# Patient Record
Sex: FEMALE | Race: BLACK OR AFRICAN AMERICAN | NOT HISPANIC OR LATINO | Employment: FULL TIME | URBAN - METROPOLITAN AREA
[De-identification: names, ages, dates, MRNs, and addresses within clinical notes are randomized per-mention and may not be internally consistent; named-entity substitution may affect disease eponyms.]

---

## 2017-02-10 ENCOUNTER — HOSPITAL ENCOUNTER (EMERGENCY)
Facility: HOSPITAL | Age: 22
Discharge: HOME | End: 2017-02-10

## 2017-02-10 VITALS
HEART RATE: 68 BPM | BODY MASS INDEX: 19.62 KG/M2 | TEMPERATURE: 98 F | SYSTOLIC BLOOD PRESSURE: 147 MMHG | WEIGHT: 125 LBS | RESPIRATION RATE: 18 BRPM | OXYGEN SATURATION: 97 % | DIASTOLIC BLOOD PRESSURE: 89 MMHG | HEIGHT: 67 IN

## 2017-02-10 DIAGNOSIS — N76.0 BACTERIAL VAGINOSIS: Primary | ICD-10-CM

## 2017-02-10 DIAGNOSIS — B96.89 BACTERIAL VAGINOSIS: Primary | ICD-10-CM

## 2017-02-10 DIAGNOSIS — A59.9 TRICHIMONIASIS: ICD-10-CM

## 2017-02-10 LAB
B-HCG UR QL: NEGATIVE
BACTERIA GENITAL QL WET PREP: ABNORMAL
BILIRUB UR QL STRIP: NEGATIVE
CLARITY UR: CLEAR
CLUE CELLS VAG QL WET PREP: ABNORMAL
COLOR UR: YELLOW
FILAMENT FUNGI VAG WET PREP-#/AREA: ABNORMAL
GLUCOSE UR QL STRIP: NEGATIVE
HGB UR QL STRIP: NEGATIVE
KETONES UR QL STRIP: NEGATIVE
LEUKOCYTE ESTERASE UR QL STRIP: ABNORMAL
MICROSCOPIC COMMENT: ABNORMAL
NITRITE UR QL STRIP: NEGATIVE
PH UR STRIP: 7 [PH] (ref 5–8)
PROT UR QL STRIP: NEGATIVE
RBC #/AREA URNS HPF: 4 /HPF (ref 0–4)
SP GR UR STRIP: 1.01 (ref 1–1.03)
SPECIMEN SOURCE: ABNORMAL
SQUAMOUS #/AREA URNS HPF: 12 /HPF
T VAGINALIS GENITAL QL WET PREP: ABNORMAL
TRICHOMONAS UR QL MICRO: ABNORMAL
URN SPEC COLLECT METH UR: ABNORMAL
UROBILINOGEN UR STRIP-ACNC: NEGATIVE EU/DL
WBC #/AREA URNS HPF: 9 /HPF (ref 0–5)
WBC #/AREA VAG WET PREP: ABNORMAL
YEAST GENITAL QL WET PREP: ABNORMAL

## 2017-02-10 PROCEDURE — 87591 N.GONORRHOEAE DNA AMP PROB: CPT

## 2017-02-10 PROCEDURE — 87210 SMEAR WET MOUNT SALINE/INK: CPT

## 2017-02-10 PROCEDURE — 99284 EMERGENCY DEPT VISIT MOD MDM: CPT

## 2017-02-10 PROCEDURE — 81025 URINE PREGNANCY TEST: CPT

## 2017-02-10 PROCEDURE — 81000 URINALYSIS NONAUTO W/SCOPE: CPT

## 2017-02-10 RX ORDER — METRONIDAZOLE 500 MG/1
500 TABLET ORAL EVERY 12 HOURS
Qty: 14 TABLET | Refills: 0 | Status: SHIPPED | OUTPATIENT
Start: 2017-02-10 | End: 2017-02-17

## 2017-02-10 NOTE — ED PROVIDER NOTES
Encounter Date: 2/10/2017       History     Chief Complaint   Patient presents with    Vaginal Discharge     with back pain, denies dysuria. Reports frequency     Review of patient's allergies indicates:  No Known Allergies  Patient is a 21 y.o. female presenting with the following complaint: female genitourinary complaint. The history is provided by the patient.   Female  Problem   Primary symptoms include discharge, pelvic pain.    Primary symptoms include no fever, no genital rash, no genital odor, no dysuria, and no vaginal bleeding.   This is a new problem. The current episode started several days ago. The problem occurs rarely. The problem has been unchanged. The symptoms occur spontaneously. She is not pregnant. The discharge was white. Associated symptoms include frequency. Pertinent negatives include no anorexia, no diaphoresis, no abdominal swelling, no abdominal pain, no constipation, no diarrhea, no nausea, no vomiting, no light-headedness and no dizziness. She has tried nothing for the symptoms. Sexual activity: sexually active. There is a concern regarding sexually transmitted diseases.     No past medical history on file.  Past Medical History Pertinent Negatives   Diagnosis Date Noted    Asthma 1/9/2015    GERD (gastroesophageal reflux disease) 1/9/2015    Hypertension 1/9/2015     No past surgical history on file.  Family History   Problem Relation Age of Onset    Birth defects Neg Hx      Social History   Substance Use Topics    Smoking status: Never Smoker    Smokeless tobacco: Not on file    Alcohol use No     Review of Systems   Constitutional: Negative for diaphoresis and fever.   HENT: Negative for sore throat.    Eyes: Negative for photophobia and redness.   Respiratory: Negative for shortness of breath.    Cardiovascular: Negative for chest pain.   Gastrointestinal: Negative for abdominal pain, anorexia, constipation, diarrhea, nausea and vomiting.   Endocrine: Negative for  polydipsia and polyphagia.   Genitourinary: Positive for frequency, pelvic pain and vaginal discharge. Negative for decreased urine volume, dysuria, urgency, vaginal bleeding and vaginal pain.   Musculoskeletal: Negative for back pain.   Skin: Negative for rash.   Neurological: Negative for dizziness, weakness and light-headedness.   Hematological: Does not bruise/bleed easily.   Psychiatric/Behavioral: The patient is not nervous/anxious.    All other systems reviewed and are negative.      Physical Exam   Initial Vitals   BP Pulse Resp Temp SpO2   02/10/17 1133 02/10/17 1133 02/10/17 1133 02/10/17 1133 02/10/17 1133   147/89 68 18 98.1 °F (36.7 °C) 97 %     Physical Exam    Nursing note and vitals reviewed.  Constitutional: Vital signs are normal. She appears well-developed and well-nourished. No distress.   HENT:   Head: Normocephalic and atraumatic.   Right Ear: External ear normal.   Left Ear: External ear normal.   Nose: Nose normal.   Mouth/Throat: Oropharynx is clear and moist.   Eyes: Conjunctivae, EOM and lids are normal. Pupils are equal, round, and reactive to light.   Neck: Normal range of motion and full passive range of motion without pain. Neck supple.   Cardiovascular: Normal rate, regular rhythm, S1 normal, S2 normal, normal heart sounds, intact distal pulses and normal pulses.   Pulmonary/Chest: Breath sounds normal. No respiratory distress. She has no wheezes. She has no rales.   Abdominal: Soft. Normal appearance and bowel sounds are normal. She exhibits no distension. There is no tenderness.   Genitourinary: Cervix exhibits discharge. Cervix exhibits no motion tenderness and no friability. Vaginal discharge (scant amount of thick, white, non-malodorous discahrge in vaginal vault) found.   Musculoskeletal: Normal range of motion.   Lymphadenopathy:     She has no cervical adenopathy.   Neurological: She is alert and oriented to person, place, and time. She has normal strength. No cranial nerve  deficit or sensory deficit. Coordination and gait normal.   Skin: Skin is warm, dry and intact.   Psychiatric: She has a normal mood and affect. Her speech is normal and behavior is normal. Judgment and thought content normal. Cognition and memory are normal.         ED Course   Procedures  Labs Reviewed   URINALYSIS - Abnormal; Notable for the following:        Result Value    Leukocytes, UA Trace (*)     All other components within normal limits   URINALYSIS MICROSCOPIC - Abnormal; Notable for the following:     WBC, UA 9 (*)     Trichomonas, UA Rare (*)     All other components within normal limits   VAGINAL SCREEN - Abnormal; Notable for the following:     Trichomonas Few (*)     Clue Cells, Wet Prep Few (*)     WBC - Vaginal Screen Few (*)     Bacteria - Vaginal Screen Many (*)     All other components within normal limits   C. TRACHOMATIS/N. GONORRHOEAE BY AMP DNA   PREGNANCY TEST, URINE RAPID                               ED Course     Clinical Impression:   The primary encounter diagnosis was Bacterial vaginosis. A diagnosis of Trichimoniasis was also pertinent to this visit.    Disposition:   Disposition: Discharged  Condition: Stable       KELSEY Olivas  02/10/17 1429       KELSEY Olivas  02/10/17 1430       KELSEY Olivas  02/10/17 1431

## 2017-02-10 NOTE — ED AVS SNAPSHOT
[facility]  [address] [facility] [address]               Miriam Jett   2/10/2017 11:57 AM   ED    Description:  Female : 1995   Department:  [facility]            Your Care was Coordinated By:     Provider Role From To    KELSEY Olivas Physician Assistant 02/10/17 0415 --      Reason for Visit     Vaginal Discharge           ED Disposition     ED Disposition Condition Comment    Discharge             To Do List            These Medications        Disp Refills Start End    metronidazole (FLAGYL) 500 MG tablet 14 tablet 0 2/10/2017 2017    Take 1 tablet (500 mg total) by mouth every 12 (twelve) hours. - Oral      [facility] On Call     [facility] Nurse Care Line -  Assistance  Registered nurses in the [facility] provide clinical advisement, health education, appointment booking, and other advisory services.  Call for this free service at [phone].             Medications           Message regarding Medications     Verify the changes and/or additions to your medication regime listed below are the same as discussed with your clinician today.  If any of these changes or additions are incorrect, please notify your healthcare provider.        START taking these NEW medications        Refills    metronidazole (FLAGYL) 500 MG tablet 0    Sig: Take 1 tablet (500 mg total) by mouth every 12 (twelve) hours.    Class: Print    Route: Oral           Verify that the below list of medications is an accurate representation of the medications you are currently taking.  If none reported, the list may be blank. If incorrect, please contact your healthcare provider. Carry this list with you in case of emergency.           Current Medications     metronidazole (FLAGYL) 500 MG tablet Take 1 tablet (500 mg total) by mouth every 12 (twelve) hours.           Clinical Reference Information           Your  "Vitals Were     BP Pulse Temp Resp Height Weight    147/89 (BP Location: Right arm, Patient Position: Sitting) 68 98.1 °F (36.7 °C) (Oral) 18 5' 7" (1.702 m) 56.7 kg (125 lb)    SpO2 BMI             97% 19.58 kg/m2         Allergies as of 2/10/2017     No Known Allergies      Immunizations Administered on Date of Encounter - 2/10/2017     None      ED Micro, Lab, POCT     Start Ordered       Status Ordering Provider    02/10/17 1231 02/10/17 1231  Vaginal Screen Vagina  STAT      Final result     02/10/17 1231 02/10/17 1231  C. trachomatis/N. gonorrhoeae by AMP DNA Vagina  STAT      In process     02/10/17 1135 02/10/17 1135  Urinalysis Clean Catch  STAT      Final result     02/10/17 1135 02/10/17 1135  Pregnancy, urine rapid (UPT)  Once      Final result     02/10/17 1135 02/10/17 1135  Urinalysis Microscopic  Once      Final result       ED Imaging Orders     None        Discharge Instructions         Trichomonas Vaginalis (Discharge)  Does this test have other names?  Trichomonas culture, testing for "trich" (pronounced "trick"), trichomoniasis, TV  What is this test?  This test looks for the Trichomonas vaginalis (T. vaginalis) parasite. This parasite causes a sexually transmitted disease (STD) called trichomoniasis. This is a common type of STD. The parasite is more likely to infect women than men.  Experts have traditionally thought it causes few complications. But during pregnancy, it can raise a woman's risk of having her baby prematurely. Infected mothers are also more likely to have a low birth weight baby. Trichomoniasis can also raise people's risk of becoming infected with or transmitting another STD, such as HIV. In men, this parasite can cause inflammation of the urethra.   Why do I need this test?  You might have this test to find out whether you have T. vaginalis. Many people who are infected have no symptoms. Only about 30% of people have symptoms.  In women, the infection can cause:  · Vaginal " discharge  · Painful urination  · Unusual vaginal odor  · Vaginal redness  · Discomfort during intercourse  · Severe vaginal itching  In men, infection may cause:  · Discharge from the penis  · Itching in the penis  · Discomfort with urination or ejaculation  What other tests might I have along with this test?  In women, the healthcare provider might check the acidity (pH) of vaginal discharge.   What do my test results mean?  Many things may affect your lab test results. These include the method each lab uses to do the test. Even if your test results are different from the normal value, you may not have a problem. To learn what the results mean for you, talk with your healthcare provider.  A normal test result means no Trichomonas parasites have been found, and the pH of the vagina will be 4.5 or less. Visible parasites under the microscope or parasites that grow in a culture dish mean you have a trichomoniasis infection. Also during trichomoniasis, the pH of vaginal discharge may be greater than 5.   How is this test done?  In women, this test requires a sample of vaginal discharge. To collect the sample, your healthcare provider may place a speculum in your vagina to look at the vagina and cervix.  In men, the healthcare provider may need to swab the inside of the urethra and collect a urine sample.  What might affect my test results?  Nothing should affect your test results.  How do I get ready for this test?  You don't need to prepare for this test.       Date Last Reviewed: 10/12/2015  © 1832-9061 [employer]. [address] [place] [address]. All rights reserved. This information is not intended as a substitute for professional medical care. Always follow your healthcare professional's instructions.          [facility] Sign-Up     Activating your [facility] account is as easy as 1-2-3!     1) Visit [email or web address], select Sign Up Now, enter this activation code and your date of birth, then  select Next.  [email or web address]  Expires: 3/27/2017  2:28 PM      2) Create a username and password to use when you visit [facility] in the future and select a security question in case you lose your password and select Next.    3) Enter your e-mail address and click Sign Up!    Additional Information  If you have questions, please e-mail [email or web address] or call [phone] to talk to our [employer] staff. Remember, [facility] is NOT to be used for urgent needs. For medical emergencies, dial 911.          [facility] complies with applicable Federal civil rights laws and does not discriminate on the basis of race, color, national origin, age, disability, or sex.        Language Assistance Services     ATTENTION: Language assistance services are available, free of charge. Please call [phone].      ATENCIÓN: Si habla español, tiene a souza disposición servicios gratuitos de asistencia lingüística. Llame al [phone].     TIANNA Ý: N?u b?n nói Ti?ng Vi?t, có các d?ch v? h? tr? ngôn ng? mi?n phí dành cho b?n. G?i s? [phone].

## 2017-02-14 LAB
C TRACH DNA SPEC QL NAA+PROBE: NEGATIVE
N GONORRHOEA DNA SPEC QL NAA+PROBE: NEGATIVE

## 2017-10-17 ENCOUNTER — HOSPITAL ENCOUNTER (EMERGENCY)
Facility: HOSPITAL | Age: 22
Discharge: HOME | End: 2017-10-17
Payer: MEDICAID

## 2017-10-17 VITALS
BODY MASS INDEX: 19.62 KG/M2 | TEMPERATURE: 99 F | SYSTOLIC BLOOD PRESSURE: 131 MMHG | DIASTOLIC BLOOD PRESSURE: 72 MMHG | HEART RATE: 71 BPM | HEIGHT: 67 IN | OXYGEN SATURATION: 100 % | WEIGHT: 125 LBS | RESPIRATION RATE: 18 BRPM

## 2017-10-17 DIAGNOSIS — A59.01 TRICHOMONAL VAGINITIS: Primary | ICD-10-CM

## 2017-10-17 DIAGNOSIS — B96.89 BACTERIAL VAGINOSIS: ICD-10-CM

## 2017-10-17 DIAGNOSIS — N76.0 BACTERIAL VAGINOSIS: ICD-10-CM

## 2017-10-17 LAB
B-HCG UR QL: NEGATIVE
BACTERIA #/AREA URNS HPF: NORMAL /HPF
BACTERIA GENITAL QL WET PREP: ABNORMAL
BILIRUB UR QL STRIP: NEGATIVE
CLARITY UR: CLEAR
CLUE CELLS VAG QL WET PREP: ABNORMAL
COLOR UR: YELLOW
FILAMENT FUNGI VAG WET PREP-#/AREA: ABNORMAL
GLUCOSE UR QL STRIP: NEGATIVE
HGB UR QL STRIP: NEGATIVE
KETONES UR QL STRIP: ABNORMAL
LEUKOCYTE ESTERASE UR QL STRIP: ABNORMAL
MICROSCOPIC COMMENT: NORMAL
NITRITE UR QL STRIP: NEGATIVE
PH UR STRIP: 6 [PH] (ref 5–8)
PROT UR QL STRIP: NEGATIVE
RBC #/AREA URNS HPF: 1 /HPF (ref 0–4)
SP GR UR STRIP: 1.01 (ref 1–1.03)
SPECIMEN SOURCE: ABNORMAL
SQUAMOUS #/AREA URNS HPF: 5 /HPF
T VAGINALIS GENITAL QL WET PREP: ABNORMAL
URN SPEC COLLECT METH UR: ABNORMAL
UROBILINOGEN UR STRIP-ACNC: NEGATIVE EU/DL
WBC #/AREA URNS HPF: 4 /HPF (ref 0–5)
WBC #/AREA VAG WET PREP: ABNORMAL
YEAST GENITAL QL WET PREP: ABNORMAL

## 2017-10-17 PROCEDURE — 25000003 PHARM REV CODE 250: Performed by: PHYSICIAN ASSISTANT

## 2017-10-17 PROCEDURE — 87210 SMEAR WET MOUNT SALINE/INK: CPT

## 2017-10-17 PROCEDURE — 99284 EMERGENCY DEPT VISIT MOD MDM: CPT | Mod: 25

## 2017-10-17 PROCEDURE — 63600175 PHARM REV CODE 636 W HCPCS: Performed by: PHYSICIAN ASSISTANT

## 2017-10-17 PROCEDURE — 81025 URINE PREGNANCY TEST: CPT

## 2017-10-17 PROCEDURE — 87591 N.GONORRHOEAE DNA AMP PROB: CPT

## 2017-10-17 PROCEDURE — 96372 THER/PROPH/DIAG INJ SC/IM: CPT

## 2017-10-17 PROCEDURE — 81000 URINALYSIS NONAUTO W/SCOPE: CPT

## 2017-10-17 RX ORDER — CEFTRIAXONE 250 MG/1
250 INJECTION, POWDER, FOR SOLUTION INTRAMUSCULAR; INTRAVENOUS
Status: COMPLETED | OUTPATIENT
Start: 2017-10-17 | End: 2017-10-17

## 2017-10-17 RX ORDER — METRONIDAZOLE 500 MG/1
500 TABLET ORAL EVERY 12 HOURS
Qty: 20 TABLET | Refills: 0 | Status: SHIPPED | OUTPATIENT
Start: 2017-10-17 | End: 2017-10-27

## 2017-10-17 RX ORDER — AZITHROMYCIN 250 MG/1
1000 TABLET, FILM COATED ORAL
Status: COMPLETED | OUTPATIENT
Start: 2017-10-17 | End: 2017-10-17

## 2017-10-17 RX ADMIN — CEFTRIAXONE SODIUM 250 MG: 250 INJECTION, POWDER, FOR SOLUTION INTRAMUSCULAR; INTRAVENOUS at 05:10

## 2017-10-17 RX ADMIN — AZITHROMYCIN 1000 MG: 250 TABLET, FILM COATED ORAL at 05:10

## 2017-10-17 NOTE — ED PROVIDER NOTES
SCRIBE #1 NOTE: I, Khushi Chavarria, am scribing for, and in the presence of, KELSEY Yoon. I have scribed the entire note.      History      Chief Complaint   Patient presents with    Vaginal Discharge     white discharge and urinary frequency       Review of patient's allergies indicates:  No Known Allergies     HPI   HPI    10/17/2017, 3:11 PM   History obtained from the patient      History of Present Illness: Miriam Jett is a 22 y.o. female patient with PMHx of trichomonas who presents to the Emergency Department for vaginal discharge which onset gradually 2 weeks ago. Symptoms are constant and moderate in severity. No mitigating or exacerbating factors reported. Associated sxs include pelvic pain and urinary frequency. Patient denies any fever, n/v/d, abd pain, vaginal pain, vaginal bleeding, dysuria, hematuria, flank pain, urgency, difficulty urinating, and all other sxs at this time. No further complaints or concerns at this time.       Arrival mode: Personal vehicle    PCP: Primary Doctor No       Past Medical History:  History reviewed. No pertinent past medical history.    Past Surgical History:  History reviewed. No pertinent surgical history.      Family History:  Family History   Problem Relation Age of Onset    Birth defects Neg Hx        Social History:  Social History     Social History Main Topics    Smoking status: Never Smoker    Smokeless tobacco: Never Used    Alcohol use No    Drug use: No    Sexual activity: Yes     Partners: Male       ROS   Review of Systems   Constitutional: Negative for fever.   HENT: Negative for sore throat.    Respiratory: Negative for shortness of breath.    Cardiovascular: Negative for chest pain.   Gastrointestinal: Negative for abdominal pain, blood in stool, constipation, diarrhea, nausea and vomiting.   Genitourinary: Positive for frequency, pelvic pain and vaginal discharge. Negative for decreased urine volume, difficulty urinating,  "dysuria, enuresis, flank pain, genital sores, hematuria, menstrual problem, urgency, vaginal bleeding and vaginal pain.   Musculoskeletal: Negative for back pain.   Skin: Negative for rash.   Neurological: Negative for dizziness, weakness, light-headedness, numbness and headaches.   Hematological: Does not bruise/bleed easily.       Physical Exam      Initial Vitals [10/17/17 1440]   BP Pulse Resp Temp SpO2   131/72 71 18 98.5 °F (36.9 °C) 100 %      MAP       91.67          Physical Exam  Nursing Notes and Vital Signs Reviewed.  Constitutional: Patient is in no acute distress. Well-developed and well-nourished.  Head: Normocephalic.  Eyes: PERRL.   ENT: Mucous membranes are moist. Oropharynx is clear and symmetric.    Cardiovascular: Regular rate. Regular rhythm.   Pulmonary/Chest: No respiratory distress. Clear to auscultation bilaterally. No wheezing, rales, or rhonchi.  Abdominal: Soft and non-distended. There is no tenderness. No rebound, guarding, or rigidity.   Genitourinary: No CVA tenderness  Pelvic: A female chaperone was present for this examination. Nl external inspection. No lesions or abnormalities were visible on the labia majora or minora. Cervical os is closed. Cervix is erythematous, not friable or dilated. There is no CMT. There is no blood in the vaginal vault. Minimal thin, light colored discharge. No adnexal tenderness. No adnexal masses.   Musculoskeletal: Moves all extremities. No septic knee.   Skin: Warm and dry. No rash to palms or soles.   Neurological:  Alert, awake, and appropriate. Normal speech. No acute focal neurological deficits are appreciated.  Psychiatric: Normal affect. Good eye contact. Appropriate in content.    ED Course    Procedures  ED Vital Signs:  Vitals:    10/17/17 1440   BP: 131/72   Pulse: 71   Resp: 18   Temp: 98.5 °F (36.9 °C)   SpO2: 100%   Weight: 56.7 kg (125 lb)   Height: 5' 7" (1.702 m)       Abnormal Lab Results:  Labs Reviewed   URINALYSIS - Abnormal; " Notable for the following:        Result Value    Ketones, UA Trace (*)     Leukocytes, UA Trace (*)     All other components within normal limits   VAGINAL SCREEN - Abnormal; Notable for the following:     Trichomonas Moderate (*)     Clue Cells, Wet Prep Moderate (*)     Bacteria - Vaginal Screen Rare (*)     All other components within normal limits   C. TRACHOMATIS/N. GONORRHOEAE BY AMP DNA   PREGNANCY TEST, URINE RAPID   URINALYSIS MICROSCOPIC     Results for orders placed or performed during the hospital encounter of 10/17/17   Pregnancy, urine rapid   Result Value Ref Range    Preg Test, Ur Negative    Urinalysis   Result Value Ref Range    Specimen UA Urine, Clean Catch     Color, UA Yellow Yellow, Straw, Nany    Appearance, UA Clear Clear    pH, UA 6.0 5.0 - 8.0    Specific Gravity, UA 1.015 1.005 - 1.030    Protein, UA Negative Negative    Glucose, UA Negative Negative    Ketones, UA Trace (A) Negative    Bilirubin (UA) Negative Negative    Occult Blood UA Negative Negative    Nitrite, UA Negative Negative    Urobilinogen, UA Negative <2.0 EU/dL    Leukocytes, UA Trace (A) Negative   Vaginal Screen   Result Value Ref Range    Trichomonas Moderate (A) None    Clue Cells, Wet Prep Moderate (A) None    Budding Yeast None None    Fungal Hyphae None None    WBC - Vaginal Screen None None    Bacteria - Vaginal Screen Rare (A) None    Wet Prep Source VAG None   Urinalysis Microscopic   Result Value Ref Range    RBC, UA 1 0 - 4 /hpf    WBC, UA 4 0 - 5 /hpf    Bacteria, UA Rare None-Occ /hpf    Squam Epithel, UA 5 /hpf    Microscopic Comment SEE COMMENT             The Emergency Provider reviewed the vital signs and test results, which are outlined above.    ED Discussion     5:50 PM: Reassessed pt at this time. Discussed with pt all pertinent ED information and results. Discussed pt dx and plan of tx. Gave pt all f/u and return to the ED instructions. All questions and concerns were addressed at this time. Pt  expresses understanding of information and instructions, and is comfortable with plan to discharge. Pt is stable for discharge.      ED Medication(s):  Medications   cefTRIAXone injection 250 mg (250 mg Intramuscular Given 10/17/17 1748)   azithromycin tablet 1,000 mg (1,000 mg Oral Given 10/17/17 1747)       New Prescriptions    METRONIDAZOLE (FLAGYL) 500 MG TABLET    Take 1 tablet (500 mg total) by mouth every 12 (twelve) hours.       Follow-up Information     [facility]. Schedule an appointment as soon as possible for a visit in 2 days.    Specialty:  Obstetrics and Gynecology  Why:  Follow up with [facility] in the next 2-3 days for re-evaluation and further management. Have partner tested and treated.  Contact information:  [address] [facility] [address] [facility] [address] [phone]  Additional information:  (off [facility]) 4th floor, Please check in for your appointment in the Women's Services Department located through the doorway to the left of the elevators.           [facility].    Specialty:  Emergency Medicine  Why:  If symptoms worsen  Contact information:  [address]  [facility] [address] [phone]                   Medical Decision Making    Medical Decision Making:   Clinical Tests:   Lab Tests: Ordered and Reviewed           Scribe Attestation:   Scribe #1: I performed the above scribed service and the documentation accurately describes the services I performed. I attest to the accuracy of the note.    Attending:   Physician Attestation Statement for Scribe #1: I, KELSEY Yoon, personally performed the services described in this documentation, as scribed by Khushi Chavarria, in my presence, and it is both accurate and complete.          Clinical Impression       ICD-10-CM ICD-9-CM   1. Trichomonal vaginitis A59.01 131.01   2. Bacterial vaginosis N76.0 616.10    B96.89 041.9       Disposition:   Disposition:  Discharged  Condition: Stable         Aditya Barry PA-C  10/17/17 6994

## 2017-10-18 LAB
C TRACH DNA SPEC QL NAA+PROBE: NOT DETECTED
N GONORRHOEA DNA SPEC QL NAA+PROBE: NOT DETECTED

## 2017-12-12 ENCOUNTER — HOSPITAL ENCOUNTER (EMERGENCY)
Facility: HOSPITAL | Age: 22
Discharge: HOME | End: 2017-12-12
Payer: MEDICAID

## 2017-12-12 VITALS
HEIGHT: 67 IN | TEMPERATURE: 99 F | BODY MASS INDEX: 19.15 KG/M2 | OXYGEN SATURATION: 99 % | DIASTOLIC BLOOD PRESSURE: 78 MMHG | SYSTOLIC BLOOD PRESSURE: 118 MMHG | HEART RATE: 76 BPM | RESPIRATION RATE: 20 BRPM | WEIGHT: 122 LBS

## 2017-12-12 DIAGNOSIS — B96.89 BACTERIAL VAGINITIS: Primary | ICD-10-CM

## 2017-12-12 DIAGNOSIS — N76.0 BACTERIAL VAGINITIS: Primary | ICD-10-CM

## 2017-12-12 LAB
B-HCG UR QL: NEGATIVE
BACTERIA GENITAL QL WET PREP: ABNORMAL
BILIRUB UR QL STRIP: NEGATIVE
CLARITY UR: CLEAR
CLUE CELLS VAG QL WET PREP: ABNORMAL
COLOR UR: YELLOW
FILAMENT FUNGI VAG WET PREP-#/AREA: ABNORMAL
GLUCOSE UR QL STRIP: NEGATIVE
HGB UR QL STRIP: NEGATIVE
KETONES UR QL STRIP: ABNORMAL
LEUKOCYTE ESTERASE UR QL STRIP: NEGATIVE
NITRITE UR QL STRIP: NEGATIVE
PH UR STRIP: 7 [PH] (ref 5–8)
PROT UR QL STRIP: NEGATIVE
SP GR UR STRIP: 1.01 (ref 1–1.03)
SPECIMEN SOURCE: ABNORMAL
T VAGINALIS GENITAL QL WET PREP: ABNORMAL
URN SPEC COLLECT METH UR: ABNORMAL
UROBILINOGEN UR STRIP-ACNC: NEGATIVE EU/DL
WBC #/AREA VAG WET PREP: ABNORMAL
YEAST GENITAL QL WET PREP: ABNORMAL

## 2017-12-12 PROCEDURE — 87591 N.GONORRHOEAE DNA AMP PROB: CPT

## 2017-12-12 PROCEDURE — 99284 EMERGENCY DEPT VISIT MOD MDM: CPT

## 2017-12-12 PROCEDURE — 87210 SMEAR WET MOUNT SALINE/INK: CPT

## 2017-12-12 PROCEDURE — 81025 URINE PREGNANCY TEST: CPT

## 2017-12-12 PROCEDURE — 81003 URINALYSIS AUTO W/O SCOPE: CPT

## 2017-12-12 RX ORDER — METRONIDAZOLE 500 MG/1
500 TABLET ORAL EVERY 12 HOURS
Qty: 14 TABLET | Refills: 0 | Status: SHIPPED | OUTPATIENT
Start: 2017-12-12 | End: 2017-12-19

## 2017-12-12 NOTE — ED PROVIDER NOTES
Encounter Date: 12/12/2017       History     Chief Complaint   Patient presents with    Vaginal Discharge     patient c/o vaginal discharge and odor for the last week     The history is provided by the patient.   Vaginal Discharge   This is a recurrent problem. The current episode started more than 1 week ago. The problem occurs daily. The problem has not changed since onset.Pertinent negatives include no chest pain, no abdominal pain, no headaches and no shortness of breath. Nothing aggravates the symptoms. Nothing relieves the symptoms. She has tried nothing for the symptoms.     Review of patient's allergies indicates:  No Known Allergies  No past medical history on file.  No past surgical history on file.  Family History   Problem Relation Age of Onset    Birth defects Neg Hx      Social History   Substance Use Topics    Smoking status: Never Smoker    Smokeless tobacco: Never Used    Alcohol use No     Review of Systems   Constitutional: Negative for chills and fever.   HENT: Negative for sore throat.    Eyes: Negative for photophobia and redness.   Respiratory: Negative for cough and shortness of breath.    Cardiovascular: Negative for chest pain.   Gastrointestinal: Negative for abdominal pain, diarrhea and nausea.   Endocrine: Negative for polydipsia and polyphagia.   Genitourinary: Positive for vaginal discharge. Negative for decreased urine volume, difficulty urinating, dyspareunia, dysuria, enuresis, flank pain, frequency, genital sores, hematuria, menstrual problem, pelvic pain, urgency, vaginal bleeding and vaginal pain.   Musculoskeletal: Negative for arthralgias, back pain and myalgias.   Skin: Negative for rash.   Neurological: Negative for weakness and headaches.   Hematological: Does not bruise/bleed easily.   Psychiatric/Behavioral: The patient is not nervous/anxious.    All other systems reviewed and are negative.      Physical Exam     Initial Vitals [12/12/17 1119]   BP Pulse Resp Temp  SpO2   135/88 72 18 98.5 °F (36.9 °C) 98 %      MAP       103.67         Physical Exam    Nursing note and vitals reviewed.  Constitutional: Vital signs are normal. She appears well-developed and well-nourished. No distress.   HENT:   Head: Normocephalic and atraumatic.   Right Ear: External ear normal.   Left Ear: External ear normal.   Nose: Nose normal.   Mouth/Throat: Oropharynx is clear and moist.   Eyes: Conjunctivae, EOM and lids are normal. Pupils are equal, round, and reactive to light.   Neck: Normal range of motion and full passive range of motion without pain. Neck supple.   Cardiovascular: Normal rate, regular rhythm, S1 normal, S2 normal, normal heart sounds, intact distal pulses and normal pulses.   Pulmonary/Chest: Breath sounds normal. No respiratory distress. She has no wheezes. She has no rales.   Abdominal: Soft. Normal appearance and bowel sounds are normal. She exhibits no distension. There is no tenderness.   Genitourinary: Uterus normal. Cervix exhibits no motion tenderness, no discharge and no friability. Right adnexum displays no mass, no tenderness and no fullness. Left adnexum displays no mass, no tenderness and no fullness. No tenderness or bleeding in the vagina. Vaginal discharge (scant amount of malodorous, thick, white discharge in vag vault) found.   Musculoskeletal: Normal range of motion.   Lymphadenopathy:     She has no cervical adenopathy.   Neurological: She is alert and oriented to person, place, and time. She has normal strength. No cranial nerve deficit or sensory deficit. Coordination and gait normal.   Skin: Skin is warm, dry and intact.   Psychiatric: She has a normal mood and affect. Her speech is normal and behavior is normal. Judgment and thought content normal. Cognition and memory are normal.         ED Course   Procedures  Labs Reviewed   URINALYSIS - Abnormal; Notable for the following:        Result Value    Ketones, UA 1+ (*)     All other components within  normal limits   VAGINAL SCREEN - Abnormal; Notable for the following:     Clue Cells, Wet Prep Many (*)     Bacteria - Vaginal Screen Moderate (*)     All other components within normal limits   C. TRACHOMATIS/N. GONORRHOEAE BY AMP DNA   PREGNANCY TEST, URINE RAPID                               ED Course      Clinical Impression:   The encounter diagnosis was Bacterial vaginitis.    Disposition:   Disposition: Discharged  Condition: Stable                        KELSEY Olivas  12/12/17 1204

## 2017-12-13 LAB
C TRACH DNA SPEC QL NAA+PROBE: NOT DETECTED
N GONORRHOEA DNA SPEC QL NAA+PROBE: NOT DETECTED

## 2017-12-20 ENCOUNTER — TELEPHONE (OUTPATIENT)
Dept: PHARMACY | Facility: CLINIC | Age: 22
End: 2017-12-20

## 2017-12-20 NOTE — TELEPHONE ENCOUNTER
HIPAA AUTHORIZATION FORM-Spoke with patient about referral for medication.  Patient stated she was able to  medication from the pharmacy.

## 2018-06-12 NOTE — DISCHARGE INSTRUCTIONS
"  Trichomonas Vaginalis (Discharge)  Does this test have other names?  Trichomonas culture, testing for "trich" (pronounced "trick"), trichomoniasis, TV  What is this test?  This test looks for the Trichomonas vaginalis (T. vaginalis) parasite. This parasite causes a sexually transmitted disease (STD) called trichomoniasis. This is a common type of STD. The parasite is more likely to infect women than men.  Experts have traditionally thought it causes few complications. But during pregnancy, it can raise a woman's risk of having her baby prematurely. Infected mothers are also more likely to have a low birth weight baby. Trichomoniasis can also raise people's risk of becoming infected with or transmitting another STD, such as HIV. In men, this parasite can cause inflammation of the urethra.   Why do I need this test?  You might have this test to find out whether you have T. vaginalis. Many people who are infected have no symptoms. Only about 30% of people have symptoms.  In women, the infection can cause:  · Vaginal discharge  · Painful urination  · Unusual vaginal odor  · Vaginal redness  · Discomfort during intercourse  · Severe vaginal itching  In men, infection may cause:  · Discharge from the penis  · Itching in the penis  · Discomfort with urination or ejaculation  What other tests might I have along with this test?  In women, the healthcare provider might check the acidity (pH) of vaginal discharge.   What do my test results mean?  Many things may affect your lab test results. These include the method each lab uses to do the test. Even if your test results are different from the normal value, you may not have a problem. To learn what the results mean for you, talk with your healthcare provider.  A normal test result means no Trichomonas parasites have been found, and the pH of the vagina will be 4.5 or less. Visible parasites under the microscope or parasites that grow in a culture dish mean you have " a trichomoniasis infection. Also during trichomoniasis, the pH of vaginal discharge may be greater than 5.   How is this test done?  In women, this test requires a sample of vaginal discharge. To collect the sample, your healthcare provider may place a speculum in your vagina to look at the vagina and cervix.  In men, the healthcare provider may need to swab the inside of the urethra and collect a urine sample.  What might affect my test results?  Nothing should affect your test results.  How do I get ready for this test?  You don't need to prepare for this test.       Date Last Reviewed: 10/12/2015  © 5913-8872 [employer]. [address] [place] [address], [place] [address]. All rights reserved. This information is not intended as a substitute for professional medical care. Always follow your healthcare professional's instructions.         Additional Anesthesia Volume In Cc (Will Not Render If 0): 2 Billing Type: Third-Party Bill Anesthesia Type: 1% lidocaine with epinephrine Detail Level: Detailed X Size Of Lesion In Cm: 0 Post-Care Instructions: I reviewed with the patient in detail post-care instructions. Patient is to keep the biopsy site dry overnight, and then apply antibiotic ointment twice daily until healed. Patient may wash gently with soap and water twice daily. Notification Instructions: Patient will be notified of biopsy results (either by call or post card). However, patient instructed to call the office if not contacted within 2 weeks. Type Of Destruction Used: Electrodesiccation Cryotherapy Text: The wound bed was treated with cryotherapy after the biopsy was performed. Hemostasis: Aluminum Chloride Anesthesia Volume In Cc: 0.5 Was A Bandage Applied: Yes Electrodesiccation Text: The wound bed was treated with electrodesiccation after the biopsy was performed. Consent: Written consent was obtained and risks were reviewed including but not limited to scarring, infection, bleeding, scabbing, incomplete removal, nerve damage and allergy to anesthesia. Electrodesiccation And Curettage Text: The wound bed was treated with electrodesiccation and curettage after the biopsy was performed. Biopsy Method: 15 blade Wound Care: Bacitracin Bill [id number] For Specimen Handling/Conveyance To Laboratory?: no Lab: [id number] Dressing: bandage Silver Nitrate Text: The wound bed was treated with silver nitrate after the biopsy was performed. Biopsy Type: H and E Lab Facility: [id number]

## 2018-07-05 ENCOUNTER — HOSPITAL ENCOUNTER (EMERGENCY)
Facility: HOSPITAL | Age: 23
Discharge: HOME | End: 2018-07-05
Payer: MEDICAID

## 2018-07-05 VITALS
TEMPERATURE: 98 F | BODY MASS INDEX: 19.98 KG/M2 | DIASTOLIC BLOOD PRESSURE: 61 MMHG | HEART RATE: 78 BPM | SYSTOLIC BLOOD PRESSURE: 139 MMHG | RESPIRATION RATE: 20 BRPM | HEIGHT: 67 IN | WEIGHT: 127.31 LBS | OXYGEN SATURATION: 98 %

## 2018-07-05 DIAGNOSIS — N92.6 ABNORMAL MENSES: ICD-10-CM

## 2018-07-05 DIAGNOSIS — Z32.02 ENCOUNTER FOR PREGNANCY TEST, RESULT NEGATIVE: Primary | ICD-10-CM

## 2018-07-05 DIAGNOSIS — R11.2 NON-INTRACTABLE VOMITING WITH NAUSEA, UNSPECIFIED VOMITING TYPE: ICD-10-CM

## 2018-07-05 LAB — B-HCG UR QL: NEGATIVE

## 2018-07-05 PROCEDURE — 25000003 PHARM REV CODE 250: Performed by: PHYSICIAN ASSISTANT

## 2018-07-05 PROCEDURE — 81025 URINE PREGNANCY TEST: CPT

## 2018-07-05 PROCEDURE — 99283 EMERGENCY DEPT VISIT LOW MDM: CPT

## 2018-07-05 RX ORDER — ONDANSETRON 4 MG/1
4 TABLET, ORALLY DISINTEGRATING ORAL EVERY 6 HOURS PRN
Qty: 10 TABLET | Refills: 0 | Status: SHIPPED | OUTPATIENT
Start: 2018-07-05 | End: 2020-12-03

## 2018-07-05 RX ORDER — ONDANSETRON 4 MG/1
4 TABLET, ORALLY DISINTEGRATING ORAL
Status: COMPLETED | OUTPATIENT
Start: 2018-07-05 | End: 2018-07-05

## 2018-07-05 RX ADMIN — ONDANSETRON 4 MG: 4 TABLET, ORALLY DISINTEGRATING ORAL at 01:07

## 2018-07-05 NOTE — ED NOTES
Bed: R 01  Expected date:   Expected time:   Means of arrival:   Comments:     Angel Vital RN  07/05/18 4189

## 2018-07-05 NOTE — ED PROVIDER NOTES
"Encounter Date: 7/5/2018       History     Chief Complaint   Patient presents with    Vomiting     Pt states, "I have been vomiting since yesterday."     The patient presents to the ER requesting a pregnancy test. She states that her period is 2-3 days late. She states that she is sexually active and not using any form or method of birth control. She states that she has had intermittent nausea and vomiting since yesterday. She denies any additional symptoms. She has not had a pregnancy test yet.           Review of patient's allergies indicates:  No Known Allergies  Past Medical History:   Diagnosis Date    Miscarriage      History reviewed. No pertinent surgical history.  Family History   Problem Relation Age of Onset    Birth defects Neg Hx      Social History   Substance Use Topics    Smoking status: Never Smoker    Smokeless tobacco: Never Used    Alcohol use No     Review of Systems   Constitutional: Negative for chills and fever.   HENT: Negative for congestion and sore throat.    Respiratory: Negative for cough and shortness of breath.    Cardiovascular: Negative for chest pain.   Gastrointestinal: Positive for nausea and vomiting. Negative for abdominal distention, abdominal pain, constipation and diarrhea.   Endocrine: Negative for polydipsia and polyuria.   Genitourinary: Positive for menstrual problem. Negative for decreased urine volume, difficulty urinating, dyspareunia, dysuria, flank pain, frequency, pelvic pain, vaginal bleeding, vaginal discharge and vaginal pain.   Musculoskeletal: Negative for arthralgias, back pain and gait problem.   Skin: Negative for rash.   Neurological: Negative for dizziness, syncope, weakness, light-headedness and headaches.   Psychiatric/Behavioral: Negative for confusion.       Physical Exam     Initial Vitals [07/05/18 1241]   BP Pulse Resp Temp SpO2   136/86 84 20 98.3 °F (36.8 °C) 100 %      MAP       --         Physical Exam    Nursing note and vitals " reviewed.  Constitutional: She appears well-developed and well-nourished. She is not diaphoretic.   HENT:   Head: Normocephalic.   Mouth/Throat: Oropharynx is clear and moist.   Eyes: Conjunctivae are normal. No scleral icterus.   Cardiovascular: Normal rate and intact distal pulses.   Pulmonary/Chest: Breath sounds normal. No respiratory distress.   Abdominal: Soft. She exhibits no distension. There is no tenderness. There is no rebound and no guarding.   Musculoskeletal: Normal range of motion.   Neurological: She is alert and oriented to person, place, and time. She has normal strength. No cranial nerve deficit or sensory deficit.   Skin: Skin is warm and dry.   Psychiatric: She has a normal mood and affect. Her behavior is normal.         ED Course   Procedures  Labs Reviewed   PREGNANCY TEST, URINE RAPID     Results for orders placed or performed during the hospital encounter of 07/05/18   Pregnancy, urine rapid   Result Value Ref Range    Preg Test, Ur Negative             Imaging Results    None          Medical Decision Making:   Clinical Tests:   Lab Tests: Ordered and Reviewed                      Clinical Impression:   The primary encounter diagnosis was Encounter for pregnancy test, result negative. Diagnoses of Non-intractable vomiting with nausea, unspecified vomiting type and Abnormal menses were also pertinent to this visit.      Disposition:   Disposition: Discharged  Condition: Stable                        Aditya Barry PA-C  07/05/18 1341

## 2019-04-09 ENCOUNTER — HOSPITAL ENCOUNTER (EMERGENCY)
Facility: HOSPITAL | Age: 24
Discharge: HOME | End: 2019-04-09
Attending: EMERGENCY MEDICINE
Payer: MEDICAID

## 2019-04-09 VITALS
RESPIRATION RATE: 20 BRPM | HEART RATE: 68 BPM | SYSTOLIC BLOOD PRESSURE: 117 MMHG | TEMPERATURE: 98 F | HEIGHT: 67 IN | WEIGHT: 129.06 LBS | DIASTOLIC BLOOD PRESSURE: 74 MMHG | OXYGEN SATURATION: 99 % | BODY MASS INDEX: 20.26 KG/M2

## 2019-04-09 DIAGNOSIS — R11.0 NAUSEA: Primary | ICD-10-CM

## 2019-04-09 DIAGNOSIS — N92.1 METRORRHAGIA: ICD-10-CM

## 2019-04-09 LAB
B-HCG UR QL: NEGATIVE
BILIRUB UR QL STRIP: NEGATIVE
CLARITY UR: CLEAR
COLOR UR: YELLOW
GLUCOSE UR QL STRIP: NEGATIVE
HCG INTACT+B SERPL-ACNC: <1.2 MIU/ML
HGB UR QL STRIP: NEGATIVE
KETONES UR QL STRIP: NEGATIVE
LEUKOCYTE ESTERASE UR QL STRIP: NEGATIVE
NITRITE UR QL STRIP: NEGATIVE
PH UR STRIP: 6 [PH] (ref 5–8)
PROT UR QL STRIP: NEGATIVE
SP GR UR STRIP: 1.02 (ref 1–1.03)
URN SPEC COLLECT METH UR: NORMAL
UROBILINOGEN UR STRIP-ACNC: NEGATIVE EU/DL

## 2019-04-09 PROCEDURE — 81025 URINE PREGNANCY TEST: CPT

## 2019-04-09 PROCEDURE — 86703 HIV-1/HIV-2 1 RESULT ANTBDY: CPT

## 2019-04-09 PROCEDURE — 81003 URINALYSIS AUTO W/O SCOPE: CPT

## 2019-04-09 PROCEDURE — 99283 EMERGENCY DEPT VISIT LOW MDM: CPT

## 2019-04-09 PROCEDURE — 84702 CHORIONIC GONADOTROPIN TEST: CPT

## 2019-04-09 RX ORDER — PROMETHAZINE HYDROCHLORIDE 25 MG/1
25 TABLET ORAL EVERY 6 HOURS PRN
Qty: 15 TABLET | Refills: 0 | Status: SHIPPED | OUTPATIENT
Start: 2019-04-09 | End: 2020-12-03

## 2019-04-10 LAB — HIV 1+2 AB+HIV1 P24 AG SERPL QL IA: NEGATIVE

## 2020-02-20 ENCOUNTER — HOSPITAL ENCOUNTER (EMERGENCY)
Facility: HOSPITAL | Age: 25
Discharge: HOME | End: 2020-02-20
Attending: EMERGENCY MEDICINE
Payer: MEDICAID

## 2020-02-20 VITALS
OXYGEN SATURATION: 100 % | RESPIRATION RATE: 16 BRPM | WEIGHT: 152.75 LBS | DIASTOLIC BLOOD PRESSURE: 71 MMHG | HEIGHT: 67 IN | HEART RATE: 80 BPM | SYSTOLIC BLOOD PRESSURE: 130 MMHG | TEMPERATURE: 98 F | BODY MASS INDEX: 23.98 KG/M2

## 2020-02-20 DIAGNOSIS — R25.2 MUSCLE CRAMPING: Primary | ICD-10-CM

## 2020-02-20 LAB
ALBUMIN SERPL BCP-MCNC: 4.4 G/DL (ref 3.5–5.2)
ALP SERPL-CCNC: 38 U/L (ref 55–135)
ALT SERPL W/O P-5'-P-CCNC: 15 U/L (ref 10–44)
ANION GAP SERPL CALC-SCNC: 9 MMOL/L (ref 8–16)
AST SERPL-CCNC: 15 U/L (ref 10–40)
BASOPHILS # BLD AUTO: 0.05 K/UL (ref 0–0.2)
BASOPHILS NFR BLD: 0.5 % (ref 0–1.9)
BILIRUB SERPL-MCNC: 0.2 MG/DL (ref 0.1–1)
BUN SERPL-MCNC: 14 MG/DL (ref 6–20)
CALCIUM SERPL-MCNC: 10 MG/DL (ref 8.7–10.5)
CHLORIDE SERPL-SCNC: 103 MMOL/L (ref 95–110)
CO2 SERPL-SCNC: 27 MMOL/L (ref 23–29)
CREAT SERPL-MCNC: 0.9 MG/DL (ref 0.5–1.4)
DIFFERENTIAL METHOD: ABNORMAL
EOSINOPHIL # BLD AUTO: 0.1 K/UL (ref 0–0.5)
EOSINOPHIL NFR BLD: 0.5 % (ref 0–8)
ERYTHROCYTE [DISTWIDTH] IN BLOOD BY AUTOMATED COUNT: 13.6 % (ref 11.5–14.5)
EST. GFR  (AFRICAN AMERICAN): >60 ML/MIN/1.73 M^2
EST. GFR  (NON AFRICAN AMERICAN): >60 ML/MIN/1.73 M^2
GLUCOSE SERPL-MCNC: 91 MG/DL (ref 70–110)
HCT VFR BLD AUTO: 35.4 % (ref 37–48.5)
HGB BLD-MCNC: 11.9 G/DL (ref 12–16)
IMM GRANULOCYTES # BLD AUTO: 0.03 K/UL (ref 0–0.04)
IMM GRANULOCYTES NFR BLD AUTO: 0.3 % (ref 0–0.5)
LYMPHOCYTES # BLD AUTO: 2.7 K/UL (ref 1–4.8)
LYMPHOCYTES NFR BLD: 28.5 % (ref 18–48)
MCH RBC QN AUTO: 31.6 PG (ref 27–31)
MCHC RBC AUTO-ENTMCNC: 33.6 G/DL (ref 32–36)
MCV RBC AUTO: 94 FL (ref 82–98)
MONOCYTES # BLD AUTO: 0.9 K/UL (ref 0.3–1)
MONOCYTES NFR BLD: 9.4 % (ref 4–15)
NEUTROPHILS # BLD AUTO: 5.7 K/UL (ref 1.8–7.7)
NEUTROPHILS NFR BLD: 60.8 % (ref 38–73)
NRBC BLD-RTO: 0 /100 WBC
PLATELET # BLD AUTO: 254 K/UL (ref 150–350)
PMV BLD AUTO: 10.4 FL (ref 9.2–12.9)
POTASSIUM SERPL-SCNC: 4.1 MMOL/L (ref 3.5–5.1)
PROT SERPL-MCNC: 7.7 G/DL (ref 6–8.4)
RBC # BLD AUTO: 3.77 M/UL (ref 4–5.4)
SODIUM SERPL-SCNC: 139 MMOL/L (ref 136–145)
WBC # BLD AUTO: 9.46 K/UL (ref 3.9–12.7)

## 2020-02-20 PROCEDURE — 96361 HYDRATE IV INFUSION ADD-ON: CPT

## 2020-02-20 PROCEDURE — 80053 COMPREHEN METABOLIC PANEL: CPT

## 2020-02-20 PROCEDURE — 96374 THER/PROPH/DIAG INJ IV PUSH: CPT

## 2020-02-20 PROCEDURE — 63600175 PHARM REV CODE 636 W HCPCS: Performed by: NURSE PRACTITIONER

## 2020-02-20 PROCEDURE — 85025 COMPLETE CBC W/AUTO DIFF WBC: CPT

## 2020-02-20 PROCEDURE — 99284 EMERGENCY DEPT VISIT MOD MDM: CPT | Mod: 25

## 2020-02-20 RX ORDER — CYCLOBENZAPRINE HCL 10 MG
10 TABLET ORAL 3 TIMES DAILY PRN
Qty: 15 TABLET | Refills: 0 | Status: SHIPPED | OUTPATIENT
Start: 2020-02-20 | End: 2020-02-25

## 2020-02-20 RX ORDER — ORPHENADRINE CITRATE 30 MG/ML
30 INJECTION INTRAMUSCULAR; INTRAVENOUS
Status: COMPLETED | OUTPATIENT
Start: 2020-02-20 | End: 2020-02-20

## 2020-02-20 RX ORDER — SODIUM CHLORIDE 9 MG/ML
1000 INJECTION, SOLUTION INTRAVENOUS
Status: COMPLETED | OUTPATIENT
Start: 2020-02-20 | End: 2020-02-20

## 2020-02-20 RX ADMIN — ORPHENADRINE CITRATE 30 MG: 30 INJECTION INTRAMUSCULAR; INTRAVENOUS at 09:02

## 2020-02-20 RX ADMIN — SODIUM CHLORIDE 1000 ML: 0.9 INJECTION, SOLUTION INTRAVENOUS at 09:02

## 2020-02-20 NOTE — ED NOTES
Patient identifiers verified and correct for Miriam Jett.    Patient presented to the ED with c/o lower leg and back cramping, patient reports cramps get worse with any movement. Patient denies any injury or any other complaints at this time.      LOC: The patient is awake, alert and aware of environment with an appropriate affect, the patient is oriented x 3 and speaking appropriately.  APPEARANCE: Patient resting comfortably and in no acute distress, patient is clean and well groomed, patient's clothing is properly fastened.  HEENT: WNL   SKIN: The skin is warm and dry, color consistent with ethnicity, patient has normal skin turgor and moist mucus membranes, skin intact, no breakdown or bruising noted.  MUSCULOSKELETAL: + muscle cramping in lower legs and back    RESPIRATORY: Airway is open and patent, respirations are spontaneous, and unlabored. Breath sounds are clear.   CARDIAC: Patient has a normal rate, no periphreal edema noted, capillary refill < 3 seconds.   ABDOMEN: Soft and non tender to palpation. No distention noted.   : Normal urinary patterns. Urine is yellow without foul odor.

## 2020-02-20 NOTE — ED PROVIDER NOTES
Encounter Date: 2/20/2020       History     Chief Complaint   Patient presents with    Cramping     bilateral leg cramping that began this am.      24 year old female with complaint of lower leg cramping since 4 am.  No injury. No fall.  No vomiting. No diarrhea.  Tolerating fluids without difficulty.  Pt taking Megace for lack of appetite.         Review of patient's allergies indicates:  No Known Allergies  Past Medical History:   Diagnosis Date    Miscarriage      History reviewed. No pertinent surgical history.  Family History   Problem Relation Age of Onset    Birth defects Neg Hx      Social History     Tobacco Use    Smoking status: Never Smoker    Smokeless tobacco: Never Used   Substance Use Topics    Alcohol use: No    Drug use: No     Review of Systems   Constitutional: Negative for fever.   HENT: Negative for sore throat.    Respiratory: Negative for shortness of breath.    Cardiovascular: Negative for chest pain.   Gastrointestinal: Negative for nausea.   Genitourinary: Negative for dysuria.   Musculoskeletal: Negative for back pain.        Lower leg cramping   Skin: Negative for rash.   Neurological: Negative for weakness.   Hematological: Does not bruise/bleed easily.       Physical Exam     Initial Vitals [02/20/20 0901]   BP Pulse Resp Temp SpO2   139/81 86 16 97.7 °F (36.5 °C) 100 %      MAP       --         Physical Exam    Nursing note and vitals reviewed.  Constitutional: She appears well-developed and well-nourished.   HENT:   Head: Normocephalic and atraumatic.   Eyes: Conjunctivae and EOM are normal. Pupils are equal, round, and reactive to light.   Neck: Normal range of motion. Neck supple.   Cardiovascular: Normal rate, regular rhythm, normal heart sounds and intact distal pulses.   Pulmonary/Chest: Breath sounds normal.   Abdominal: Soft. There is no tenderness. There is no rebound and no guarding.   Musculoskeletal: Normal range of motion.   Neurological: She is alert and oriented  to person, place, and time. She has normal strength and normal reflexes.   Skin: Skin is warm and dry.   Psychiatric: She has a normal mood and affect. Her behavior is normal. Thought content normal.         ED Course   Procedures  Labs Reviewed   CBC W/ AUTO DIFFERENTIAL - Abnormal; Notable for the following components:       Result Value    RBC 3.77 (*)     Hemoglobin 11.9 (*)     Hematocrit 35.4 (*)     Mean Corpuscular Hemoglobin 31.6 (*)     All other components within normal limits   COMPREHENSIVE METABOLIC PANEL - Abnormal; Notable for the following components:    Alkaline Phosphatase 38 (*)     All other components within normal limits          Imaging Results    None                                          Clinical Impression:       ICD-10-CM ICD-9-CM   1. Muscle cramping R25.2 729.82                             Srini Parra NP  02/20/20 1026

## 2020-02-21 ENCOUNTER — HOSPITAL ENCOUNTER (EMERGENCY)
Facility: HOSPITAL | Age: 25
Discharge: HOME | End: 2020-02-21
Attending: EMERGENCY MEDICINE
Payer: MEDICAID

## 2020-02-21 VITALS
DIASTOLIC BLOOD PRESSURE: 78 MMHG | TEMPERATURE: 99 F | BODY MASS INDEX: 23.79 KG/M2 | HEART RATE: 85 BPM | OXYGEN SATURATION: 98 % | WEIGHT: 151.88 LBS | RESPIRATION RATE: 18 BRPM | SYSTOLIC BLOOD PRESSURE: 141 MMHG

## 2020-02-21 DIAGNOSIS — M79.604 RIGHT LEG PAIN: Primary | ICD-10-CM

## 2020-02-21 DIAGNOSIS — M79.605 LEFT LEG PAIN: ICD-10-CM

## 2020-02-21 PROCEDURE — 99284 EMERGENCY DEPT VISIT MOD MDM: CPT

## 2020-02-21 RX ORDER — METHYLPREDNISOLONE 4 MG/1
TABLET ORAL
Qty: 1 PACKAGE | Refills: 0 | Status: SHIPPED | OUTPATIENT
Start: 2020-02-21 | End: 2020-03-13

## 2020-02-21 RX ORDER — HYDROCODONE BITARTRATE AND ACETAMINOPHEN 5; 325 MG/1; MG/1
1 TABLET ORAL EVERY 4 HOURS PRN
Qty: 12 TABLET | Refills: 0 | Status: SHIPPED | OUTPATIENT
Start: 2020-02-21 | End: 2020-12-03

## 2020-02-21 NOTE — ED PROVIDER NOTES
SCRIBE #1 NOTE: I, Leo Pierre, am scribing for, and in the presence of, KELSEY Olivas. I have scribed the entire note.       History     Chief Complaint   Patient presents with    Leg Pain     bilateral leg cramping; was seen here yesterday for same comaplaint; states prescribed muscle relaxers     Review of patient's allergies indicates:  No Known Allergies      History of Present Illness     HPI    2/21/2020, 2:56 PM  History obtained from the patient      History of Present Illness: Miriam Jett is a 24 y.o. female patient with a PMHx of miscarriage who presents to the Emergency Department for evaluation of bilateral leg pain which onset gradually one day ago. Pt was last seen here yesterday for the same complaint. Symptoms are constant and moderate in severity. No mitigating or exacerbating factors reported. No associated sxs reported. Patient denies any recent trauma, HA, abd pain, n/v/d, weakness, numbness, sore throat, cough, congestion, and all other sxs at this time. Prior Tx includes Flexeril with no relief. No further complaints or concerns at this time.       Arrival mode: Personal vehicle     PCP: Primary Doctor No        Past Medical History:  Past Medical History:   Diagnosis Date    Miscarriage        Past Surgical History:  History reviewed. No pertinent past surgical history.      Family History:  Family History   Problem Relation Age of Onset    Birth defects Neg Hx        Social History:  Social History     Tobacco Use    Smoking status: Never Smoker    Smokeless tobacco: Never Used   Substance and Sexual Activity    Alcohol use: No    Drug use: No    Sexual activity: Yes     Partners: Male     Birth control/protection: None        Review of Systems     Review of Systems   Constitutional: Negative for chills and fever.        (-) recent trauma   HENT: Negative for congestion and sore throat.    Respiratory: Negative for cough and shortness of breath.     Cardiovascular: Negative for chest pain.   Gastrointestinal: Negative for abdominal pain, diarrhea, nausea and vomiting.   Genitourinary: Negative for dysuria.   Musculoskeletal: Negative for back pain.        (+) bilateral leg pain   Skin: Negative for rash.   Neurological: Negative for weakness, numbness and headaches.   Hematological: Does not bruise/bleed easily.   All other systems reviewed and are negative.     Physical Exam     Initial Vitals [02/21/20 1448]   BP Pulse Resp Temp SpO2   (!) 141/78 85 18 98.9 °F (37.2 °C) 98 %      MAP       --          Physical Exam  Nursing Notes and Vital Signs Reviewed.  Constitutional: Patient is in no apparent distress. Well-developed and well-nourished.  Head: Atraumatic. Normocephalic.  Eyes: PERRL. EOM intact. Conjunctivae are not pale. No scleral icterus.  ENT: Mucous membranes are moist. Oropharynx is clear and symmetric.    Neck: Supple. Full ROM. No lymphadenopathy.  Cardiovascular: Regular rate. Regular rhythm. No murmurs, rubs, or gallops. Distal pulses are 2+ and symmetric.  Pulmonary/Chest: No respiratory distress. Clear to auscultation bilaterally. No wheezing or rales.  Abdominal: Soft and non-distended.  There is no tenderness.  No rebound, guarding, or rigidity. Good bowel sounds.  Musculoskeletal: Moves all extremities. No obvious deformities. No edema. No calf tenderness. No calf swelling. Tenderness to soles of both feet.   Skin: Warm and dry.  Neurological:  Alert, awake, and appropriate.  Normal speech.  No acute focal neurological deficits are appreciated.  Psychiatric: Normal affect. Good eye contact. Appropriate in content.     ED Course   Procedures  ED Vital Signs:  Vitals:    02/21/20 1448   BP: (!) 141/78   Pulse: 85   Resp: 18   Temp: 98.9 °F (37.2 °C)   TempSrc: Oral   SpO2: 98%   Weight: 68.9 kg (151 lb 14.4 oz)       Abnormal Lab Results:  Labs Reviewed - No data to display     All Lab Results:  None.    Imaging Results:  Imaging Results     None                 The Emergency Provider reviewed the vital signs and test results, which are outlined above.     ED Discussion   3:18 PM: Reassessed pt at this time.  Pt states her condition has improved at this time. Discussed with pt all pertinent ED information. Discussed pt dx and plan of tx. Gave pt all f/u and return to the ED instructions. All questions and concerns were addressed at this time. Pt expresses understanding of information and instructions, and is comfortable with plan to discharge. Pt is stable for discharge.    I discussed with patient and/or family/caretaker that evaluation in the ED does not suggest any emergent or life threatening medical conditions requiring immediate intervention beyond what was provided in the ED, and I believe patient is safe for discharge.  Regardless, an unremarkable evaluation in the ED does not preclude the development or presence of a serious of life threatening condition. As such, patient was instructed to return immediately for any worsening or change in current symptoms.                   ED Medication(s):  Medications - No data to display    Discharge Medication List as of 2/21/2020  2:53 PM      START taking these medications    Details   HYDROcodone-acetaminophen (NORCO) 5-325 mg per tablet Take 1 tablet by mouth every 4 (four) hours as needed., Starting Fri 2/21/2020, Print      methylPREDNISolone (MEDROL DOSEPACK) 4 mg tablet As directed, Print             Follow-up Information     PCP. Go in 2 days.                     Scribe Attestation:   Scribe #1: I performed the above scribed service and the documentation accurately describes the services I performed. I attest to the accuracy of the note.     Attending:   Physician Attestation Statement for Scribe #1: I, KELSEY Olivas, personally performed the services described in this documentation, as scribed by Leo Pierre, in my presence, and it is both accurate and complete.           Clinical  Impression       ICD-10-CM ICD-9-CM   1. Right leg pain M79.604 729.5   2. Left leg pain M79.605 729.5       Disposition:   Disposition: Discharged  Condition: Stable         KELSEY Olivas  02/25/20 0806

## 2020-07-16 ENCOUNTER — HOSPITAL ENCOUNTER (EMERGENCY)
Facility: HOSPITAL | Age: 25
Discharge: HOME | End: 2020-07-16
Attending: EMERGENCY MEDICINE
Payer: MEDICAID

## 2020-07-16 VITALS
BODY MASS INDEX: 27.28 KG/M2 | TEMPERATURE: 99 F | RESPIRATION RATE: 18 BRPM | OXYGEN SATURATION: 98 % | SYSTOLIC BLOOD PRESSURE: 122 MMHG | WEIGHT: 174.19 LBS | HEART RATE: 110 BPM | DIASTOLIC BLOOD PRESSURE: 76 MMHG

## 2020-07-16 DIAGNOSIS — M79.606 LEG PAIN: ICD-10-CM

## 2020-07-16 DIAGNOSIS — S76.011A HIP STRAIN, RIGHT, INITIAL ENCOUNTER: Primary | ICD-10-CM

## 2020-07-16 DIAGNOSIS — M25.559 HIP PAIN: ICD-10-CM

## 2020-07-16 PROCEDURE — 99284 EMERGENCY DEPT VISIT MOD MDM: CPT | Mod: 25

## 2020-07-16 RX ORDER — DICLOFENAC SODIUM 50 MG/1
50 TABLET, DELAYED RELEASE ORAL 2 TIMES DAILY
Qty: 20 TABLET | Refills: 0 | Status: SHIPPED | OUTPATIENT
Start: 2020-07-16 | End: 2020-12-03

## 2020-07-16 RX ORDER — METHOCARBAMOL 500 MG/1
1000 TABLET, FILM COATED ORAL 3 TIMES DAILY
Qty: 30 TABLET | Refills: 0 | Status: SHIPPED | OUTPATIENT
Start: 2020-07-16 | End: 2020-07-21

## 2020-07-16 NOTE — ED PROVIDER NOTES
Encounter Date: 7/16/2020       History     Chief Complaint   Patient presents with    Leg Pain     right upper thigh pain      The history is provided by the patient.   Leg Pain   The incident occurred at home. There was no injury mechanism. The incident occurred several days ago. The pain is present in the right hip and right thigh. The quality of the pain is described as throbbing. The pain is at a severity of 3/10. The pain has been constant since onset. Pertinent negatives include no numbness, no inability to bear weight, no loss of motion, no muscle weakness, no loss of sensation and no tingling. She reports no foreign bodies present. The symptoms are aggravated by activity, bearing weight and palpation.     Review of patient's allergies indicates:  No Known Allergies  Past Medical History:   Diagnosis Date    Miscarriage      No past surgical history on file.  Family History   Problem Relation Age of Onset    Birth defects Neg Hx      Social History     Tobacco Use    Smoking status: Never Smoker    Smokeless tobacco: Never Used   Substance Use Topics    Alcohol use: No    Drug use: No     Review of Systems   Constitutional: Negative for chills and fever.   HENT: Negative for sore throat.    Eyes: Negative for photophobia and redness.   Respiratory: Negative for cough and shortness of breath.    Cardiovascular: Negative for chest pain.   Gastrointestinal: Negative for abdominal pain, diarrhea and nausea.   Endocrine: Negative for polydipsia and polyphagia.   Genitourinary: Negative for dysuria.   Musculoskeletal: Negative for arthralgias, back pain and myalgias.   Skin: Negative for rash.   Neurological: Negative for tingling, weakness, numbness and headaches.   Hematological: Does not bruise/bleed easily.   Psychiatric/Behavioral: The patient is not nervous/anxious.    All other systems reviewed and are negative.      Physical Exam     Initial Vitals [07/16/20 0842]   BP Pulse Resp Temp SpO2   122/76  110 18 98.6 °F (37 °C) 98 %      MAP       --         Physical Exam    Nursing note and vitals reviewed.  Constitutional: Vital signs are normal. She appears well-developed and well-nourished. No distress.   HENT:   Head: Normocephalic and atraumatic.   Right Ear: External ear normal.   Left Ear: External ear normal.   Nose: Nose normal.   Mouth/Throat: Oropharynx is clear and moist.   Eyes: Conjunctivae, EOM and lids are normal. Pupils are equal, round, and reactive to light.   Neck: Normal range of motion and full passive range of motion without pain. Neck supple.   Cardiovascular: Normal rate, regular rhythm, S1 normal, S2 normal, normal heart sounds, intact distal pulses and normal pulses.   Pulmonary/Chest: Breath sounds normal. No respiratory distress. She has no wheezes. She has no rales.   Abdominal: Soft. Normal appearance and bowel sounds are normal. She exhibits no distension. There is no abdominal tenderness.   Musculoskeletal:      Right hip: She exhibits decreased range of motion, decreased strength, tenderness and bony tenderness. She exhibits no swelling, no crepitus, no deformity and no laceration.   Lymphadenopathy:     She has no cervical adenopathy.   Neurological: She is alert and oriented to person, place, and time. She has normal strength. No cranial nerve deficit or sensory deficit. Coordination and gait normal.   Skin: Skin is warm, dry and intact.   Psychiatric: She has a normal mood and affect. Her speech is normal and behavior is normal. Judgment and thought content normal. Cognition and memory are normal.         ED Course   Procedures  Labs Reviewed - No data to display       Imaging Results    None                                          Clinical Impression:       ICD-10-CM ICD-9-CM   1. Hip strain, right, initial encounter  S76.011A 843.9   2. Hip pain  M25.559 719.45   3. Leg pain  M79.606 729.5         Disposition:   Disposition: Discharged  Condition: Stable                         KELSEY Olivas  07/16/20 0938

## 2020-07-16 NOTE — Clinical Note
Miriam Jett was seen and treated in our emergency department on 7/16/2020.  She may return to work on 07/21/2020.       If you have any questions or concerns, please don't hesitate to call.      KELSEY Olivas

## 2020-10-19 DIAGNOSIS — M25.552 LEFT HIP PAIN: ICD-10-CM

## 2020-10-19 DIAGNOSIS — M25.562 LEFT KNEE PAIN, UNSPECIFIED CHRONICITY: Primary | ICD-10-CM

## 2020-11-04 ENCOUNTER — HOSPITAL ENCOUNTER (OUTPATIENT)
Dept: RADIOLOGY | Facility: HOSPITAL | Age: 25
Discharge: HOME | End: 2020-11-04
Attending: ORTHOPAEDIC SURGERY
Payer: MEDICAID

## 2020-11-04 ENCOUNTER — OFFICE VISIT (OUTPATIENT)
Dept: ORTHOPEDICS | Facility: CLINIC | Age: 25
End: 2020-11-04
Payer: COMMERCIAL

## 2020-11-04 VITALS
HEART RATE: 79 BPM | BODY MASS INDEX: 27.31 KG/M2 | HEIGHT: 67 IN | DIASTOLIC BLOOD PRESSURE: 86 MMHG | WEIGHT: 174 LBS | SYSTOLIC BLOOD PRESSURE: 139 MMHG

## 2020-11-04 DIAGNOSIS — M87.051 AVASCULAR NECROSIS OF BONE OF RIGHT HIP: Primary | ICD-10-CM

## 2020-11-04 DIAGNOSIS — M25.552 LEFT HIP PAIN: ICD-10-CM

## 2020-11-04 PROCEDURE — 99203 OFFICE O/P NEW LOW 30 MIN: CPT | Mod: S$GLB,,, | Performed by: ORTHOPAEDIC SURGERY

## 2020-11-04 PROCEDURE — 99999 PR PBB SHADOW E&M-EST. PATIENT-LVL III: CPT | Mod: PBBFAC,,, | Performed by: ORTHOPAEDIC SURGERY

## 2020-11-04 PROCEDURE — 73503 X-RAY EXAM HIP UNI 4/> VIEWS: CPT | Mod: TC,LT

## 2020-11-04 PROCEDURE — 73503 XR HIP 4 OR MORE VIEWS LEFT: ICD-10-PCS | Mod: 26,LT,, | Performed by: RADIOLOGY

## 2020-11-04 PROCEDURE — 99203 PR OFFICE/OUTPT VISIT, NEW, LEVL III, 30-44 MIN: ICD-10-PCS | Mod: S$GLB,,, | Performed by: ORTHOPAEDIC SURGERY

## 2020-11-04 PROCEDURE — 73503 X-RAY EXAM HIP UNI 4/> VIEWS: CPT | Mod: 26,LT,, | Performed by: RADIOLOGY

## 2020-11-04 PROCEDURE — 99999 PR PBB SHADOW E&M-EST. PATIENT-LVL III: ICD-10-PCS | Mod: PBBFAC,,, | Performed by: ORTHOPAEDIC SURGERY

## 2020-11-04 NOTE — PATIENT INSTRUCTIONS
Assessment:  Miriam Jett is a 25 y.o. female [employer] employee who slipped at work on 8/8/20 and injured her right hip.  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  · We need her to obtain a copy of her MRI disc and report and bring it to us to upload prior to her next appointment.  · She will obtain a CT scan of the pelvis.  · She may continue to work light duty  · Treatment plan was developed with input from the patient/family, and they expressed understanding and agreement with the plan. All questions were answered today.     Follow-up: after CT scan or sooner if there are any problems between now and then.    Thank you for choosing [facility] and Dr. Porfirio Rosales for your orthopedic & sports medicine care. It is our goal to provide you with exceptional care that will help keep you healthy, active, and get you back in the game.    If you felt that you received exemplary care today, please consider leaving us feedback on [employer] at [email or web address].     Please do not hesitate to reach out to us via email, phone, or MyChart with any questions, concerns, or feedback.    If you are experiencing pain/discomfort ,or have questions after 5pm and would like to be connected to the [facility] on-call team, please call this number and specify which Sports Medicine provider is treating you: [phone]

## 2020-11-04 NOTE — PROGRESS NOTES
Patient ID: Miriam Jett  YOB: 1995  MRN: [id number]    Chief Complaint: Pain of the Right Hip    Referred By: Lawyer Alban Pedroza with [facility]    History of Present Illness: Miriam Jett is a righ-hand dominant 25 y.o. female [employer] employee here for her right hip. 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 Patient went to [facility] ED when she first fell.  She was referred to multiple orthopedists around the area but her appointments were never approved.  Her PCP ordered an MRI done at [facility]. She does not have this MRI with her today.  She has pain with hip motion, walking, bending and squatting.  She has been working light duty at work.    She does not have sickle cell trait.    Hip Pain   The pain is present in the right hip. The pain radiates to the right thigh. The current episode started more than 1 month ago. The injury was the result of a falling action while at work. The problem occurs constantly. The problem has been gradually worsening. The quality of the pain is described as aching, sharp and throbbing. The pain is at a severity of 10/10. Associated symptoms include joint swelling and stiffness. Pertinent negatives include no fever or itching. Associated symptoms comments: Popping . The symptoms are aggravated by lying down, sitting, standing, exercise, activity and bending. She has tried OTC pain meds and NSAIDs for the symptoms. The treatment provided no relief. Physical therapy was not tried.  Pain  Pertinent negatives include no fever, sore throat or vomiting.       Past Medical History:   Past Medical History:   Diagnosis Date    Miscarriage      History reviewed. No pertinent surgical history.  Family History   Problem Relation Age of Onset    Birth defects Neg Hx      Social History     Socioeconomic History    Marital status: Single     Spouse name: Not on file    Number of children: Not on file     Years of education: Not on file    Highest education level: Not on file   Occupational History    Not on file   Social Needs    Financial resource strain: Not on file    Food insecurity     Worry: Not on file     Inability: Not on file    Transportation needs     Medical: Not on file     Non-medical: Not on file   Tobacco Use    Smoking status: Never Smoker    Smokeless tobacco: Never Used   Substance and Sexual Activity    Alcohol use: No    Drug use: No    Sexual activity: Yes     Partners: Male     Birth control/protection: None   Lifestyle    Physical activity     Days per week: Not on file     Minutes per session: Not on file    Stress: Not on file   Relationships    Social connections     Talks on phone: Not on file     Gets together: Not on file     Attends Congregation service: Not on file     Active member of club or organization: Not on file     Attends meetings of clubs or organizations: Not on file     Relationship status: Not on file   Other Topics Concern    Not on file   Social History Narrative    Not on file     Medication List with Changes/Refills   Current Medications    DICLOFENAC (VOLTAREN) 50 MG EC TABLET    Take 1 tablet (50 mg total) by mouth 2 (two) times daily.    HYDROCODONE-ACETAMINOPHEN (NORCO) 5-325 MG PER TABLET    Take 1 tablet by mouth every 4 (four) hours as needed.    ONDANSETRON (ZOFRAN-ODT) 4 MG TBDL    Take 1 tablet (4 mg total) by mouth every 6 (six) hours as needed.    PROMETHAZINE (PHENERGAN) 25 MG TABLET    Take 1 tablet (25 mg total) by mouth every 6 (six) hours as needed for Nausea.     Review of patient's allergies indicates:  No Known Allergies  Review of Systems   Constitution: Negative for fever.   HENT: Negative for sore throat.    Eyes: Negative for blurred vision.   Cardiovascular: Negative for dyspnea on exertion.   Respiratory: Negative for shortness of breath.    Hematologic/Lymphatic: Does not bruise/bleed easily.   Skin: Negative for itching.    Musculoskeletal: Positive for joint pain and stiffness.   Gastrointestinal: Negative for vomiting.   Genitourinary: Negative for dysuria.   Neurological: Negative for dizziness.   Psychiatric/Behavioral: The patient does not have insomnia.        Physical Exam:   Body mass index is 27.25 kg/m².  Vitals:    11/04/20 1115   BP: 139/86   Pulse: 79      GENERAL: Well appearing, appropriate for stated age, no acute distress.  CARDIOVASCULAR: Pulses regular by peripheral palpation.  PULMONARY: Respirations are even and non-labored.  NEURO: Awake, alert, and oriented x 3.  PSYCH: Mood & affect are appropriate.  HEENT: Head is normocephalic and atraumatic.    Right Hip:    Inspection: Normal    Palpation: Tender to palpation at None    Range of motion: 110 deg Flexion       20 deg IR       50 deg ER    Strength:  5-/5 Extension    5-/5 Flexion    5-/5 Abduction    5-/5 Adduction    N/V Exam:  Tibial:    Normal sensory (plantar foot)  Normal motor (FHL)    Sup Peroneal:   Normal sensory (dorsal foot)  Normal motor (Peroneals)            Deep Peroneal:   Normal sensory (1st web space)  Normal motor (EHL)    Sural:   Normal sensory (lateral foot)   Saphenous:   Normal sensory (medial lower leg)    Normal pedal pulses, warm and well perfused with capillary refill < 2 sec       Left Hip:    Inspection: Normal    Palpation: Tender to palpation    Range of motion: 120 deg Flexion         30 deg IR         50 deg ER    Strength:  5/5 Extension    5/5 Flexion    5/5 Abduction    5/5 Adduction    N/V Exam:  Tibial:    Normal sensory (plantar foot)  Normal motor (FHL)    Sup Peroneal:   Normal sensory (dorsal foot)  Normal motor (Peroneals)            Deep Peroneal:   Normal sensory (1st web space)  Normal motor (EHL)    Sural:   Normal sensory (lateral foot)   Saphenous:   Normal sensory (medial lower leg)     Normal pedal pulses, warm and well perfused with capillary refill < 2 sec     Relevant imaging results reviewed and  interpreted by me, discussed with the patient and / or family today. I agree with findings stated above.       Imaging:     X-Ray Hip 4 or more views Left  Order: [id number]  Status:  Final result   Visible to patient:  Yes (Patient Portal) Next appt:  None Dx:  Left hip pain  Details    Reading Physician Reading Date Result Priority   Saul Rose MD  [phone] 11/4/2020 Routine      Narrative & Impression     EXAMINATION:  XR HIP 4 OR MORE VIEWS LEFT     CLINICAL HISTORY:  Pain in left hip     TECHNIQUE:  AP pelvis with three view right hip     COMPARISON:  None     FINDINGS:  Interval increase in sclerosis of the right femoral head as well as cystic change present.  There is volume loss and superior right femoral head collapse.  Findings consistent with progressing osteonecrosis.     Left femoral head demonstrates similar sclerosis suggestive of osteonecrosis (AVN) as well.     Remaining osseous and soft tissue structures stable.     Impression:     As above        Electronically signed by: Saul Rose MD  Date:                                            11/04/2020  Time:                                           10:52               Assessment:  Miriam Jett is a 25 y.o. female [employer] employee who slipped at work on 8/8/20 and injured her right hip.  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    Avascular necrosis of bone of right hip Yes        Plan:   Miriam's XR shows AVN of the right femoral head that looks to be fairly advanced.   We need her to obtain a copy of her MRI disc and report and bring it to us to upload prior to her next appointment.   She will obtain a CT scan of the pelvis.   Treatment plan was developed with input from the patient/family, and they expressed understanding and agreement with the plan. All questions were answered today.    Follow-up: after CT scan or sooner if there are any problems between now and  then.    Disclaimer: This note was prepared using a voice recognition system and is likely to have sound alike errors within the text.     I, Chriss Schwarz, acted as a scribe for Dr. Porfirio Rosales for the duration of this office visit.

## 2020-11-04 NOTE — LETTER
November 4, 2020      [facility]  [address]  [place] [address]  Phone: [phone]  Fax: [phone]       Patient: Miriam Jett   YOB: 1995  Date of Visit: 11/04/2020    To Whom It May Concern:    Noemi Jett  was at [facility] on 11/04/2020. She may return to work  with restrictions: Light duty, limit walking, frequent sitting breaks, no lifting over 20 lbs.  If you have any questions or concerns, or if I can be of further assistance, please do not hesitate to contact me.    Sincerely,    Porfirio Rosales MD

## 2020-11-04 NOTE — PROGRESS NOTES
Patient ID: Miriam Jett  YOB: 1995  MRN: [id number]    Chief Complaint: No chief complaint on file.    Referred By: Lawyer Alban Pedroza with [employer] [facility]    History of Present Illness: Miriam Jett is a righ-hand dominant 25 y.o. female [employer], here for her right hip. Patient states that she fell at work on 8/8/2020. Patient went to [facility] ED when she first fell and had a MRI done at [facility].     Hip Pain   The pain is present in the right hip. The pain radiates to the right thigh. The current episode started more than 1 month ago. The injury was the result of a falling action while at work. The problem occurs constantly. The problem has been gradually worsening. The quality of the pain is described as aching, sharp and throbbing. The pain is at a severity of 10/10. Associated symptoms include joint swelling and stiffness. Pertinent negatives include no fever or itching. Associated symptoms comments: Popping . The symptoms are aggravated by lying down, sitting, standing, exercise, activity and bending. She has tried OTC pain meds and NSAIDs for the symptoms. The treatment provided no relief. Physical therapy was not tried.      Past Medical History:   Past Medical History:   Diagnosis Date    Miscarriage      No past surgical history on file.  Family History   Problem Relation Age of Onset    Birth defects Neg Hx      Social History     Socioeconomic History    Marital status: Single     Spouse name: Not on file    Number of children: Not on file    Years of education: Not on file    Highest education level: Not on file   Occupational History    Not on file   Social Needs    Financial resource strain: Not on file    Food insecurity     Worry: Not on file     Inability: Not on file    Transportation needs     Medical: Not on file     Non-medical: Not on file   Tobacco Use    Smoking status: Never Smoker    Smokeless tobacco: Never Used   Substance  and Sexual Activity    Alcohol use: No    Drug use: No    Sexual activity: Yes     Partners: Male     Birth control/protection: None   Lifestyle    Physical activity     Days per week: Not on file     Minutes per session: Not on file    Stress: Not on file   Relationships    Social connections     Talks on phone: Not on file     Gets together: Not on file     Attends [employer] service: Not on file     Active member of club or organization: Not on file     Attends meetings of clubs or organizations: Not on file     Relationship status: Not on file   Other Topics Concern    Not on file   Social History Narrative    Not on file     Medication List with Changes/Refills   Current Medications    DICLOFENAC (VOLTAREN) 50 MG EC TABLET    Take 1 tablet (50 mg total) by mouth 2 (two) times daily.    HYDROCODONE-ACETAMINOPHEN (NORCO) 5-325 MG PER TABLET    Take 1 tablet by mouth every 4 (four) hours as needed.    ONDANSETRON (ZOFRAN-ODT) 4 MG TBDL    Take 1 tablet (4 mg total) by mouth every 6 (six) hours as needed.    PROMETHAZINE (PHENERGAN) 25 MG TABLET    Take 1 tablet (25 mg total) by mouth every 6 (six) hours as needed for Nausea.     Review of patient's allergies indicates:  No Known Allergies  Review of Systems   Constitution: Negative for fever.   HENT: Negative for sore throat.    Eyes: Negative for blurred vision.   Cardiovascular: Negative for dyspnea on exertion.   Respiratory: Negative for shortness of breath.    Hematologic/Lymphatic: Does not bruise/bleed easily.   Skin: Negative for itching.   Musculoskeletal: Positive for joint pain and stiffness.   Gastrointestinal: Negative for vomiting.   Genitourinary: Negative for dysuria.   Neurological: Negative for dizziness.   Psychiatric/Behavioral: The patient does not have insomnia.        Physical Exam:   There is no height or weight on file to calculate BMI.  There were no vitals filed for this visit.   GENERAL: Well appearing, appropriate for stated  age, no acute distress.  CARDIOVASCULAR: Pulses regular by peripheral palpation.  PULMONARY: Respirations are even and non-labored.  NEURO: Awake, alert, and oriented x 3.  PSYCH: Mood & affect are appropriate.  HEENT: Head is normocephalic and atraumatic.  Ortho/SPM Exam  ***    Imaging:    X-Ray Hip 4 or more views Left  Narrative: EXAMINATION:  XR HIP 4 OR MORE VIEWS LEFT    CLINICAL HISTORY:  Pain in left hip    TECHNIQUE:  AP pelvis with three view right hip    COMPARISON:  None    FINDINGS:  Interval increase in sclerosis of the right femoral head as well as cystic change present.  There is volume loss and superior right femoral head collapse.  Findings consistent with progressing osteonecrosis.    Left femoral head demonstrates similar sclerosis suggestive of osteonecrosis (AVN) as well.    Remaining osseous and soft tissue structures stable.  Impression: As above    Electronically signed by: Saul Rose MD  Date:    11/04/2020  Time:    10:52    ***  Relevant imaging results reviewed and interpreted by me, discussed with the patient and / or family today. ***    Other Tests:     No other tests performed today.***    Assessment:  Miriam Jett is a 25 y.o. female ***   ***    No diagnosis found.     Plan:   ***   Treatment plan was developed with input from the patient/family, and they expressed understanding and agreement with the plan. All questions were answered today.    Follow-up: *** or sooner if there are any problems between now and then.    Disclaimer: This note was prepared using a voice recognition system and is likely to have sound alike errors within the text.

## 2020-11-11 ENCOUNTER — TELEPHONE (OUTPATIENT)
Dept: RADIOLOGY | Facility: HOSPITAL | Age: 25
End: 2020-11-11

## 2020-11-12 ENCOUNTER — HOSPITAL ENCOUNTER (OUTPATIENT)
Dept: RADIOLOGY | Facility: HOSPITAL | Age: 25
Discharge: HOME | End: 2020-11-12
Attending: ORTHOPAEDIC SURGERY
Payer: MEDICAID

## 2020-11-12 DIAGNOSIS — M87.051 AVASCULAR NECROSIS OF BONE OF RIGHT HIP: ICD-10-CM

## 2020-11-12 PROCEDURE — 72192 CT PELVIS W/O DYE: CPT | Mod: 26,,, | Performed by: RADIOLOGY

## 2020-11-12 PROCEDURE — 72192 CT PELVIS W/O DYE: CPT | Mod: TC

## 2020-11-12 PROCEDURE — 72192 CT PELVIS WITHOUT CONTRAST: ICD-10-PCS | Mod: 26,,, | Performed by: RADIOLOGY

## 2020-11-17 ENCOUNTER — TELEPHONE (OUTPATIENT)
Dept: ORTHOPEDICS | Facility: CLINIC | Age: 25
End: 2020-11-17

## 2020-11-18 ENCOUNTER — PATIENT MESSAGE (OUTPATIENT)
Dept: ORTHOPEDICS | Facility: CLINIC | Age: 25
End: 2020-11-18

## 2020-11-30 ENCOUNTER — TELEPHONE (OUTPATIENT)
Dept: ORTHOPEDICS | Facility: CLINIC | Age: 25
End: 2020-11-30

## 2020-11-30 ENCOUNTER — OFFICE VISIT (OUTPATIENT)
Dept: ORTHOPEDICS | Facility: CLINIC | Age: 25
End: 2020-11-30
Payer: COMMERCIAL

## 2020-11-30 VITALS
WEIGHT: 174 LBS | HEIGHT: 67 IN | HEART RATE: 81 BPM | SYSTOLIC BLOOD PRESSURE: 130 MMHG | DIASTOLIC BLOOD PRESSURE: 81 MMHG | BODY MASS INDEX: 27.31 KG/M2

## 2020-11-30 DIAGNOSIS — M87.052 AVASCULAR NECROSIS OF BONE OF LEFT HIP: ICD-10-CM

## 2020-11-30 DIAGNOSIS — M87.051 AVASCULAR NECROSIS OF BONE OF RIGHT HIP: Primary | ICD-10-CM

## 2020-11-30 PROCEDURE — 99214 OFFICE O/P EST MOD 30 MIN: CPT | Mod: S$GLB,,, | Performed by: ORTHOPAEDIC SURGERY

## 2020-11-30 PROCEDURE — 99999 PR PBB SHADOW E&M-EST. PATIENT-LVL III: ICD-10-PCS | Mod: PBBFAC,,, | Performed by: ORTHOPAEDIC SURGERY

## 2020-11-30 PROCEDURE — 99214 PR OFFICE/OUTPT VISIT, EST, LEVL IV, 30-39 MIN: ICD-10-PCS | Mod: S$GLB,,, | Performed by: ORTHOPAEDIC SURGERY

## 2020-11-30 PROCEDURE — 99999 PR PBB SHADOW E&M-EST. PATIENT-LVL III: CPT | Mod: PBBFAC,,, | Performed by: ORTHOPAEDIC SURGERY

## 2020-11-30 NOTE — PROGRESS NOTES
Patient ID: Miriam Jett  YOB: 1995  MRN: [id number]    Chief Complaint: Follow-up of the Pelvis    Referred By: Lawyer Alban Pedroza with [facility]    History of Present Illness: Miriam Jett is a right-hand dominant 25 y.o. female  [employer] employee here for her right hip/pelvis CT review. 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 Patient states that her hip is killing her and the pain is a 10/10. She has radiating pain that goes all the way into her right foot. She states the pain in her foot is a numbness and tingling type of pain. She states she has pain with everything now and feels that it is debilitating. She denies any fever, chills, or night sweats.    Hip Pain   This is a recurrent problem. The current episode started more than 1 month ago. The problem occurs constantly. The problem has been gradually worsening. The quality of the pain is described as sharp and throbbing. The pain is at a severity of 10/10. Associated symptoms include a limited range of motion, numbness, stiffness and tingling. Pertinent negatives include no fever or itching. The symptoms are aggravated by activity, walking, bearing weight and exercise.       Past Medical History:   Past Medical History:   Diagnosis Date    Miscarriage      History reviewed. No pertinent surgical history.  Family History   Problem Relation Age of Onset    Birth defects Neg Hx      Social History     Socioeconomic History    Marital status: Single     Spouse name: Not on file    Number of children: Not on file    Years of education: Not on file    Highest education level: Not on file   Occupational History    Not on file   Social Needs    Financial resource strain: Not on file    Food insecurity     Worry: Not on file     Inability: Not on file    Transportation needs     Medical: Not on file     Non-medical: Not on file   Tobacco Use    Smoking status: Never  Smoker    Smokeless tobacco: Never Used   Substance and Sexual Activity    Alcohol use: No    Drug use: No    Sexual activity: Yes     Partners: Male     Birth control/protection: None   Lifestyle    Physical activity     Days per week: Not on file     Minutes per session: Not on file    Stress: Not on file   Relationships    Social connections     Talks on phone: Not on file     Gets together: Not on file     Attends Shinto service: Not on file     Active member of club or organization: Not on file     Attends meetings of clubs or organizations: Not on file     Relationship status: Not on file   Other Topics Concern    Not on file   Social History Narrative    Not on file     Medication List with Changes/Refills   Current Medications    DICLOFENAC (VOLTAREN) 50 MG EC TABLET    Take 1 tablet (50 mg total) by mouth 2 (two) times daily.    HYDROCODONE-ACETAMINOPHEN (NORCO) 5-325 MG PER TABLET    Take 1 tablet by mouth every 4 (four) hours as needed.    ONDANSETRON (ZOFRAN-ODT) 4 MG TBDL    Take 1 tablet (4 mg total) by mouth every 6 (six) hours as needed.    PROMETHAZINE (PHENERGAN) 25 MG TABLET    Take 1 tablet (25 mg total) by mouth every 6 (six) hours as needed for Nausea.     Review of patient's allergies indicates:  No Known Allergies  Review of Systems   Constitution: Negative for chills, fever and night sweats.   Cardiovascular: Negative for chest pain.   Respiratory: Negative for cough and shortness of breath.    Skin: Negative for itching and rash.   Musculoskeletal: Positive for joint pain and stiffness.   Gastrointestinal: Negative for nausea and vomiting.   Neurological: Positive for numbness and tingling.       Physical Exam:   There is no height or weight on file to calculate BMI.  There were no vitals filed for this visit.   GENERAL: Well appearing, appropriate for stated age, no acute distress.  CARDIOVASCULAR: Pulses regular by peripheral palpation.  PULMONARY: Respirations are even and  non-labored.  NEURO: Awake, alert, and oriented x 3.  PSYCH: Mood & affect are appropriate.  HEENT: Head is normocephalic and atraumatic.  Ortho/SPM Exam  ***    Imaging:    CT Pelvis Without Contrast  Narrative: EXAMINATION:  CT PELVIS WITHOUT CONTRAST    CLINICAL HISTORY:  avascular necrosis bilateral hips;  Idiopathic aseptic necrosis of right femur    TECHNIQUE:  Routine noncontrast imaging of the pelvis performed.    COMPARISON:  Recent radiograph    FINDINGS:  Right greater than left femoral head osteonecrosis (AVN) findings present.  Superolateral right femoral head cystic change present with superior femoral head subchondral fracturing/collapse related to advanced AVN.  Remaining osseous structures are unremarkable.  Moderate right hip joint effusion present.    Visualized intra pelvic soft tissue structures unremarkable.  Impression: Right greater than left femoral head AVN findings as above.    Electronically signed by: Saul Rose MD  Date:    11/12/2020  Time:    09:36    ***  Relevant imaging results reviewed and interpreted by me, discussed with the patient and / or family today. ***    Other Tests:     No other tests performed today.***    Assessment:  Miriam Jett is a 25 y.o. female ***   ***    No diagnosis found.     Plan:   ***   Treatment plan was developed with input from the patient/family, and they expressed understanding and agreement with the plan. All questions were answered today.    Follow-up: *** or sooner if there are any problems between now and then.    Disclaimer: This note was prepared using a voice recognition system and is likely to have sound alike errors within the text.

## 2020-11-30 NOTE — PROGRESS NOTES
Patient ID: Miriam Jett  YOB: 1995  MRN: [id number]    Chief Complaint: Follow-up of the Pelvis    Referred By: Lawyer Alban Pedroza with [facility]    History of Present Illness: Miriam Jett is a right-hand dominant 25 y.o. female [employer] employee here for her right hip/pelvis CT review. 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 Patient went to [facility] ED when she first fell.  She was referred to multiple orthopedists around the area but her appointments were never approved.  Her PCP ordered an MRI done at [facility].      She was initially seen in our office on 11/4/20. She was instructed to return with a CT scan and her MRI disc for us to upload. She reports that she has been having right sided pain radiating down her thigh and all the way to her foot.    Patient states that her hip is killing her and the pain is a 10/10. She has radiating pain that goes all the way into her right foot. She states the pain in her foot is a numbness and tingling type of pain. She states she has pain with everything now and feels that it is debilitating. She denies any fever, chills, or night sweats.    Hip Pain   This is a recurrent problem. The current episode started more than 1 month ago. The problem occurs constantly. The problem has been gradually worsening. The quality of the pain is described as sharp and throbbing. The pain is at a severity of 10/10. Associated symptoms include a limited range of motion, numbness, stiffness and tingling. Pertinent negatives include no fever or itching. The symptoms are aggravated by activity, walking, bearing weight and exercise.   Follow-up  Associated symptoms include numbness. Pertinent negatives include no chest pain, chills, coughing, fever, nausea, rash or vomiting.       Past Medical History:   Past Medical History:   Diagnosis Date    Miscarriage      History reviewed. No pertinent surgical  history.  Family History   Problem Relation Age of Onset    Birth defects Neg Hx      Social History     Socioeconomic History    Marital status: Single     Spouse name: Not on file    Number of children: Not on file    Years of education: Not on file    Highest education level: Not on file   Occupational History    Not on file   Social Needs    Financial resource strain: Not on file    Food insecurity     Worry: Not on file     Inability: Not on file    Transportation needs     Medical: Not on file     Non-medical: Not on file   Tobacco Use    Smoking status: Never Smoker    Smokeless tobacco: Never Used   Substance and Sexual Activity    Alcohol use: No    Drug use: No    Sexual activity: Yes     Partners: Male     Birth control/protection: None   Lifestyle    Physical activity     Days per week: Not on file     Minutes per session: Not on file    Stress: Not on file   Relationships    Social connections     Talks on phone: Not on file     Gets together: Not on file     Attends Nondenominational service: Not on file     Active member of club or organization: Not on file     Attends meetings of clubs or organizations: Not on file     Relationship status: Not on file   Other Topics Concern    Not on file   Social History Narrative    Not on file     Medication List with Changes/Refills   Current Medications    MEGESTROL (MEGACE ES) 625 MG/5 ML (125 MG/ML) SUSP    daily as needed.     PRENATAL VITAMIN PLUS LOW IRON 27 MG IRON- 1 MG TAB    TK 2 TS PO QD   Discontinued Medications    DICLOFENAC (VOLTAREN) 50 MG EC TABLET    Take 1 tablet (50 mg total) by mouth 2 (two) times daily.    HYDROCODONE-ACETAMINOPHEN (NORCO) 5-325 MG PER TABLET    Take 1 tablet by mouth every 4 (four) hours as needed.    ONDANSETRON (ZOFRAN-ODT) 4 MG TBDL    Take 1 tablet (4 mg total) by mouth every 6 (six) hours as needed.    PROMETHAZINE (PHENERGAN) 25 MG TABLET    Take 1 tablet (25 mg total) by mouth every 6 (six) hours as needed  for Nausea.     Review of patient's allergies indicates:  No Known Allergies  Review of Systems   Constitution: Negative for chills, fever and night sweats.   Cardiovascular: Negative for chest pain.   Respiratory: Negative for cough and shortness of breath.    Skin: Negative for itching and rash.   Musculoskeletal: Positive for joint pain and stiffness.   Gastrointestinal: Negative for nausea and vomiting.   Neurological: Positive for numbness and tingling.       Physical Exam:   Body mass index is 27.25 kg/m².  Vitals:    11/30/20 1010   BP: 130/81   Pulse: 81      GENERAL: Well appearing, appropriate for stated age, no acute distress.  CARDIOVASCULAR: Pulses regular by peripheral palpation.  PULMONARY: Respirations are even and non-labored.  NEURO: Awake, alert, and oriented x 3.  PSYCH: Mood & affect are appropriate.  HEENT: Head is normocephalic and atraumatic.    Right Hip:     Inspection: Normal     Palpation: Tender to palpation at None     Range of motion: 90 deg Flexion                                 10 deg IR                                 35 deg ER     Strength:            3/5 Flexion                              Special Tests: Negative Log Roll    Pain with Hip Flexion    Negative SLR     N/V Exam:        Tibial:                               Normal sensory (plantar foot)                   Normal motor (FHL)                  Sup Peroneal:                  Normal sensory (dorsal foot)                     Normal motor (Peroneals)                                     Deep Peroneal:                Normal sensory (1st web space)                Normal motor (EHL)                  Sural:                                Normal sensory (lateral foot)                Saphenous:                      Normal sensory (medial lower leg)     Normal pedal pulses, warm and well perfused with capillary refill < 2 sec      Left Hip:     Inspection: Normal     Palpation: Tender to palpation     Range of motion: 95 deg  Flexion                                   25 deg IR                                   75 deg ER     Strength:            4/5 Flexion                                N/V Exam:        Tibial:                               Normal sensory (plantar foot)                   Normal motor (FHL)                  Sup Peroneal:                  Normal sensory (dorsal foot)                     Normal motor (Peroneals)                                     Deep Peroneal:                Normal sensory (1st web space)                Normal motor (EHL)                  Sural:                                Normal sensory (lateral foot)                Saphenous:                      Normal sensory (medial lower leg)                  Normal pedal pulses, warm and well perfused with capillary refill < 2 sec       Imaging:    CT Pelvis Without Contrast  Narrative: EXAMINATION:  CT PELVIS WITHOUT CONTRAST    CLINICAL HISTORY:  avascular necrosis bilateral hips;  Idiopathic aseptic necrosis of right femur    TECHNIQUE:  Routine noncontrast imaging of the pelvis performed.    COMPARISON:  Recent radiograph    FINDINGS:  Right greater than left femoral head osteonecrosis (AVN) findings present.  Superolateral right femoral head cystic change present with superior femoral head subchondral fracturing/collapse related to advanced AVN.  Remaining osseous structures are unremarkable.  Moderate right hip joint effusion present.    Visualized intra pelvic soft tissue structures unremarkable.  Impression: Right greater than left femoral head AVN findings as above.    Electronically signed by: Saul Rose MD  Date:    11/12/2020  Time:    09:36    Relevant imaging results reviewed and interpreted by me, discussed with the patient and / or family today. I agree with findings stated above.       Other Tests:     No other tests performed today.    Assessment:  Emeteriochase Jett is a 25 y.o. female [employer] employee with right hip pain s/p  work injury on 8/8/20.  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    Avascular necrosis of bone of right hip Yes    Avascular necrosis of bone of left hip         Plan:   Miriam's MRI/CT scans show bilateral AVN, more advanced with collapse on the right, less advanced without collapse on the left.   Refer to Total Joint Surgeon to discuss MADHU.   Refer to bone health clinic to discuss medication options.   If Dr. Krishnamurthy or MADHU specialist feels that MADHU is not indicated for the LEFT hip, we may consider core decompression.   Treatment plan was developed with input from the patient/family, and they expressed understanding and agreement with the plan. All questions were answered today.    Follow-up: with Dr. Krishnamurthy or sooner if there are any problems between now and then.    Disclaimer: This note was prepared using a voice recognition system and is likely to have sound alike errors within the text.     I, Chriss Schwarz, acted as a scribe for Dr. Porfirio Rosales for the duration of this office visit.

## 2020-12-01 ENCOUNTER — PATIENT MESSAGE (OUTPATIENT)
Dept: ORTHOPEDICS | Facility: CLINIC | Age: 25
End: 2020-12-01

## 2020-12-01 ENCOUNTER — TELEPHONE (OUTPATIENT)
Dept: RHEUMATOLOGY | Facility: CLINIC | Age: 25
End: 2020-12-01

## 2020-12-01 DIAGNOSIS — M85.89 OTHER SPECIFIED DISORDERS OF BONE DENSITY AND STRUCTURE, MULTIPLE SITES: Primary | ICD-10-CM

## 2020-12-01 NOTE — TELEPHONE ENCOUNTER
Referral received from Dr. Rosales for pt to be seen with Mary Kay Oconnor for Avascular necrosis of bone of right hip/left.    Appt scheduled 12-3-20 8:30am for bone density to be added & 9am to see Mary Kay Oconnor at [facility], Pt Verbalized understanding. Pt stated she has not had a bone density scan before, none in chart.    Please add dexa order.

## 2020-12-01 NOTE — TELEPHONE ENCOUNTER
Mary Kay Oconnor PA-C  You 1 minute ago (2:55 PM)     Have her come in but no need for a dexa scan yet, I ordered it but I don't want it done for her just yet     Message text

## 2020-12-02 ENCOUNTER — PATIENT MESSAGE (OUTPATIENT)
Dept: RHEUMATOLOGY | Facility: CLINIC | Age: 25
End: 2020-12-02

## 2020-12-02 ENCOUNTER — TELEPHONE (OUTPATIENT)
Dept: RHEUMATOLOGY | Facility: CLINIC | Age: 25
End: 2020-12-02

## 2020-12-02 NOTE — PROGRESS NOTES
Subjective:       Patient ID: Miriam Jett is a 25 y.o. female.    Chief Complaint: Consult (Avascular Necrosis L/R hip)    Miriam is a 26 y/o f referred by dr. Rosales for evaluation of her bone health.  She was seen in his clinic with dx of avascular necrosis of the R hip.  She Reports a work injury, slipped on 8.8.20 and then started with R hip pain.  Imagine revealed AVN of the R hip and subsequently also seen in the L hip.  She denies any history of fracture.  She does not smoke, very occ alcohol.  She is not on oral contraceptive.  No h/o steroid use or current steroid use.  She has no history of dvt.  She has a history of miscarriage at 10 weeks     She does not have sickle cell trait per review of notes     She denies oral ulcers, hair loss, fevers, rashes, no joint pain or swelling, no raynauds, no cp, sob, no blood clots, no weakness.  Overall she is a healthy female who has had no issues until now.  Prev walked without assistance.  Today in a wheelchair, pain in her R hip 10/10.  L hip pain min.     May have an uncle with SLE diagnosis, unsure.         Past Medical History:   Diagnosis Date    Miscarriage      History reviewed. No pertinent surgical history.  Family History   Problem Relation Age of Onset    Birth defects Neg Hx      Social History     Socioeconomic History    Marital status: Single     Spouse name: Not on file    Number of children: Not on file    Years of education: Not on file    Highest education level: Not on file   Occupational History    Not on file   Social Needs    Financial resource strain: Not on file    Food insecurity     Worry: Not on file     Inability: Not on file    Transportation needs     Medical: Not on file     Non-medical: Not on file   Tobacco Use    Smoking status: Never Smoker    Smokeless tobacco: Never Used   Substance and Sexual Activity    Alcohol use: No    Drug use: No    Sexual activity: Yes     Partners: Male     Birth  "control/protection: None   Lifestyle    Physical activity     Days per week: Not on file     Minutes per session: Not on file    Stress: Not on file   Relationships    Social connections     Talks on phone: Not on file     Gets together: Not on file     Attends Mandaeism service: Not on file     Active member of club or organization: Not on file     Attends meetings of clubs or organizations: Not on file     Relationship status: Not on file   Other Topics Concern    Not on file   Social History Narrative    Not on file     Review of patient's allergies indicates:  No Known Allergies  Review of Systems   Constitutional: Negative for chills, fatigue and fever.   HENT: Negative for mouth sores, rhinorrhea and sore throat.    Eyes: Negative for pain and redness.   Respiratory: Negative for cough and shortness of breath.    Cardiovascular: Negative for chest pain.   Gastrointestinal: Negative for abdominal pain, constipation, diarrhea, nausea and vomiting.   Genitourinary: Negative for dysuria and hematuria.   Musculoskeletal: Positive for arthralgias and gait problem. Negative for joint swelling and myalgias.   Skin: Negative for rash.   Neurological: Negative for weakness, numbness and headaches.   Psychiatric/Behavioral: The patient is not nervous/anxious.          Objective:   /80   Pulse 94   Ht 5' 7" (1.702 m)   Wt 81.2 kg (179 lb 0.2 oz)   LMP 11/15/2020   BMI 28.04 kg/m²   Immunization History   Administered Date(s) Administered    Influenza 11/10/2020              Physical Exam   Constitutional: She is oriented to person, place, and time and well-developed, well-nourished, and in no distress.   HENT:   Head: Normocephalic and atraumatic.   Eyes: Pupils are equal, round, and reactive to light. Right eye exhibits no discharge.   Neck: Normal range of motion.   Cardiovascular: Normal rate, regular rhythm and normal heart sounds.  Exam reveals no friction rub.    Pulmonary/Chest: Effort normal and " breath sounds normal. No respiratory distress.   Abdominal: Soft. She exhibits no distension. There is no abdominal tenderness.   Lymphadenopathy:     She has no cervical adenopathy.   Neurological: She is alert and oriented to person, place, and time.   Skin: No rash noted. No erythema.     Psychiatric: Mood normal.   Musculoskeletal: Normal range of motion. No deformity or edema.      Comments: yahir wrists, mcps, pips no synvoitis, no tenderness, no warmth, good rom  yahir elbows shoulders no swelling or tenderness,full rom  yahir knees no effusion, no warmth, no tenderness, full rom    In wheelchair                  Recent Results (from the past 336 hour(s))   Urinalysis    Collection Time: 12/03/20  9:13 AM   Result Value Ref Range    Specimen UA Urine, Clean Catch     Color, UA Yellow Yellow, Straw, Nany    Appearance, UA Clear Clear    pH, UA 6.0 5.0 - 8.0    Specific Gravity, UA 1.020 1.005 - 1.030    Protein, UA Negative Negative    Glucose, UA Negative Negative    Ketones, UA Negative Negative    Bilirubin (UA) Negative Negative    Occult Blood UA Trace (A) Negative    Nitrite, UA Negative Negative    Leukocytes, UA Negative Negative   CBC Auto Differential    Collection Time: 12/03/20  9:29 AM   Result Value Ref Range    WBC 7.35 3.90 - 12.70 K/uL    RBC 3.89 (L) 4.00 - 5.40 M/uL    Hemoglobin 12.1 12.0 - 16.0 g/dL    Hematocrit 36.3 (L) 37.0 - 48.5 %    MCV 93 82 - 98 fL    MCH 31.1 (H) 27.0 - 31.0 pg    MCHC 33.3 32.0 - 36.0 g/dL    RDW 13.8 11.5 - 14.5 %    Platelets 307 150 - 350 K/uL    MPV 10.3 9.2 - 12.9 fL    Immature Granulocytes 0.5 0.0 - 0.5 %    Gran # (ANC) 4.6 1.8 - 7.7 K/uL    Immature Grans (Abs) 0.04 0.00 - 0.04 K/uL    Lymph # 2.1 1.0 - 4.8 K/uL    Mono # 0.5 0.3 - 1.0 K/uL    Eos # 0.1 0.0 - 0.5 K/uL    Baso # 0.04 0.00 - 0.20 K/uL    nRBC 0 0 /100 WBC    Gran % 62.4 38.0 - 73.0 %    Lymph % 28.7 18.0 - 48.0 %    Mono % 7.1 4.0 - 15.0 %    Eosinophil % 0.8 0.0 - 8.0 %    Basophil % 0.5 0.0 -  1.9 %    Differential Method Automated         No results found for: TBGOLDPLUS   No results found for: HAV, HEPAIGM, HEPBIGM, HEPBCAB, HBEAG, HEPCAB     Assessment:       1. Avascular necrosis of bone of right hip    2. Avascular necrosis of bone of left hip          Impression:   Lauro fem head AVN:  R hip pian increased s/p fall at work, imaging found AVN to both hips, no obvious etiology at this time, do not suspect any issue with her bone density, no history of fx, no known CTD, poss uncle with SLE,  No sig symptoms suggesting a certain CTD at this time   Plan:       dexa not needed, osteoporosis is not a risk factor or AVN of the hip and her age and history give no reason to suspect low bone density.     Given the major risk factors for AVN, from rheumatology standpoint we should evaluate for SLE or other connective tissue disease.   Labs: PAULINA, c3, c4, lupus anticoagulant, anticardiolipin, beta 2 glycoproteins, total complements, cbc, sed rate, crp urine, pro/cr, anti histone    Consider hypercoagulable condition-  will check factor V, protein S and protein C-consider heme referral     Consider genetic specialist if our work up is negative to evaluate for genetic condition     Cont f/u with ortho for hip pain    Will follow up once we get our results and see about follow up   All questions were answered     Case discussed with Dr Ward. Assessment and Plan done in collaboration    Thank you for the consult!    Medical decision making: high complexity. Multiple issues were addressed. Overall, the multiple problems listed are of moderate to high severity that may impact quality of life and activities of daily living. Medications, treatment plan, as well as options and alternatives reviewed and discussed with patient. There was counseling of the patient concerning these issues. Time spend with the patient was at least 45 min with half the time spent in face to face counseling, education, disease state,  treatment options, coordination of care and researching records.

## 2020-12-03 ENCOUNTER — LAB VISIT (OUTPATIENT)
Dept: LAB | Facility: HOSPITAL | Age: 25
End: 2020-12-03
Attending: PHYSICIAN ASSISTANT
Payer: MEDICAID

## 2020-12-03 ENCOUNTER — OFFICE VISIT (OUTPATIENT)
Dept: RHEUMATOLOGY | Facility: CLINIC | Age: 25
End: 2020-12-03
Payer: MEDICAID

## 2020-12-03 VITALS
BODY MASS INDEX: 28.09 KG/M2 | HEART RATE: 94 BPM | SYSTOLIC BLOOD PRESSURE: 135 MMHG | HEIGHT: 67 IN | DIASTOLIC BLOOD PRESSURE: 80 MMHG | WEIGHT: 179 LBS

## 2020-12-03 DIAGNOSIS — M85.89 OTHER SPECIFIED DISORDERS OF BONE DENSITY AND STRUCTURE, MULTIPLE SITES: Primary | ICD-10-CM

## 2020-12-03 DIAGNOSIS — M87.051 AVASCULAR NECROSIS OF BONE OF RIGHT HIP: ICD-10-CM

## 2020-12-03 DIAGNOSIS — M87.052 AVASCULAR NECROSIS OF BONE OF LEFT HIP: ICD-10-CM

## 2020-12-03 LAB
BASOPHILS # BLD AUTO: 0.04 K/UL (ref 0–0.2)
BASOPHILS NFR BLD: 0.5 % (ref 0–1.9)
BILIRUB UR QL STRIP: NEGATIVE
CLARITY UR: CLEAR
COLOR UR: YELLOW
CRP SERPL-MCNC: 2.4 MG/L (ref 0–8.2)
DIFFERENTIAL METHOD: ABNORMAL
EOSINOPHIL # BLD AUTO: 0.1 K/UL (ref 0–0.5)
EOSINOPHIL NFR BLD: 0.8 % (ref 0–8)
ERYTHROCYTE [DISTWIDTH] IN BLOOD BY AUTOMATED COUNT: 13.8 % (ref 11.5–14.5)
ERYTHROCYTE [SEDIMENTATION RATE] IN BLOOD BY WESTERGREN METHOD: 21 MM/HR (ref 0–36)
GLUCOSE UR QL STRIP: NEGATIVE
HCT VFR BLD AUTO: 36.3 % (ref 37–48.5)
HGB BLD-MCNC: 12.1 G/DL (ref 12–16)
HGB UR QL STRIP: ABNORMAL
IMM GRANULOCYTES # BLD AUTO: 0.04 K/UL (ref 0–0.04)
IMM GRANULOCYTES NFR BLD AUTO: 0.5 % (ref 0–0.5)
KETONES UR QL STRIP: NEGATIVE
LEUKOCYTE ESTERASE UR QL STRIP: NEGATIVE
LYMPHOCYTES # BLD AUTO: 2.1 K/UL (ref 1–4.8)
LYMPHOCYTES NFR BLD: 28.7 % (ref 18–48)
MCH RBC QN AUTO: 31.1 PG (ref 27–31)
MCHC RBC AUTO-ENTMCNC: 33.3 G/DL (ref 32–36)
MCV RBC AUTO: 93 FL (ref 82–98)
MONOCYTES # BLD AUTO: 0.5 K/UL (ref 0.3–1)
MONOCYTES NFR BLD: 7.1 % (ref 4–15)
NEUTROPHILS # BLD AUTO: 4.6 K/UL (ref 1.8–7.7)
NEUTROPHILS NFR BLD: 62.4 % (ref 38–73)
NITRITE UR QL STRIP: NEGATIVE
NRBC BLD-RTO: 0 /100 WBC
PH UR STRIP: 6 [PH] (ref 5–8)
PLATELET # BLD AUTO: 307 K/UL (ref 150–350)
PMV BLD AUTO: 10.3 FL (ref 9.2–12.9)
PROT UR QL STRIP: NEGATIVE
RBC # BLD AUTO: 3.89 M/UL (ref 4–5.4)
SP GR UR STRIP: 1.02 (ref 1–1.03)
URN SPEC COLLECT METH UR: ABNORMAL
WBC # BLD AUTO: 7.35 K/UL (ref 3.9–12.7)

## 2020-12-03 PROCEDURE — 86140 C-REACTIVE PROTEIN: CPT

## 2020-12-03 PROCEDURE — 86160 COMPLEMENT ANTIGEN: CPT

## 2020-12-03 PROCEDURE — 85302 CLOT INHIBIT PROT C ANTIGEN: CPT

## 2020-12-03 PROCEDURE — 86146 BETA-2 GLYCOPROTEIN ANTIBODY: CPT | Mod: 59

## 2020-12-03 PROCEDURE — 86147 CARDIOLIPIN ANTIBODY EA IG: CPT | Mod: 59

## 2020-12-03 PROCEDURE — 85025 COMPLETE CBC W/AUTO DIFF WBC: CPT

## 2020-12-03 PROCEDURE — 99205 PR OFFICE/OUTPT VISIT, NEW, LEVL V, 60-74 MIN: ICD-10-PCS | Mod: S$PBB,,, | Performed by: PHYSICIAN ASSISTANT

## 2020-12-03 PROCEDURE — 99205 OFFICE O/P NEW HI 60 MIN: CPT | Mod: S$PBB,,, | Performed by: PHYSICIAN ASSISTANT

## 2020-12-03 PROCEDURE — 99999 PR PBB SHADOW E&M-EST. PATIENT-LVL IV: ICD-10-PCS | Mod: PBBFAC,,, | Performed by: PHYSICIAN ASSISTANT

## 2020-12-03 PROCEDURE — 85652 RBC SED RATE AUTOMATED: CPT

## 2020-12-03 PROCEDURE — 86162 COMPLEMENT TOTAL (CH50): CPT

## 2020-12-03 PROCEDURE — 36415 COLL VENOUS BLD VENIPUNCTURE: CPT

## 2020-12-03 PROCEDURE — 81003 URINALYSIS AUTO W/O SCOPE: CPT

## 2020-12-03 PROCEDURE — 85613 RUSSELL VIPER VENOM DILUTED: CPT

## 2020-12-03 PROCEDURE — 83516 IMMUNOASSAY NONANTIBODY: CPT

## 2020-12-03 PROCEDURE — 99214 OFFICE O/P EST MOD 30 MIN: CPT | Mod: PBBFAC | Performed by: PHYSICIAN ASSISTANT

## 2020-12-03 PROCEDURE — 99999 PR PBB SHADOW E&M-EST. PATIENT-LVL IV: CPT | Mod: PBBFAC,,, | Performed by: PHYSICIAN ASSISTANT

## 2020-12-03 PROCEDURE — 82570 ASSAY OF URINE CREATININE: CPT

## 2020-12-03 PROCEDURE — 86038 ANTINUCLEAR ANTIBODIES: CPT

## 2020-12-03 PROCEDURE — 81241 F5 GENE: CPT

## 2020-12-03 PROCEDURE — 86160 COMPLEMENT ANTIGEN: CPT | Mod: 59

## 2020-12-03 PROCEDURE — 85306 CLOT INHIBIT PROT S FREE: CPT

## 2020-12-03 RX ORDER — MEGESTROL ACETATE 125 MG/ML
SUSPENSION ORAL DAILY PRN
COMMUNITY
Start: 2020-11-23 | End: 2023-02-09

## 2020-12-03 RX ORDER — PRENATAL WITH FERROUS FUM AND FOLIC ACID 3080; 920; 120; 400; 22; 1.84; 3; 20; 10; 1; 12; 200; 27; 25; 2 [IU]/1; [IU]/1; MG/1; [IU]/1; MG/1; MG/1; MG/1; MG/1; MG/1; MG/1; UG/1; MG/1; MG/1; MG/1; MG/1
TABLET ORAL
COMMUNITY
Start: 2020-11-23 | End: 2021-07-27

## 2020-12-03 NOTE — LETTER
December 3, 2020      Porfirio Rosales MD  [address]  [place] [address]           [facility]  [address]  [place] [address]  Phone: [phone]  Fax: [phone]          Patient: Miriam Jett   MR Number: [id number]   YOB: 1995   Date of Visit: 12/3/2020       Dear Dr. Porfirio Rosales:    Thank you for referring Miriam Jett to me for evaluation. Attached you will find relevant portions of my assessment and plan of care.    If you have questions, please do not hesitate to call me. I look forward to following Miriam Jett along with you.    Sincerely,    Mary Kay Oconnor PA-C    Enclosure  CC:  No Recipients    If you would like to receive this communication electronically, please contact [email or web address] or [phone] to request more information on [employer] access.    For providers and/or their staff who would like to refer a patient to [facility], please contact us through our one-stop-shop provider referral line, [facility], at [phone].    If you feel you have received this communication in error or would no longer like to receive these types of communications, please e-mail [email or web address]

## 2020-12-03 NOTE — PATIENT INSTRUCTIONS
Recommend vitamin d 800units   Recommend calcium 1000mg    Work up for any autoimmune disease or abnormality

## 2020-12-04 LAB
ANA SER QL IF: NORMAL
C3 SERPL-MCNC: 103 MG/DL (ref 50–180)
C4 SERPL-MCNC: 25 MG/DL (ref 11–44)
CREAT UR-MCNC: 92 MG/DL (ref 15–325)
PROT UR-MCNC: <7 MG/DL (ref 0–15)
PROT/CREAT UR: NORMAL MG/G{CREAT} (ref 0–0.2)

## 2020-12-07 ENCOUNTER — PATIENT MESSAGE (OUTPATIENT)
Dept: RHEUMATOLOGY | Facility: CLINIC | Age: 25
End: 2020-12-07

## 2020-12-07 LAB
CARDIOLIPIN IGG SER IA-ACNC: <9.4 GPL (ref 0–14.99)
CARDIOLIPIN IGM SER IA-ACNC: 10.8 MPL (ref 0–12.49)
CH50 SERPL-ACNC: 72 U/ML (ref 42–95)
PROT C AG ACT/NOR PPP IA: 108 % (ref 70–150)

## 2020-12-08 ENCOUNTER — PATIENT MESSAGE (OUTPATIENT)
Dept: RHEUMATOLOGY | Facility: CLINIC | Age: 25
End: 2020-12-08

## 2020-12-08 ENCOUNTER — PATIENT MESSAGE (OUTPATIENT)
Dept: ORTHOPEDICS | Facility: CLINIC | Age: 25
End: 2020-12-08

## 2020-12-08 LAB
B2 GLYCOPROT1 IGA SER QL: <9 SAU
B2 GLYCOPROT1 IGG SER QL: <9 SGU
B2 GLYCOPROT1 IGM SER QL: <9 SMU
HISTONE IGG SER IA-ACNC: 0.6 UNITS (ref 0–0.9)
LA PPP-IMP: NEGATIVE
PROT S FREE AG ACT/NOR PPP IA: 122 % (ref 50–147)

## 2020-12-10 ENCOUNTER — PATIENT MESSAGE (OUTPATIENT)
Dept: ORTHOPEDICS | Facility: CLINIC | Age: 25
End: 2020-12-10

## 2020-12-10 DIAGNOSIS — M87.052 AVASCULAR NECROSIS OF BONE OF LEFT HIP: ICD-10-CM

## 2020-12-10 DIAGNOSIS — M87.051 AVASCULAR NECROSIS OF BONE OF RIGHT HIP: Primary | ICD-10-CM

## 2020-12-11 ENCOUNTER — PATIENT MESSAGE (OUTPATIENT)
Dept: RHEUMATOLOGY | Facility: CLINIC | Age: 25
End: 2020-12-11

## 2020-12-11 LAB
F5 GENE MUT ANL BLD/T: NORMAL
F5 P.R506Q BLD/T QL: NORMAL

## 2020-12-17 ENCOUNTER — TELEPHONE (OUTPATIENT)
Dept: RHEUMATOLOGY | Facility: CLINIC | Age: 25
End: 2020-12-17

## 2020-12-18 ENCOUNTER — OFFICE VISIT (OUTPATIENT)
Dept: RHEUMATOLOGY | Facility: CLINIC | Age: 25
End: 2020-12-18
Payer: MEDICAID

## 2020-12-18 VITALS
SYSTOLIC BLOOD PRESSURE: 139 MMHG | HEIGHT: 67 IN | BODY MASS INDEX: 27.89 KG/M2 | DIASTOLIC BLOOD PRESSURE: 81 MMHG | WEIGHT: 177.69 LBS | HEART RATE: 86 BPM

## 2020-12-18 DIAGNOSIS — M87.052 AVASCULAR NECROSIS OF BONE OF LEFT HIP: ICD-10-CM

## 2020-12-18 DIAGNOSIS — M87.051 AVASCULAR NECROSIS OF BONE OF RIGHT HIP: Primary | ICD-10-CM

## 2020-12-18 DIAGNOSIS — Z51.81 MEDICATION MONITORING ENCOUNTER: ICD-10-CM

## 2020-12-18 PROCEDURE — 99214 PR OFFICE/OUTPT VISIT, EST, LEVL IV, 30-39 MIN: ICD-10-PCS | Mod: S$PBB,,, | Performed by: PHYSICIAN ASSISTANT

## 2020-12-18 PROCEDURE — 99213 OFFICE O/P EST LOW 20 MIN: CPT | Mod: PBBFAC | Performed by: PHYSICIAN ASSISTANT

## 2020-12-18 PROCEDURE — 99999 PR PBB SHADOW E&M-EST. PATIENT-LVL III: CPT | Mod: PBBFAC,,, | Performed by: PHYSICIAN ASSISTANT

## 2020-12-18 PROCEDURE — 99999 PR PBB SHADOW E&M-EST. PATIENT-LVL III: ICD-10-PCS | Mod: PBBFAC,,, | Performed by: PHYSICIAN ASSISTANT

## 2020-12-18 PROCEDURE — 99214 OFFICE O/P EST MOD 30 MIN: CPT | Mod: S$PBB,,, | Performed by: PHYSICIAN ASSISTANT

## 2020-12-18 RX ORDER — ALENDRONATE SODIUM 70 MG/1
70 TABLET ORAL
Qty: 12 TABLET | Refills: 1 | Status: SHIPPED | OUTPATIENT
Start: 2020-12-18 | End: 2021-07-01

## 2020-12-18 NOTE — PATIENT INSTRUCTIONS
Start alendronate 70mg once weekly first thing in am, full glass of water, stay upright and only water for 30 mins     3 months trial then see back may extend use       Alendronate tablets  What is this medicine?  ALENDRONATE (a MONICA droe gerardo) slows calcium loss from bones. It helps to make normal healthy bone and to slow bone loss in people with Paget's disease and osteoporosis. It may be used in others at risk for bone loss.  How should I use this medicine?  You must take this medicine exactly as directed or you will lower the amount of the medicine you absorb into your body or you may cause yourself harm. Take this medicine by mouth first thing in the morning, after you are up for the day. Do not eat or drink anything before you take your medicine. Swallow the tablet with a full glass (6 to 8 fluid ounces) of plain water. Do not take this medicine with any other drink. Do not chew or crush the tablet. After taking this medicine, do not eat breakfast, drink, or take any medicines or vitamins for at least 30 minutes. Sit or stand up for at least 30 minutes after you take this medicine; do not lie down. Do not take your medicine more often than directed.  Talk to your pediatrician regarding the use of this medicine in children. Special care may be needed.  What side effects may I notice from receiving this medicine?  Side effects that you should report to your doctor or health care professional as soon as possible:  · allergic reactions like skin rash, itching or hives, swelling of the face, lips, or tongue  · black or tarry stools  · bone, muscle or joint pain  · changes in vision  · chest pain  · heartburn or stomach pain  · jaw pain, especially after dental work  · pain or trouble when swallowing  · redness, blistering, peeling or loosening of the skin, including inside the mouth  Side effects that usually do not require medical attention (report to your doctor or health care professional if they continue or are  bothersome):  · changes in taste  · diarrhea or constipation  · eye pain or itching  · headache  · nausea or vomiting  · stomach gas or fullness  What may interact with this medicine?  · aluminum hydroxide  · antacids  · aspirin  · calcium supplements  · drugs for inflammation like ibuprofen, naproxen, and others  · iron supplements  · magnesium supplements  · vitamins with minerals  What if I miss a dose?  If you miss a dose, do not take it later in the day. Continue your normal schedule starting the next morning. Do not take double or extra doses.  Where should I keep my medicine?  Keep out of the reach of children.  Store at room temperature of 15 and 30 degrees C (59 and 86 degrees F). Throw away any unused medicine after the expiration date.  What should I tell my health care provider before I take this medicine?  They need to know if you have any of these conditions:  · dental disease  · esophagus, stomach, or intestine problems, like acid reflux or GERD  · kidney disease  · low blood calcium  · low vitamin D  · problems sitting or standing 30 minutes  · trouble swallowing  · an unusual or allergic reaction to alendronate, other medicines, foods, dyes, or preservatives  · pregnant or trying to get pregnant  · breast-feeding  What should I watch for while using this medicine?  Visit your doctor or health care professional for regular checks ups. It may be some time before you see benefit from this medicine. Do not stop taking your medicine except on your doctor's advice. Your doctor or health care professional may order blood tests and other tests to see how you are doing.  You should make sure you get enough calcium and vitamin D while you are taking this medicine, unless your doctor tells you not to. Discuss the foods you eat and the vitamins you take with your health care professional.  Some people who take this medicine have severe bone, joint, and/or muscle pain. This medicine may also increase your risk  for a broken thigh bone. Tell your doctor right away if you have pain in your upper leg or groin. Tell your doctor if you have any pain that does not go away or that gets worse.  This medicine can make you more sensitive to the sun. If you get a rash while taking this medicine, sunlight may cause the rash to get worse. Keep out of the sun. If you cannot avoid being in the sun, wear protective clothing and use sunscreen. Do not use sun lamps or tanning beds/booths.  NOTE:This sheet is a summary. It may not cover all possible information. If you have questions about this medicine, talk to your doctor, pharmacist, or health care provider. Copyright© 2017 [employer]

## 2020-12-18 NOTE — PROGRESS NOTES
Subjective:       Patient ID: Miriam Jett is a 25 y.o. female.    Chief Complaint: Follow-up    Miriam is a 26 y/o f referred by ortho last visit for evaluation. She has dx of avascular necrosis of the yahir hip.  She Reports a work injury, slipped on 8.8.20 and then started with R hip pain.  Imaging revealed AVN of the R hip and subsequently also seen in the L hip.  She denies any history of fracture.  She does not smoke, very occ alcohol.  She is not on oral contraceptive.  No h/o steroid use or current steroid use.  She has no history of dvt.      She does not have sickle cell trait per review of notes. Sig work up for autoimmune disease negative.  Routine hypercoag work up normal.     We are discussing new options today.  She is in sig pain stephen in her R hip, unable to ambulate             Past Medical History:   Diagnosis Date    Miscarriage      History reviewed. No pertinent surgical history.  Family History   Problem Relation Age of Onset    Birth defects Neg Hx      Social History     Socioeconomic History    Marital status: Single     Spouse name: Not on file    Number of children: Not on file    Years of education: Not on file    Highest education level: Not on file   Occupational History    Not on file   Social Needs    Financial resource strain: Not on file    Food insecurity     Worry: Not on file     Inability: Not on file    Transportation needs     Medical: Not on file     Non-medical: Not on file   Tobacco Use    Smoking status: Never Smoker    Smokeless tobacco: Never Used   Substance and Sexual Activity    Alcohol use: No    Drug use: No    Sexual activity: Yes     Partners: Male     Birth control/protection: None   Lifestyle    Physical activity     Days per week: Not on file     Minutes per session: Not on file    Stress: Not on file   Relationships    Social connections     Talks on phone: Not on file     Gets together: Not on file     Attends [employer] service:  "Not on file     Active member of club or organization: Not on file     Attends meetings of clubs or organizations: Not on file     Relationship status: Not on file   Other Topics Concern    Not on file   Social History Narrative    Not on file     Review of patient's allergies indicates:  No Known Allergies  Review of Systems   Constitutional: Negative for chills, fatigue and fever.   HENT: Negative for mouth sores, rhinorrhea and sore throat.    Eyes: Negative for pain and redness.   Respiratory: Negative for cough and shortness of breath.    Cardiovascular: Negative for chest pain.   Gastrointestinal: Negative for abdominal pain, constipation, diarrhea, nausea and vomiting.   Genitourinary: Negative for dysuria and hematuria.   Musculoskeletal: Positive for arthralgias and gait problem. Negative for joint swelling and myalgias.   Skin: Negative for rash.   Neurological: Negative for weakness, numbness and headaches.   Psychiatric/Behavioral: The patient is not nervous/anxious.          Objective:   /81   Pulse 86   Ht 5' 7" (1.702 m)   Wt 80.6 kg (177 lb 11.1 oz)   BMI 27.83 kg/m²   Immunization History   Administered Date(s) Administered    Influenza 11/10/2020              Physical Exam   Constitutional: She is oriented to person, place, and time and well-developed, well-nourished, and in no distress.   HENT:   Head: Normocephalic and atraumatic.   Eyes: Pupils are equal, round, and reactive to light. Right eye exhibits no discharge.   Neck: Normal range of motion.   Cardiovascular: Normal rate, regular rhythm and normal heart sounds.  Exam reveals no friction rub.    Pulmonary/Chest: Effort normal and breath sounds normal. No respiratory distress.   Abdominal: Soft. She exhibits no distension. There is no abdominal tenderness.   Lymphadenopathy:     She has no cervical adenopathy.   Neurological: She is alert and oriented to person, place, and time.   Skin: No rash noted. No erythema. " "    Psychiatric: Mood normal.   Musculoskeletal: Normal range of motion. No deformity or edema.      Comments: yahir wrists, mcps, pips no synvoitis, no tenderness, no warmth, good rom  yahir elbows shoulders no swelling or tenderness,full rom  yahir knees no effusion, no warmth, no tenderness, full rom    In wheelchair                  No results found for this or any previous visit (from the past 336 hour(s)).     No results found for: TBGOLDPLUS   No results found for: HAV, HEPAIGM, HEPBIGM, HEPBCAB, HBEAG, HEPCAB     Assessment:       1. Avascular necrosis of bone of right hip    2. Avascular necrosis of bone of left hip    3. Medication monitoring encounter          Impression:   Yahir fem head AVN:  R hip pain increased s/p fall at work, imaging found AVN to both hips, no obvious etiology at this time, do not suspect any issue with her bone density, no history of fx, no CTD,   Plan:         After discussion with several colleagues and research regarding bisphosphonate use in spontaneous AVN we have decided to move forward with trial of alendronate 70mg/week for 3 months with consideration of 6-9 mos therapy    studies support this use of off label treatment could delay progression of collapse and help with pain     New drug discussed with patient including SE, drug info printed and given to patient  Discussed importance of contraception during use of this drug       "Evidence for using alendronate to treat adult avascular necrosis of the femoral head: a systematic review"  Yaneli Romero 1, Diamante Hensley 2, Herman Vital 1, Ashley León 2, Martinez Keane 3   Most studies suggested a positive short-term efficacy of alendronate treatment in reducing pain, improving articular function, slowing of bone collapse progression, and delaying the need for MADHU for adult AVN patients.      "

## 2020-12-24 ENCOUNTER — PATIENT MESSAGE (OUTPATIENT)
Dept: RHEUMATOLOGY | Facility: CLINIC | Age: 25
End: 2020-12-24

## 2020-12-28 ENCOUNTER — PATIENT MESSAGE (OUTPATIENT)
Dept: RHEUMATOLOGY | Facility: CLINIC | Age: 25
End: 2020-12-28

## 2020-12-29 ENCOUNTER — PATIENT MESSAGE (OUTPATIENT)
Dept: RHEUMATOLOGY | Facility: CLINIC | Age: 25
End: 2020-12-29

## 2020-12-30 ENCOUNTER — PATIENT MESSAGE (OUTPATIENT)
Dept: RHEUMATOLOGY | Facility: CLINIC | Age: 25
End: 2020-12-30

## 2020-12-30 ENCOUNTER — TELEPHONE (OUTPATIENT)
Dept: ORTHOPEDICS | Facility: CLINIC | Age: 25
End: 2020-12-30

## 2020-12-31 ENCOUNTER — PATIENT MESSAGE (OUTPATIENT)
Dept: RHEUMATOLOGY | Facility: CLINIC | Age: 25
End: 2020-12-31

## 2021-01-11 ENCOUNTER — PATIENT MESSAGE (OUTPATIENT)
Dept: RHEUMATOLOGY | Facility: CLINIC | Age: 26
End: 2021-01-11

## 2021-01-14 ENCOUNTER — PATIENT MESSAGE (OUTPATIENT)
Dept: RHEUMATOLOGY | Facility: CLINIC | Age: 26
End: 2021-01-14

## 2021-01-14 ENCOUNTER — TELEPHONE (OUTPATIENT)
Dept: RHEUMATOLOGY | Facility: CLINIC | Age: 26
End: 2021-01-14

## 2021-01-15 ENCOUNTER — HOSPITAL ENCOUNTER (OUTPATIENT)
Dept: RADIOLOGY | Facility: HOSPITAL | Age: 26
Discharge: HOME | End: 2021-01-15
Attending: PHYSICIAN ASSISTANT
Payer: MEDICAID

## 2021-01-15 ENCOUNTER — OFFICE VISIT (OUTPATIENT)
Dept: RHEUMATOLOGY | Facility: CLINIC | Age: 26
End: 2021-01-15
Payer: MEDICAID

## 2021-01-15 VITALS
BODY MASS INDEX: 26.89 KG/M2 | SYSTOLIC BLOOD PRESSURE: 128 MMHG | HEART RATE: 90 BPM | DIASTOLIC BLOOD PRESSURE: 80 MMHG | WEIGHT: 171.31 LBS | HEIGHT: 67 IN

## 2021-01-15 DIAGNOSIS — M25.561 ACUTE PAIN OF RIGHT KNEE: ICD-10-CM

## 2021-01-15 DIAGNOSIS — M25.571 ACUTE RIGHT ANKLE PAIN: ICD-10-CM

## 2021-01-15 DIAGNOSIS — M25.561 ACUTE PAIN OF RIGHT KNEE: Primary | ICD-10-CM

## 2021-01-15 DIAGNOSIS — M25.551 RIGHT HIP PAIN: ICD-10-CM

## 2021-01-15 DIAGNOSIS — M87.051 AVASCULAR NECROSIS OF BONE OF RIGHT HIP: ICD-10-CM

## 2021-01-15 PROCEDURE — 73560 X-RAY EXAM OF KNEE 1 OR 2: CPT | Mod: TC,LT,59

## 2021-01-15 PROCEDURE — 99214 OFFICE O/P EST MOD 30 MIN: CPT | Mod: PBBFAC,25 | Performed by: PHYSICIAN ASSISTANT

## 2021-01-15 PROCEDURE — 73560 X-RAY EXAM OF KNEE 1 OR 2: CPT | Mod: 26,LT,, | Performed by: RADIOLOGY

## 2021-01-15 PROCEDURE — 99214 OFFICE O/P EST MOD 30 MIN: CPT | Mod: S$PBB,,, | Performed by: PHYSICIAN ASSISTANT

## 2021-01-15 PROCEDURE — 73562 XR KNEE ORTHO RIGHT: ICD-10-PCS | Mod: 26,RT,, | Performed by: RADIOLOGY

## 2021-01-15 PROCEDURE — 99214 PR OFFICE/OUTPT VISIT, EST, LEVL IV, 30-39 MIN: ICD-10-PCS | Mod: S$PBB,,, | Performed by: PHYSICIAN ASSISTANT

## 2021-01-15 PROCEDURE — 73562 X-RAY EXAM OF KNEE 3: CPT | Mod: 26,RT,, | Performed by: RADIOLOGY

## 2021-01-15 PROCEDURE — 73560 XR KNEE ORTHO RIGHT: ICD-10-PCS | Mod: 26,LT,, | Performed by: RADIOLOGY

## 2021-01-15 PROCEDURE — 99999 PR PBB SHADOW E&M-EST. PATIENT-LVL IV: ICD-10-PCS | Mod: PBBFAC,,, | Performed by: PHYSICIAN ASSISTANT

## 2021-01-15 PROCEDURE — 99999 PR PBB SHADOW E&M-EST. PATIENT-LVL IV: CPT | Mod: PBBFAC,,, | Performed by: PHYSICIAN ASSISTANT

## 2021-01-15 RX ORDER — MELOXICAM 15 MG/1
15 TABLET ORAL DAILY
Qty: 30 TABLET | Refills: 2 | Status: SHIPPED | OUTPATIENT
Start: 2021-01-15 | End: 2021-07-01

## 2021-01-17 ENCOUNTER — PATIENT MESSAGE (OUTPATIENT)
Dept: RHEUMATOLOGY | Facility: CLINIC | Age: 26
End: 2021-01-17

## 2021-01-19 ENCOUNTER — PATIENT MESSAGE (OUTPATIENT)
Dept: RHEUMATOLOGY | Facility: CLINIC | Age: 26
End: 2021-01-19

## 2021-01-22 ENCOUNTER — CLINICAL SUPPORT (OUTPATIENT)
Dept: REHABILITATION | Facility: HOSPITAL | Age: 26
End: 2021-01-22
Payer: MEDICAID

## 2021-01-22 DIAGNOSIS — R29.898 DECREASED STRENGTH OF LOWER EXTREMITY: ICD-10-CM

## 2021-01-22 PROCEDURE — 97110 THERAPEUTIC EXERCISES: CPT

## 2021-01-22 PROCEDURE — 97161 PT EVAL LOW COMPLEX 20 MIN: CPT

## 2021-01-25 ENCOUNTER — CLINICAL SUPPORT (OUTPATIENT)
Dept: REHABILITATION | Facility: HOSPITAL | Age: 26
End: 2021-01-25
Payer: MEDICAID

## 2021-01-25 DIAGNOSIS — R29.898 DECREASED STRENGTH OF LOWER EXTREMITY: ICD-10-CM

## 2021-01-25 PROCEDURE — 97110 THERAPEUTIC EXERCISES: CPT

## 2021-01-29 ENCOUNTER — DOCUMENTATION ONLY (OUTPATIENT)
Dept: REHABILITATION | Facility: HOSPITAL | Age: 26
End: 2021-01-29

## 2021-02-02 ENCOUNTER — CLINICAL SUPPORT (OUTPATIENT)
Dept: REHABILITATION | Facility: HOSPITAL | Age: 26
End: 2021-02-02
Payer: MEDICAID

## 2021-02-02 DIAGNOSIS — R29.898 DECREASED STRENGTH OF LOWER EXTREMITY: ICD-10-CM

## 2021-02-02 PROCEDURE — 97140 MANUAL THERAPY 1/> REGIONS: CPT

## 2021-02-02 PROCEDURE — 97110 THERAPEUTIC EXERCISES: CPT

## 2021-02-05 ENCOUNTER — CLINICAL SUPPORT (OUTPATIENT)
Dept: REHABILITATION | Facility: HOSPITAL | Age: 26
End: 2021-02-05
Payer: MEDICAID

## 2021-02-05 DIAGNOSIS — R29.898 DECREASED STRENGTH OF LOWER EXTREMITY: ICD-10-CM

## 2021-02-05 PROCEDURE — 97110 THERAPEUTIC EXERCISES: CPT

## 2021-02-11 ENCOUNTER — DOCUMENTATION ONLY (OUTPATIENT)
Dept: REHABILITATION | Facility: HOSPITAL | Age: 26
End: 2021-02-11

## 2021-02-18 ENCOUNTER — DOCUMENTATION ONLY (OUTPATIENT)
Dept: REHABILITATION | Facility: HOSPITAL | Age: 26
End: 2021-02-18

## 2021-02-25 ENCOUNTER — PATIENT MESSAGE (OUTPATIENT)
Dept: RHEUMATOLOGY | Facility: CLINIC | Age: 26
End: 2021-02-25

## 2021-03-07 ENCOUNTER — PATIENT MESSAGE (OUTPATIENT)
Dept: RHEUMATOLOGY | Facility: CLINIC | Age: 26
End: 2021-03-07

## 2021-03-08 ENCOUNTER — TELEPHONE (OUTPATIENT)
Dept: RHEUMATOLOGY | Facility: CLINIC | Age: 26
End: 2021-03-08

## 2021-03-08 ENCOUNTER — PATIENT MESSAGE (OUTPATIENT)
Dept: RHEUMATOLOGY | Facility: CLINIC | Age: 26
End: 2021-03-08

## 2021-03-17 ENCOUNTER — DOCUMENTATION ONLY (OUTPATIENT)
Dept: REHABILITATION | Facility: HOSPITAL | Age: 26
End: 2021-03-17

## 2021-03-18 ENCOUNTER — TELEPHONE (OUTPATIENT)
Dept: RHEUMATOLOGY | Facility: CLINIC | Age: 26
End: 2021-03-18

## 2021-03-19 ENCOUNTER — OFFICE VISIT (OUTPATIENT)
Dept: RHEUMATOLOGY | Facility: CLINIC | Age: 26
End: 2021-03-19
Payer: MEDICAID

## 2021-03-19 ENCOUNTER — LAB VISIT (OUTPATIENT)
Dept: LAB | Facility: HOSPITAL | Age: 26
End: 2021-03-19
Attending: PHYSICIAN ASSISTANT
Payer: MEDICAID

## 2021-03-19 ENCOUNTER — TELEPHONE (OUTPATIENT)
Dept: ORTHOPEDICS | Facility: CLINIC | Age: 26
End: 2021-03-19

## 2021-03-19 VITALS
DIASTOLIC BLOOD PRESSURE: 79 MMHG | HEIGHT: 67 IN | HEART RATE: 125 BPM | WEIGHT: 178.13 LBS | SYSTOLIC BLOOD PRESSURE: 120 MMHG | BODY MASS INDEX: 27.96 KG/M2

## 2021-03-19 DIAGNOSIS — Z51.81 MEDICATION MONITORING ENCOUNTER: ICD-10-CM

## 2021-03-19 DIAGNOSIS — M25.561 ACUTE PAIN OF RIGHT KNEE: Primary | ICD-10-CM

## 2021-03-19 DIAGNOSIS — M87.051 AVASCULAR NECROSIS OF BONE OF RIGHT HIP: ICD-10-CM

## 2021-03-19 DIAGNOSIS — M87.052 AVASCULAR NECROSIS OF BONE OF LEFT HIP: ICD-10-CM

## 2021-03-19 DIAGNOSIS — M25.551 RIGHT HIP PAIN: ICD-10-CM

## 2021-03-19 LAB
ALBUMIN SERPL BCP-MCNC: 4.1 G/DL (ref 3.5–5.2)
ALP SERPL-CCNC: 50 U/L (ref 55–135)
ALT SERPL W/O P-5'-P-CCNC: 19 U/L (ref 10–44)
ANION GAP SERPL CALC-SCNC: 8 MMOL/L (ref 8–16)
AST SERPL-CCNC: 14 U/L (ref 10–40)
BILIRUB SERPL-MCNC: 0.3 MG/DL (ref 0.1–1)
BUN SERPL-MCNC: 13 MG/DL (ref 6–20)
CALCIUM SERPL-MCNC: 9.4 MG/DL (ref 8.7–10.5)
CHLORIDE SERPL-SCNC: 107 MMOL/L (ref 95–110)
CO2 SERPL-SCNC: 26 MMOL/L (ref 23–29)
CREAT SERPL-MCNC: 0.8 MG/DL (ref 0.5–1.4)
EST. GFR  (AFRICAN AMERICAN): >60 ML/MIN/1.73 M^2
EST. GFR  (NON AFRICAN AMERICAN): >60 ML/MIN/1.73 M^2
GLUCOSE SERPL-MCNC: 92 MG/DL (ref 70–110)
POTASSIUM SERPL-SCNC: 4.1 MMOL/L (ref 3.5–5.1)
PROT SERPL-MCNC: 7.7 G/DL (ref 6–8.4)
SODIUM SERPL-SCNC: 141 MMOL/L (ref 136–145)

## 2021-03-19 PROCEDURE — 99214 OFFICE O/P EST MOD 30 MIN: CPT | Mod: S$PBB,,, | Performed by: PHYSICIAN ASSISTANT

## 2021-03-19 PROCEDURE — 80053 COMPREHEN METABOLIC PANEL: CPT | Performed by: PHYSICIAN ASSISTANT

## 2021-03-19 PROCEDURE — 99214 PR OFFICE/OUTPT VISIT, EST, LEVL IV, 30-39 MIN: ICD-10-PCS | Mod: S$PBB,,, | Performed by: PHYSICIAN ASSISTANT

## 2021-03-19 PROCEDURE — 36415 COLL VENOUS BLD VENIPUNCTURE: CPT | Performed by: PHYSICIAN ASSISTANT

## 2021-03-19 PROCEDURE — 99214 OFFICE O/P EST MOD 30 MIN: CPT | Mod: PBBFAC | Performed by: PHYSICIAN ASSISTANT

## 2021-03-19 PROCEDURE — 99999 PR PBB SHADOW E&M-EST. PATIENT-LVL IV: CPT | Mod: PBBFAC,,, | Performed by: PHYSICIAN ASSISTANT

## 2021-03-19 PROCEDURE — 99999 PR PBB SHADOW E&M-EST. PATIENT-LVL IV: ICD-10-PCS | Mod: PBBFAC,,, | Performed by: PHYSICIAN ASSISTANT

## 2021-04-29 ENCOUNTER — PATIENT MESSAGE (OUTPATIENT)
Dept: RESEARCH | Facility: HOSPITAL | Age: 26
End: 2021-04-29

## 2021-05-01 ENCOUNTER — PATIENT MESSAGE (OUTPATIENT)
Dept: RHEUMATOLOGY | Facility: CLINIC | Age: 26
End: 2021-05-01

## 2021-05-03 ENCOUNTER — PATIENT MESSAGE (OUTPATIENT)
Dept: RHEUMATOLOGY | Facility: CLINIC | Age: 26
End: 2021-05-03

## 2021-05-12 ENCOUNTER — TELEPHONE (OUTPATIENT)
Dept: ORTHOPEDICS | Facility: CLINIC | Age: 26
End: 2021-05-12

## 2021-05-20 RX ORDER — ALENDRONATE SODIUM 70 MG/1
TABLET ORAL
Qty: 12 TABLET | Refills: 1 | OUTPATIENT
Start: 2021-05-20

## 2021-06-21 ENCOUNTER — OFFICE VISIT (OUTPATIENT)
Dept: ORTHOPEDICS | Facility: CLINIC | Age: 26
End: 2021-06-21
Payer: MEDICAID

## 2021-06-21 VITALS
SYSTOLIC BLOOD PRESSURE: 137 MMHG | HEIGHT: 67 IN | DIASTOLIC BLOOD PRESSURE: 88 MMHG | BODY MASS INDEX: 27.94 KG/M2 | WEIGHT: 178 LBS | HEART RATE: 96 BPM

## 2021-06-21 DIAGNOSIS — M87.052 AVASCULAR NECROSIS OF BONE OF LEFT HIP: ICD-10-CM

## 2021-06-21 DIAGNOSIS — M16.11 ARTHRITIS OF RIGHT HIP: ICD-10-CM

## 2021-06-21 DIAGNOSIS — M87.052 AVASCULAR NECROSIS OF BONE OF LEFT HIP: Primary | ICD-10-CM

## 2021-06-21 DIAGNOSIS — M87.051 AVASCULAR NECROSIS OF BONE OF RIGHT HIP: Primary | ICD-10-CM

## 2021-06-21 DIAGNOSIS — Z01.818 PREOP TESTING: Primary | ICD-10-CM

## 2021-06-21 PROCEDURE — 99213 OFFICE O/P EST LOW 20 MIN: CPT | Mod: PBBFAC | Performed by: ORTHOPAEDIC SURGERY

## 2021-06-21 PROCEDURE — 99999 PR PBB SHADOW E&M-EST. PATIENT-LVL III: ICD-10-PCS | Mod: PBBFAC,,, | Performed by: ORTHOPAEDIC SURGERY

## 2021-06-21 PROCEDURE — 99999 PR PBB SHADOW E&M-EST. PATIENT-LVL III: CPT | Mod: PBBFAC,,, | Performed by: ORTHOPAEDIC SURGERY

## 2021-06-21 PROCEDURE — 99214 OFFICE O/P EST MOD 30 MIN: CPT | Mod: S$PBB,,, | Performed by: ORTHOPAEDIC SURGERY

## 2021-06-21 PROCEDURE — 99214 PR OFFICE/OUTPT VISIT, EST, LEVL IV, 30-39 MIN: ICD-10-PCS | Mod: S$PBB,,, | Performed by: ORTHOPAEDIC SURGERY

## 2021-06-21 RX ORDER — GABAPENTIN 300 MG/1
300 CAPSULE ORAL NIGHTLY
Qty: 90 CAPSULE | Refills: 3 | Status: SHIPPED | OUTPATIENT
Start: 2021-06-21 | End: 2021-07-27

## 2021-06-25 ENCOUNTER — TELEPHONE (OUTPATIENT)
Dept: PAIN MEDICINE | Facility: CLINIC | Age: 26
End: 2021-06-25

## 2021-06-25 DIAGNOSIS — M25.551 RIGHT HIP PAIN: Primary | ICD-10-CM

## 2021-07-01 ENCOUNTER — TELEPHONE (OUTPATIENT)
Dept: PAIN MEDICINE | Facility: CLINIC | Age: 26
End: 2021-07-01

## 2021-07-06 ENCOUNTER — PATIENT MESSAGE (OUTPATIENT)
Dept: PAIN MEDICINE | Facility: CLINIC | Age: 26
End: 2021-07-06

## 2021-07-10 ENCOUNTER — LAB VISIT (OUTPATIENT)
Dept: OTOLARYNGOLOGY | Facility: CLINIC | Age: 26
End: 2021-07-10
Payer: MEDICAID

## 2021-07-10 ENCOUNTER — PATIENT MESSAGE (OUTPATIENT)
Dept: PAIN MEDICINE | Facility: HOSPITAL | Age: 26
End: 2021-07-10

## 2021-07-10 DIAGNOSIS — Z01.818 PREOP TESTING: ICD-10-CM

## 2021-07-10 PROCEDURE — U0003 INFECTIOUS AGENT DETECTION BY NUCLEIC ACID (DNA OR RNA); SEVERE ACUTE RESPIRATORY SYNDROME CORONAVIRUS 2 (SARS-COV-2) (CORONAVIRUS DISEASE [COVID-19]), AMPLIFIED PROBE TECHNIQUE, MAKING USE OF HIGH THROUGHPUT TECHNOLOGIES AS DESCRIBED BY CMS-2020-01-R: HCPCS | Performed by: ORTHOPAEDIC SURGERY

## 2021-07-10 PROCEDURE — U0005 INFEC AGEN DETEC AMPLI PROBE: HCPCS | Performed by: ORTHOPAEDIC SURGERY

## 2021-07-12 ENCOUNTER — PATIENT MESSAGE (OUTPATIENT)
Dept: SURGERY | Facility: HOSPITAL | Age: 26
End: 2021-07-12

## 2021-07-12 ENCOUNTER — PATIENT MESSAGE (OUTPATIENT)
Dept: PAIN MEDICINE | Facility: HOSPITAL | Age: 26
End: 2021-07-12

## 2021-07-12 ENCOUNTER — HOSPITAL ENCOUNTER (OUTPATIENT)
Facility: HOSPITAL | Age: 26
Discharge: HOME | End: 2021-07-12
Attending: ANESTHESIOLOGY | Admitting: ANESTHESIOLOGY
Payer: MEDICAID

## 2021-07-12 VITALS
SYSTOLIC BLOOD PRESSURE: 127 MMHG | HEIGHT: 67 IN | RESPIRATION RATE: 17 BRPM | BODY MASS INDEX: 26.09 KG/M2 | DIASTOLIC BLOOD PRESSURE: 77 MMHG | TEMPERATURE: 98 F | OXYGEN SATURATION: 100 % | WEIGHT: 166.25 LBS | HEART RATE: 95 BPM

## 2021-07-12 DIAGNOSIS — M25.559 HIP PAIN: Primary | ICD-10-CM

## 2021-07-12 LAB
B-HCG UR QL: NEGATIVE
CTP QC/QA: YES
SARS-COV-2 RNA RESP QL NAA+PROBE: NOT DETECTED
SARS-COV-2- CYCLE NUMBER: -1

## 2021-07-12 PROCEDURE — 81025 URINE PREGNANCY TEST: CPT | Performed by: ANESTHESIOLOGY

## 2021-07-12 PROCEDURE — 25500020 PHARM REV CODE 255: Performed by: ANESTHESIOLOGY

## 2021-07-12 PROCEDURE — 63600175 PHARM REV CODE 636 W HCPCS: Performed by: ANESTHESIOLOGY

## 2021-07-12 PROCEDURE — 20610 DRAIN/INJ JOINT/BURSA W/O US: CPT | Mod: RT,,, | Performed by: ANESTHESIOLOGY

## 2021-07-12 PROCEDURE — 20610 DRAIN/INJ JOINT/BURSA W/O US: CPT | Performed by: ANESTHESIOLOGY

## 2021-07-12 PROCEDURE — 77002 PR FLUOROSCOPIC GUIDANCE NEEDLE PLACEMENT: ICD-10-PCS | Mod: 26,,, | Performed by: ANESTHESIOLOGY

## 2021-07-12 PROCEDURE — 77002 NEEDLE LOCALIZATION BY XRAY: CPT | Mod: 26,,, | Performed by: ANESTHESIOLOGY

## 2021-07-12 PROCEDURE — 20610 PR DRAIN/INJECT LARGE JOINT/BURSA: ICD-10-PCS | Mod: RT,,, | Performed by: ANESTHESIOLOGY

## 2021-07-12 PROCEDURE — 25000003 PHARM REV CODE 250: Performed by: ANESTHESIOLOGY

## 2021-07-12 RX ORDER — INDOMETHACIN 25 MG/1
CAPSULE ORAL
Status: DISCONTINUED | OUTPATIENT
Start: 2021-07-12 | End: 2021-07-12 | Stop reason: HOSPADM

## 2021-07-12 RX ORDER — METHYLPREDNISOLONE ACETATE 40 MG/ML
INJECTION, SUSPENSION INTRA-ARTICULAR; INTRALESIONAL; INTRAMUSCULAR; SOFT TISSUE
Status: DISCONTINUED | OUTPATIENT
Start: 2021-07-12 | End: 2021-07-12 | Stop reason: HOSPADM

## 2021-07-12 RX ORDER — LIDOCAINE HYDROCHLORIDE 20 MG/ML
INJECTION, SOLUTION EPIDURAL; INFILTRATION; INTRACAUDAL; PERINEURAL
Status: DISCONTINUED | OUTPATIENT
Start: 2021-07-12 | End: 2021-07-12 | Stop reason: HOSPADM

## 2021-07-12 RX ORDER — FENTANYL CITRATE 50 UG/ML
INJECTION, SOLUTION INTRAMUSCULAR; INTRAVENOUS
Status: DISCONTINUED | OUTPATIENT
Start: 2021-07-12 | End: 2021-07-12 | Stop reason: HOSPADM

## 2021-07-12 RX ORDER — MIDAZOLAM HYDROCHLORIDE 1 MG/ML
INJECTION, SOLUTION INTRAMUSCULAR; INTRAVENOUS
Status: DISCONTINUED | OUTPATIENT
Start: 2021-07-12 | End: 2021-07-12 | Stop reason: HOSPADM

## 2021-07-13 ENCOUNTER — ANESTHESIA EVENT (OUTPATIENT)
Dept: SURGERY | Facility: HOSPITAL | Age: 26
End: 2021-07-13
Payer: MEDICAID

## 2021-07-13 ENCOUNTER — HOSPITAL ENCOUNTER (OUTPATIENT)
Facility: HOSPITAL | Age: 26
Discharge: HOME | End: 2021-07-13
Attending: ORTHOPAEDIC SURGERY | Admitting: ORTHOPAEDIC SURGERY
Payer: MEDICAID

## 2021-07-13 ENCOUNTER — ANESTHESIA (OUTPATIENT)
Dept: SURGERY | Facility: HOSPITAL | Age: 26
End: 2021-07-13
Payer: MEDICAID

## 2021-07-13 DIAGNOSIS — M87.052 AVASCULAR NECROSIS OF BONE OF LEFT HIP: ICD-10-CM

## 2021-07-13 DIAGNOSIS — M87.052 AVASCULAR NECROSIS OF BONE OF HIP, LEFT: Primary | ICD-10-CM

## 2021-07-13 LAB
B-HCG UR QL: NEGATIVE
CTP QC/QA: YES

## 2021-07-13 PROCEDURE — 36000711: Performed by: ORTHOPAEDIC SURGERY

## 2021-07-13 PROCEDURE — 36000710: Performed by: ORTHOPAEDIC SURGERY

## 2021-07-13 PROCEDURE — 27201423 OPTIME MED/SURG SUP & DEVICES STERILE SUPPLY: Performed by: ORTHOPAEDIC SURGERY

## 2021-07-13 PROCEDURE — 63600175 PHARM REV CODE 636 W HCPCS: Performed by: ANESTHESIOLOGY

## 2021-07-13 PROCEDURE — 27299 UNLISTED PX PELVIS/HIP JOINT: CPT | Mod: LT,,, | Performed by: ORTHOPAEDIC SURGERY

## 2021-07-13 PROCEDURE — 01210 ANES OPEN PX HIP JOINT NOS: CPT | Performed by: ORTHOPAEDIC SURGERY

## 2021-07-13 PROCEDURE — 37000009 HC ANESTHESIA EA ADD 15 MINS: Performed by: ORTHOPAEDIC SURGERY

## 2021-07-13 PROCEDURE — 25000003 PHARM REV CODE 250

## 2021-07-13 PROCEDURE — C1713 ANCHOR/SCREW BN/BN,TIS/BN: HCPCS | Performed by: ORTHOPAEDIC SURGERY

## 2021-07-13 PROCEDURE — 71000033 HC RECOVERY, INTIAL HOUR: Performed by: ORTHOPAEDIC SURGERY

## 2021-07-13 PROCEDURE — 63600175 PHARM REV CODE 636 W HCPCS

## 2021-07-13 PROCEDURE — 27299 PR HIP CORE DECOMPRESSION: ICD-10-PCS | Mod: LT,,, | Performed by: ORTHOPAEDIC SURGERY

## 2021-07-13 PROCEDURE — 81025 URINE PREGNANCY TEST: CPT | Performed by: ORTHOPAEDIC SURGERY

## 2021-07-13 PROCEDURE — 63600175 PHARM REV CODE 636 W HCPCS: Performed by: ORTHOPAEDIC SURGERY

## 2021-07-13 PROCEDURE — 71000016 HC POSTOP RECOV ADDL HR: Performed by: ORTHOPAEDIC SURGERY

## 2021-07-13 PROCEDURE — 37000008 HC ANESTHESIA 1ST 15 MINUTES: Performed by: ORTHOPAEDIC SURGERY

## 2021-07-13 PROCEDURE — 25000003 PHARM REV CODE 250: Performed by: ORTHOPAEDIC SURGERY

## 2021-07-13 PROCEDURE — 71000015 HC POSTOP RECOV 1ST HR: Performed by: ORTHOPAEDIC SURGERY

## 2021-07-13 DEVICE — IMPLANTABLE DEVICE: Type: IMPLANTABLE DEVICE | Site: HIP | Status: FUNCTIONAL

## 2021-07-13 RX ORDER — MEPERIDINE HYDROCHLORIDE 25 MG/ML
12.5 INJECTION INTRAMUSCULAR; INTRAVENOUS; SUBCUTANEOUS ONCE AS NEEDED
Status: DISCONTINUED | OUTPATIENT
Start: 2021-07-13 | End: 2021-07-13 | Stop reason: HOSPADM

## 2021-07-13 RX ORDER — ONDANSETRON 4 MG/1
8 TABLET, ORALLY DISINTEGRATING ORAL EVERY 6 HOURS PRN
Qty: 20 TABLET | Refills: 0 | Status: SHIPPED | OUTPATIENT
Start: 2021-07-13 | End: 2022-07-01

## 2021-07-13 RX ORDER — HYDROCODONE BITARTRATE AND ACETAMINOPHEN 10; 325 MG/1; MG/1
1 TABLET ORAL EVERY 4 HOURS PRN
Status: DISCONTINUED | OUTPATIENT
Start: 2021-07-13 | End: 2021-07-13 | Stop reason: HOSPADM

## 2021-07-13 RX ORDER — SODIUM CHLORIDE 9 MG/ML
INJECTION, SOLUTION INTRAVENOUS CONTINUOUS
Status: DISCONTINUED | OUTPATIENT
Start: 2021-07-13 | End: 2021-07-13 | Stop reason: HOSPADM

## 2021-07-13 RX ORDER — FENTANYL CITRATE 50 UG/ML
INJECTION, SOLUTION INTRAMUSCULAR; INTRAVENOUS
Status: DISCONTINUED | OUTPATIENT
Start: 2021-07-13 | End: 2021-07-13

## 2021-07-13 RX ORDER — HYDROMORPHONE HYDROCHLORIDE 2 MG/ML
0.2 INJECTION, SOLUTION INTRAMUSCULAR; INTRAVENOUS; SUBCUTANEOUS EVERY 5 MIN PRN
Status: DISCONTINUED | OUTPATIENT
Start: 2021-07-13 | End: 2021-07-13 | Stop reason: HOSPADM

## 2021-07-13 RX ORDER — CEFAZOLIN SODIUM 2 G/50ML
2 SOLUTION INTRAVENOUS
Status: COMPLETED | OUTPATIENT
Start: 2021-07-13 | End: 2021-07-13

## 2021-07-13 RX ORDER — HYDROCODONE BITARTRATE AND ACETAMINOPHEN 5; 325 MG/1; MG/1
1 TABLET ORAL EVERY 4 HOURS PRN
Status: DISCONTINUED | OUTPATIENT
Start: 2021-07-13 | End: 2021-07-13 | Stop reason: HOSPADM

## 2021-07-13 RX ORDER — OXYCODONE AND ACETAMINOPHEN 10; 325 MG/1; MG/1
1 TABLET ORAL EVERY 6 HOURS PRN
Qty: 28 TABLET | Refills: 0 | Status: SHIPPED | OUTPATIENT
Start: 2021-07-13 | End: 2021-08-04 | Stop reason: SDUPTHER

## 2021-07-13 RX ORDER — ROPIVACAINE/EPI/CLONIDINE/KET 2.46-0.005
SYRINGE (ML) INJECTION
Status: DISCONTINUED | OUTPATIENT
Start: 2021-07-13 | End: 2021-07-13 | Stop reason: HOSPADM

## 2021-07-13 RX ORDER — ONDANSETRON 2 MG/ML
4 INJECTION INTRAMUSCULAR; INTRAVENOUS EVERY 12 HOURS PRN
Status: DISCONTINUED | OUTPATIENT
Start: 2021-07-13 | End: 2021-07-13 | Stop reason: HOSPADM

## 2021-07-13 RX ORDER — METOCLOPRAMIDE HYDROCHLORIDE 5 MG/ML
10 INJECTION INTRAMUSCULAR; INTRAVENOUS EVERY 10 MIN PRN
Status: DISCONTINUED | OUTPATIENT
Start: 2021-07-13 | End: 2021-07-13 | Stop reason: HOSPADM

## 2021-07-13 RX ORDER — DEXAMETHASONE SODIUM PHOSPHATE 4 MG/ML
INJECTION, SOLUTION INTRA-ARTICULAR; INTRALESIONAL; INTRAMUSCULAR; INTRAVENOUS; SOFT TISSUE
Status: DISCONTINUED | OUTPATIENT
Start: 2021-07-13 | End: 2021-07-13

## 2021-07-13 RX ORDER — ONDANSETRON 2 MG/ML
INJECTION INTRAMUSCULAR; INTRAVENOUS
Status: DISCONTINUED | OUTPATIENT
Start: 2021-07-13 | End: 2021-07-13

## 2021-07-13 RX ORDER — MIDAZOLAM HYDROCHLORIDE 1 MG/ML
INJECTION, SOLUTION INTRAMUSCULAR; INTRAVENOUS
Status: DISCONTINUED | OUTPATIENT
Start: 2021-07-13 | End: 2021-07-13

## 2021-07-13 RX ORDER — ROCURONIUM BROMIDE 10 MG/ML
INJECTION, SOLUTION INTRAVENOUS
Status: DISCONTINUED | OUTPATIENT
Start: 2021-07-13 | End: 2021-07-13

## 2021-07-13 RX ORDER — PROPOFOL 10 MG/ML
VIAL (ML) INTRAVENOUS
Status: DISCONTINUED | OUTPATIENT
Start: 2021-07-13 | End: 2021-07-13

## 2021-07-13 RX ORDER — ONDANSETRON 8 MG/1
8 TABLET, ORALLY DISINTEGRATING ORAL EVERY 8 HOURS PRN
Status: DISCONTINUED | OUTPATIENT
Start: 2021-07-13 | End: 2021-07-13 | Stop reason: HOSPADM

## 2021-07-13 RX ORDER — LIDOCAINE HYDROCHLORIDE 10 MG/ML
INJECTION, SOLUTION EPIDURAL; INFILTRATION; INTRACAUDAL; PERINEURAL
Status: DISCONTINUED | OUTPATIENT
Start: 2021-07-13 | End: 2021-07-13

## 2021-07-13 RX ORDER — DIPHENHYDRAMINE HYDROCHLORIDE 50 MG/ML
25 INJECTION INTRAMUSCULAR; INTRAVENOUS EVERY 6 HOURS PRN
Status: DISCONTINUED | OUTPATIENT
Start: 2021-07-13 | End: 2021-07-13 | Stop reason: HOSPADM

## 2021-07-13 RX ORDER — CHLORHEXIDINE GLUCONATE ORAL RINSE 1.2 MG/ML
10 SOLUTION DENTAL
Status: DISCONTINUED | OUTPATIENT
Start: 2021-07-13 | End: 2021-07-13 | Stop reason: HOSPADM

## 2021-07-13 RX ORDER — SODIUM CHLORIDE 0.9 % (FLUSH) 0.9 %
3 SYRINGE (ML) INJECTION EVERY 8 HOURS
Status: DISCONTINUED | OUTPATIENT
Start: 2021-07-13 | End: 2021-07-13 | Stop reason: HOSPADM

## 2021-07-13 RX ADMIN — HYDROMORPHONE HYDROCHLORIDE 0.2 MG: 2 INJECTION INTRAMUSCULAR; INTRAVENOUS; SUBCUTANEOUS at 02:07

## 2021-07-13 RX ADMIN — LIDOCAINE HYDROCHLORIDE 50 MG: 10 INJECTION, SOLUTION EPIDURAL; INFILTRATION; INTRACAUDAL; PERINEURAL at 01:07

## 2021-07-13 RX ADMIN — FENTANYL CITRATE 50 MCG: 50 INJECTION, SOLUTION INTRAMUSCULAR; INTRAVENOUS at 02:07

## 2021-07-13 RX ADMIN — DEXAMETHASONE SODIUM PHOSPHATE 8 MG: 4 INJECTION, SOLUTION INTRAMUSCULAR; INTRAVENOUS at 01:07

## 2021-07-13 RX ADMIN — SODIUM CHLORIDE, POTASSIUM CHLORIDE, SODIUM LACTATE AND CALCIUM CHLORIDE: 600; 310; 30; 20 INJECTION, SOLUTION INTRAVENOUS at 01:07

## 2021-07-13 RX ADMIN — 0.12% CHLORHEXIDINE GLUCONATE 10 ML: 1.2 RINSE ORAL at 11:07

## 2021-07-13 RX ADMIN — SODIUM CHLORIDE, POTASSIUM CHLORIDE, SODIUM LACTATE AND CALCIUM CHLORIDE: 600; 310; 30; 20 INJECTION, SOLUTION INTRAVENOUS at 12:07

## 2021-07-13 RX ADMIN — CEFAZOLIN SODIUM 2 G: 2 SOLUTION INTRAVENOUS at 01:07

## 2021-07-13 RX ADMIN — ONDANSETRON 4 MG: 2 INJECTION, SOLUTION INTRAMUSCULAR; INTRAVENOUS at 01:07

## 2021-07-13 RX ADMIN — ROCURONIUM BROMIDE 35 MG: 10 INJECTION, SOLUTION INTRAVENOUS at 01:07

## 2021-07-13 RX ADMIN — HYDROCODONE BITARTRATE AND ACETAMINOPHEN 1 TABLET: 10; 325 TABLET ORAL at 02:07

## 2021-07-13 RX ADMIN — SUGAMMADEX 200 MG: 100 INJECTION, SOLUTION INTRAVENOUS at 01:07

## 2021-07-13 RX ADMIN — MIDAZOLAM HYDROCHLORIDE 2 MG: 1 INJECTION, SOLUTION INTRAMUSCULAR; INTRAVENOUS at 12:07

## 2021-07-13 RX ADMIN — FENTANYL CITRATE 100 MCG: 50 INJECTION, SOLUTION INTRAMUSCULAR; INTRAVENOUS at 01:07

## 2021-07-13 RX ADMIN — PROPOFOL 200 MG: 10 INJECTION, EMULSION INTRAVENOUS at 01:07

## 2021-07-17 ENCOUNTER — PATIENT MESSAGE (OUTPATIENT)
Dept: ORTHOPEDICS | Facility: CLINIC | Age: 26
End: 2021-07-17

## 2021-07-19 VITALS
SYSTOLIC BLOOD PRESSURE: 118 MMHG | DIASTOLIC BLOOD PRESSURE: 62 MMHG | BODY MASS INDEX: 25.53 KG/M2 | TEMPERATURE: 98 F | RESPIRATION RATE: 18 BRPM | WEIGHT: 162.69 LBS | OXYGEN SATURATION: 98 % | HEART RATE: 71 BPM | HEIGHT: 67 IN

## 2021-07-27 ENCOUNTER — PATIENT MESSAGE (OUTPATIENT)
Dept: ORTHOPEDICS | Facility: CLINIC | Age: 26
End: 2021-07-27

## 2021-07-27 ENCOUNTER — OFFICE VISIT (OUTPATIENT)
Dept: ORTHOPEDICS | Facility: CLINIC | Age: 26
End: 2021-07-27
Payer: MEDICAID

## 2021-07-27 VITALS
SYSTOLIC BLOOD PRESSURE: 121 MMHG | BODY MASS INDEX: 25.53 KG/M2 | WEIGHT: 162.69 LBS | HEIGHT: 67 IN | HEART RATE: 95 BPM | DIASTOLIC BLOOD PRESSURE: 79 MMHG | TEMPERATURE: 99 F

## 2021-07-27 DIAGNOSIS — M87.051 AVASCULAR NECROSIS OF BONE OF RIGHT HIP: ICD-10-CM

## 2021-07-27 DIAGNOSIS — M25.559 HIP PAIN: Primary | ICD-10-CM

## 2021-07-27 DIAGNOSIS — M87.052 AVASCULAR NECROSIS OF BONE OF LEFT HIP: Primary | ICD-10-CM

## 2021-07-27 PROCEDURE — 99024 PR POST-OP FOLLOW-UP VISIT: ICD-10-PCS | Mod: ,,, | Performed by: PHYSICIAN ASSISTANT

## 2021-07-27 PROCEDURE — 99213 OFFICE O/P EST LOW 20 MIN: CPT | Mod: PBBFAC | Performed by: PHYSICIAN ASSISTANT

## 2021-07-27 PROCEDURE — 99999 PR PBB SHADOW E&M-EST. PATIENT-LVL III: CPT | Mod: PBBFAC,,, | Performed by: PHYSICIAN ASSISTANT

## 2021-07-27 PROCEDURE — 99024 POSTOP FOLLOW-UP VISIT: CPT | Mod: ,,, | Performed by: PHYSICIAN ASSISTANT

## 2021-07-27 PROCEDURE — 99999 PR PBB SHADOW E&M-EST. PATIENT-LVL III: ICD-10-PCS | Mod: PBBFAC,,, | Performed by: PHYSICIAN ASSISTANT

## 2021-08-05 RX ORDER — OXYCODONE AND ACETAMINOPHEN 10; 325 MG/1; MG/1
1 TABLET ORAL EVERY 8 HOURS PRN
Qty: 21 TABLET | Refills: 0 | Status: SHIPPED | OUTPATIENT
Start: 2021-08-05 | End: 2021-11-01

## 2021-08-16 ENCOUNTER — PATIENT MESSAGE (OUTPATIENT)
Dept: ORTHOPEDICS | Facility: CLINIC | Age: 26
End: 2021-08-16

## 2021-08-26 ENCOUNTER — OFFICE VISIT (OUTPATIENT)
Dept: ORTHOPEDICS | Facility: CLINIC | Age: 26
End: 2021-08-26
Payer: MEDICAID

## 2021-08-26 ENCOUNTER — HOSPITAL ENCOUNTER (OUTPATIENT)
Dept: RADIOLOGY | Facility: HOSPITAL | Age: 26
Discharge: HOME | End: 2021-08-26
Attending: ORTHOPAEDIC SURGERY
Payer: MEDICAID

## 2021-08-26 VITALS
HEART RATE: 101 BPM | WEIGHT: 175.5 LBS | RESPIRATION RATE: 20 BRPM | HEIGHT: 67 IN | DIASTOLIC BLOOD PRESSURE: 70 MMHG | SYSTOLIC BLOOD PRESSURE: 119 MMHG | BODY MASS INDEX: 27.55 KG/M2

## 2021-08-26 DIAGNOSIS — M87.052 AVASCULAR NECROSIS OF BONE OF LEFT HIP: Primary | ICD-10-CM

## 2021-08-26 DIAGNOSIS — Z09 POSTOP CHECK: ICD-10-CM

## 2021-08-26 DIAGNOSIS — M87.052 AVASCULAR NECROSIS OF BONE OF LEFT HIP: ICD-10-CM

## 2021-08-26 DIAGNOSIS — M87.051 AVASCULAR NECROSIS OF BONE OF RIGHT HIP: ICD-10-CM

## 2021-08-26 DIAGNOSIS — M16.11 ARTHRITIS OF RIGHT HIP: ICD-10-CM

## 2021-08-26 PROCEDURE — 73502 X-RAY EXAM HIP UNI 2-3 VIEWS: CPT | Mod: TC,LT

## 2021-08-26 PROCEDURE — 99999 PR PBB SHADOW E&M-EST. PATIENT-LVL III: ICD-10-PCS | Mod: PBBFAC,,, | Performed by: ORTHOPAEDIC SURGERY

## 2021-08-26 PROCEDURE — 99999 PR PBB SHADOW E&M-EST. PATIENT-LVL III: CPT | Mod: PBBFAC,,, | Performed by: ORTHOPAEDIC SURGERY

## 2021-08-26 PROCEDURE — 73502 X-RAY EXAM HIP UNI 2-3 VIEWS: CPT | Mod: 26,LT,, | Performed by: RADIOLOGY

## 2021-08-26 PROCEDURE — 99214 OFFICE O/P EST MOD 30 MIN: CPT | Mod: 24,S$PBB,, | Performed by: ORTHOPAEDIC SURGERY

## 2021-08-26 PROCEDURE — 99214 PR OFFICE/OUTPT VISIT, EST, LEVL IV, 30-39 MIN: ICD-10-PCS | Mod: 24,S$PBB,, | Performed by: ORTHOPAEDIC SURGERY

## 2021-08-26 PROCEDURE — 73502 XR HIP WITH PELVIS WHEN PERFORMED, 2 OR 3 VIEWS LEFT: ICD-10-PCS | Mod: 26,LT,, | Performed by: RADIOLOGY

## 2021-08-26 PROCEDURE — 99213 OFFICE O/P EST LOW 20 MIN: CPT | Mod: PBBFAC | Performed by: ORTHOPAEDIC SURGERY

## 2021-08-26 RX ORDER — CYCLOBENZAPRINE HCL 10 MG
10 TABLET ORAL 3 TIMES DAILY PRN
Qty: 60 TABLET | Refills: 3 | Status: SHIPPED | OUTPATIENT
Start: 2021-08-26 | End: 2021-09-05

## 2021-09-14 DIAGNOSIS — M87.051 AVASCULAR NECROSIS OF BONE OF RIGHT HIP: ICD-10-CM

## 2021-09-14 DIAGNOSIS — Z01.818 PREOP TESTING: Primary | ICD-10-CM

## 2021-09-16 ENCOUNTER — HOSPITAL ENCOUNTER (OUTPATIENT)
Dept: CARDIOLOGY | Facility: HOSPITAL | Age: 26
Discharge: HOME | End: 2021-09-16
Attending: ORTHOPAEDIC SURGERY
Payer: MEDICAID

## 2021-09-16 ENCOUNTER — HOSPITAL ENCOUNTER (OUTPATIENT)
Dept: RADIOLOGY | Facility: HOSPITAL | Age: 26
Discharge: HOME | End: 2021-09-16
Attending: ORTHOPAEDIC SURGERY
Payer: MEDICAID

## 2021-09-16 DIAGNOSIS — Z01.818 PREOP TESTING: ICD-10-CM

## 2021-09-16 PROCEDURE — 71046 X-RAY EXAM CHEST 2 VIEWS: CPT | Mod: TC

## 2021-09-16 PROCEDURE — 93005 ELECTROCARDIOGRAM TRACING: CPT

## 2021-09-16 PROCEDURE — 93010 ELECTROCARDIOGRAM REPORT: CPT | Mod: ,,, | Performed by: INTERNAL MEDICINE

## 2021-09-16 PROCEDURE — 71046 X-RAY EXAM CHEST 2 VIEWS: CPT | Mod: 26,,, | Performed by: RADIOLOGY

## 2021-09-16 PROCEDURE — 93010 EKG 12-LEAD: ICD-10-PCS | Mod: ,,, | Performed by: INTERNAL MEDICINE

## 2021-09-16 PROCEDURE — 71046 XR CHEST PA AND LATERAL PRE-OP: ICD-10-PCS | Mod: 26,,, | Performed by: RADIOLOGY

## 2021-10-03 ENCOUNTER — LAB VISIT (OUTPATIENT)
Dept: URGENT CARE | Facility: CLINIC | Age: 26
End: 2021-10-03
Payer: MEDICAID

## 2021-10-03 DIAGNOSIS — Z01.818 PREOP TESTING: ICD-10-CM

## 2021-10-03 PROCEDURE — U0005 INFEC AGEN DETEC AMPLI PROBE: HCPCS | Performed by: ORTHOPAEDIC SURGERY

## 2021-10-03 PROCEDURE — U0003 INFECTIOUS AGENT DETECTION BY NUCLEIC ACID (DNA OR RNA); SEVERE ACUTE RESPIRATORY SYNDROME CORONAVIRUS 2 (SARS-COV-2) (CORONAVIRUS DISEASE [COVID-19]), AMPLIFIED PROBE TECHNIQUE, MAKING USE OF HIGH THROUGHPUT TECHNOLOGIES AS DESCRIBED BY CMS-2020-01-R: HCPCS | Performed by: ORTHOPAEDIC SURGERY

## 2021-10-04 ENCOUNTER — ANESTHESIA EVENT (OUTPATIENT)
Dept: SURGERY | Facility: HOSPITAL | Age: 26
End: 2021-10-04
Payer: MEDICAID

## 2021-10-04 LAB
SARS-COV-2 RNA RESP QL NAA+PROBE: NOT DETECTED
SARS-COV-2- CYCLE NUMBER: NORMAL

## 2021-10-05 ENCOUNTER — LAB VISIT (OUTPATIENT)
Dept: LAB | Facility: HOSPITAL | Age: 26
End: 2021-10-05
Attending: ORTHOPAEDIC SURGERY
Payer: MEDICAID

## 2021-10-05 ENCOUNTER — TELEPHONE (OUTPATIENT)
Dept: PREADMISSION TESTING | Facility: HOSPITAL | Age: 26
End: 2021-10-05

## 2021-10-05 DIAGNOSIS — Z01.818 PREOP TESTING: ICD-10-CM

## 2021-10-05 LAB
ABO + RH BLD: NORMAL
BLD GP AB SCN CELLS X3 SERPL QL: NORMAL

## 2021-10-05 PROCEDURE — 36415 COLL VENOUS BLD VENIPUNCTURE: CPT | Performed by: ORTHOPAEDIC SURGERY

## 2021-10-05 PROCEDURE — 86900 BLOOD TYPING SEROLOGIC ABO: CPT | Performed by: ORTHOPAEDIC SURGERY

## 2021-10-06 ENCOUNTER — ANESTHESIA (OUTPATIENT)
Dept: SURGERY | Facility: HOSPITAL | Age: 26
End: 2021-10-06
Payer: MEDICAID

## 2021-10-06 ENCOUNTER — HOSPITAL ENCOUNTER (OUTPATIENT)
Facility: HOSPITAL | Age: 26
Discharge: HOME | End: 2021-10-06
Attending: ORTHOPAEDIC SURGERY | Admitting: ORTHOPAEDIC SURGERY
Payer: MEDICAID

## 2021-10-06 ENCOUNTER — PATIENT MESSAGE (OUTPATIENT)
Dept: SURGERY | Facility: HOSPITAL | Age: 26
End: 2021-10-06

## 2021-10-06 DIAGNOSIS — M16.11 ARTHRITIS OF RIGHT HIP: ICD-10-CM

## 2021-10-06 DIAGNOSIS — M87.051 AVASCULAR NECROSIS OF BONE OF RIGHT HIP: Primary | ICD-10-CM

## 2021-10-06 LAB
B-HCG UR QL: NEGATIVE
CTP QC/QA: YES
HCT VFR BLD AUTO: 17.8 % (ref 37–48.5)
HCT VFR BLD AUTO: 32.6 % (ref 37–48.5)
HGB BLD-MCNC: 10.9 G/DL (ref 12–16)
HGB BLD-MCNC: 5.6 G/DL (ref 12–16)

## 2021-10-06 PROCEDURE — 97530 THERAPEUTIC ACTIVITIES: CPT

## 2021-10-06 PROCEDURE — 81025 URINE PREGNANCY TEST: CPT | Performed by: ORTHOPAEDIC SURGERY

## 2021-10-06 PROCEDURE — C9290 INJ, BUPIVACAINE LIPOSOME: HCPCS | Performed by: ORTHOPAEDIC SURGERY

## 2021-10-06 PROCEDURE — 27130 PR TOTAL HIP ARTHROPLASTY: ICD-10-PCS | Mod: RT,,, | Performed by: ORTHOPAEDIC SURGERY

## 2021-10-06 PROCEDURE — 85014 HEMATOCRIT: CPT | Performed by: ORTHOPAEDIC SURGERY

## 2021-10-06 PROCEDURE — 25000003 PHARM REV CODE 250: Performed by: ORTHOPAEDIC SURGERY

## 2021-10-06 PROCEDURE — 71000016 HC POSTOP RECOV ADDL HR: Performed by: ORTHOPAEDIC SURGERY

## 2021-10-06 PROCEDURE — 71000015 HC POSTOP RECOV 1ST HR: Performed by: ORTHOPAEDIC SURGERY

## 2021-10-06 PROCEDURE — 25000003 PHARM REV CODE 250: Performed by: ANESTHESIOLOGY

## 2021-10-06 PROCEDURE — 36000710: Performed by: ORTHOPAEDIC SURGERY

## 2021-10-06 PROCEDURE — 63600175 PHARM REV CODE 636 W HCPCS: Performed by: ORTHOPAEDIC SURGERY

## 2021-10-06 PROCEDURE — C1776 JOINT DEVICE (IMPLANTABLE): HCPCS | Performed by: ORTHOPAEDIC SURGERY

## 2021-10-06 PROCEDURE — 27130 PR TOTAL HIP ARTHROPLASTY: ICD-10-PCS | Mod: AS,RT,, | Performed by: PHYSICIAN ASSISTANT

## 2021-10-06 PROCEDURE — 36000711: Performed by: ORTHOPAEDIC SURGERY

## 2021-10-06 PROCEDURE — C1713 ANCHOR/SCREW BN/BN,TIS/BN: HCPCS | Performed by: ORTHOPAEDIC SURGERY

## 2021-10-06 PROCEDURE — 97165 OT EVAL LOW COMPLEX 30 MIN: CPT

## 2021-10-06 PROCEDURE — 63600175 PHARM REV CODE 636 W HCPCS: Performed by: NURSE ANESTHETIST, CERTIFIED REGISTERED

## 2021-10-06 PROCEDURE — 01214 ANES OPEN PX TOT HIP ARTHRP: CPT | Performed by: ORTHOPAEDIC SURGERY

## 2021-10-06 PROCEDURE — 85018 HEMOGLOBIN: CPT | Mod: 91 | Performed by: ORTHOPAEDIC SURGERY

## 2021-10-06 PROCEDURE — 71000033 HC RECOVERY, INTIAL HOUR: Performed by: ORTHOPAEDIC SURGERY

## 2021-10-06 PROCEDURE — 27130 TOTAL HIP ARTHROPLASTY: CPT | Mod: RT,,, | Performed by: ORTHOPAEDIC SURGERY

## 2021-10-06 PROCEDURE — 37000008 HC ANESTHESIA 1ST 15 MINUTES: Performed by: ORTHOPAEDIC SURGERY

## 2021-10-06 PROCEDURE — 63600175 PHARM REV CODE 636 W HCPCS: Performed by: ANESTHESIOLOGY

## 2021-10-06 PROCEDURE — 27201423 OPTIME MED/SURG SUP & DEVICES STERILE SUPPLY: Performed by: ORTHOPAEDIC SURGERY

## 2021-10-06 PROCEDURE — 37000009 HC ANESTHESIA EA ADD 15 MINS: Performed by: ORTHOPAEDIC SURGERY

## 2021-10-06 PROCEDURE — 97161 PT EVAL LOW COMPLEX 20 MIN: CPT

## 2021-10-06 PROCEDURE — 99900037 HC PT THERAPY SCREENING (STAT)

## 2021-10-06 PROCEDURE — A4216 STERILE WATER/SALINE, 10 ML: HCPCS | Performed by: ORTHOPAEDIC SURGERY

## 2021-10-06 PROCEDURE — 27130 TOTAL HIP ARTHROPLASTY: CPT | Mod: AS,RT,, | Performed by: PHYSICIAN ASSISTANT

## 2021-10-06 PROCEDURE — 25000003 PHARM REV CODE 250: Performed by: NURSE ANESTHETIST, CERTIFIED REGISTERED

## 2021-10-06 DEVICE — HEAD BIOLOX CERAMC FEM 36MM +1: Type: IMPLANTABLE DEVICE | Site: HIP | Status: FUNCTIONAL

## 2021-10-06 DEVICE — IMPLANTABLE DEVICE: Type: IMPLANTABLE DEVICE | Site: HIP | Status: FUNCTIONAL

## 2021-10-06 DEVICE — TI CANCELLOUS SCREW, Ø6.5 X 25MM
Type: IMPLANTABLE DEVICE | Site: HIP | Status: FUNCTIONAL
Brand: TI CANCELLOUS SCREW

## 2021-10-06 RX ORDER — ONDANSETRON 2 MG/ML
INJECTION INTRAMUSCULAR; INTRAVENOUS
Status: DISCONTINUED | OUTPATIENT
Start: 2021-10-06 | End: 2021-10-06

## 2021-10-06 RX ORDER — ONDANSETRON 2 MG/ML
4 INJECTION INTRAMUSCULAR; INTRAVENOUS EVERY 12 HOURS PRN
Status: CANCELLED | OUTPATIENT
Start: 2021-10-06

## 2021-10-06 RX ORDER — BUPIVACAINE HYDROCHLORIDE AND EPINEPHRINE 2.5; 5 MG/ML; UG/ML
INJECTION, SOLUTION EPIDURAL; INFILTRATION; INTRACAUDAL; PERINEURAL
Status: DISCONTINUED | OUTPATIENT
Start: 2021-10-06 | End: 2021-10-06 | Stop reason: HOSPADM

## 2021-10-06 RX ORDER — SODIUM CHLORIDE, SODIUM LACTATE, POTASSIUM CHLORIDE, CALCIUM CHLORIDE 600; 310; 30; 20 MG/100ML; MG/100ML; MG/100ML; MG/100ML
INJECTION, SOLUTION INTRAVENOUS CONTINUOUS PRN
Status: DISCONTINUED | OUTPATIENT
Start: 2021-10-06 | End: 2021-10-06

## 2021-10-06 RX ORDER — NEOSTIGMINE METHYLSULFATE 1 MG/ML
INJECTION, SOLUTION INTRAVENOUS
Status: DISCONTINUED | OUTPATIENT
Start: 2021-10-06 | End: 2021-10-06

## 2021-10-06 RX ORDER — ACETAMINOPHEN 325 MG/1
650 TABLET ORAL EVERY 4 HOURS PRN
Status: DISCONTINUED | OUTPATIENT
Start: 2021-10-06 | End: 2021-10-06 | Stop reason: HOSPADM

## 2021-10-06 RX ORDER — SODIUM CHLORIDE 0.9 % (FLUSH) 0.9 %
10 SYRINGE (ML) INJECTION EVERY 6 HOURS
Status: DISCONTINUED | OUTPATIENT
Start: 2021-10-06 | End: 2021-10-06 | Stop reason: HOSPADM

## 2021-10-06 RX ORDER — ONDANSETRON 2 MG/ML
4 INJECTION INTRAMUSCULAR; INTRAVENOUS DAILY PRN
Status: DISCONTINUED | OUTPATIENT
Start: 2021-10-06 | End: 2021-10-06 | Stop reason: HOSPADM

## 2021-10-06 RX ORDER — DOCUSATE SODIUM 100 MG/1
100 CAPSULE, LIQUID FILLED ORAL 2 TIMES DAILY
Status: CANCELLED | OUTPATIENT
Start: 2021-10-06

## 2021-10-06 RX ORDER — KETOROLAC TROMETHAMINE 30 MG/ML
15 INJECTION, SOLUTION INTRAMUSCULAR; INTRAVENOUS EVERY 8 HOURS PRN
Status: DISCONTINUED | OUTPATIENT
Start: 2021-10-06 | End: 2021-10-06 | Stop reason: HOSPADM

## 2021-10-06 RX ORDER — MIDAZOLAM HYDROCHLORIDE 1 MG/ML
INJECTION INTRAMUSCULAR; INTRAVENOUS
Status: DISCONTINUED | OUTPATIENT
Start: 2021-10-06 | End: 2021-10-06

## 2021-10-06 RX ORDER — ONDANSETRON 4 MG/1
8 TABLET, ORALLY DISINTEGRATING ORAL EVERY 6 HOURS PRN
Qty: 21 TABLET | Refills: 1 | Status: SHIPPED | OUTPATIENT
Start: 2021-10-06 | End: 2022-07-01

## 2021-10-06 RX ORDER — ACETAMINOPHEN 325 MG/1
650 TABLET ORAL EVERY 8 HOURS PRN
Status: CANCELLED | OUTPATIENT
Start: 2021-10-06

## 2021-10-06 RX ORDER — OXYCODONE HYDROCHLORIDE 5 MG/1
10 TABLET ORAL
Status: DISCONTINUED | OUTPATIENT
Start: 2021-10-06 | End: 2021-10-06 | Stop reason: HOSPADM

## 2021-10-06 RX ORDER — DEXAMETHASONE SODIUM PHOSPHATE 4 MG/ML
8 INJECTION, SOLUTION INTRA-ARTICULAR; INTRALESIONAL; INTRAMUSCULAR; INTRAVENOUS; SOFT TISSUE ONCE
Status: COMPLETED | OUTPATIENT
Start: 2021-10-06 | End: 2021-10-06

## 2021-10-06 RX ORDER — FENTANYL CITRATE 50 UG/ML
INJECTION, SOLUTION INTRAMUSCULAR; INTRAVENOUS
Status: DISCONTINUED | OUTPATIENT
Start: 2021-10-06 | End: 2021-10-06

## 2021-10-06 RX ORDER — OXYCODONE HYDROCHLORIDE 5 MG/1
5 TABLET ORAL
Status: DISCONTINUED | OUTPATIENT
Start: 2021-10-06 | End: 2021-10-06 | Stop reason: HOSPADM

## 2021-10-06 RX ORDER — PROPOFOL 10 MG/ML
VIAL (ML) INTRAVENOUS
Status: DISCONTINUED | OUTPATIENT
Start: 2021-10-06 | End: 2021-10-06

## 2021-10-06 RX ORDER — HYDROMORPHONE HYDROCHLORIDE 2 MG/ML
0.2 INJECTION, SOLUTION INTRAMUSCULAR; INTRAVENOUS; SUBCUTANEOUS EVERY 5 MIN PRN
Status: DISCONTINUED | OUTPATIENT
Start: 2021-10-06 | End: 2021-10-06 | Stop reason: HOSPADM

## 2021-10-06 RX ORDER — LIDOCAINE HYDROCHLORIDE 20 MG/ML
INJECTION, SOLUTION EPIDURAL; INFILTRATION; INTRACAUDAL; PERINEURAL
Status: DISCONTINUED | OUTPATIENT
Start: 2021-10-06 | End: 2021-10-06

## 2021-10-06 RX ORDER — MORPHINE SULFATE 4 MG/ML
2 INJECTION, SOLUTION INTRAMUSCULAR; INTRAVENOUS EVERY 4 HOURS PRN
Status: CANCELLED | OUTPATIENT
Start: 2021-10-06

## 2021-10-06 RX ORDER — SODIUM CHLORIDE 9 MG/ML
INJECTION, SOLUTION INTRAMUSCULAR; INTRAVENOUS; SUBCUTANEOUS
Status: DISCONTINUED | OUTPATIENT
Start: 2021-10-06 | End: 2021-10-06 | Stop reason: HOSPADM

## 2021-10-06 RX ORDER — SODIUM CHLORIDE 9 MG/ML
INJECTION, SOLUTION INTRAVENOUS CONTINUOUS
Status: CANCELLED | OUTPATIENT
Start: 2021-10-06

## 2021-10-06 RX ORDER — MORPHINE SULFATE 1 MG/ML
INJECTION, SOLUTION EPIDURAL; INTRATHECAL; INTRAVENOUS
Status: DISCONTINUED | OUTPATIENT
Start: 2021-10-06 | End: 2021-10-06 | Stop reason: HOSPADM

## 2021-10-06 RX ORDER — BISACODYL 10 MG
10 SUPPOSITORY, RECTAL RECTAL DAILY PRN
Status: CANCELLED | OUTPATIENT
Start: 2021-10-06

## 2021-10-06 RX ORDER — CEFAZOLIN SODIUM 2 G/50ML
2 SOLUTION INTRAVENOUS
Status: COMPLETED | OUTPATIENT
Start: 2021-10-06 | End: 2021-10-06

## 2021-10-06 RX ORDER — OXYCODONE AND ACETAMINOPHEN 5; 325 MG/1; MG/1
1 TABLET ORAL
Status: DISCONTINUED | OUTPATIENT
Start: 2021-10-06 | End: 2021-10-06 | Stop reason: HOSPADM

## 2021-10-06 RX ORDER — CHLORHEXIDINE GLUCONATE ORAL RINSE 1.2 MG/ML
10 SOLUTION DENTAL 2 TIMES DAILY
Status: CANCELLED | OUTPATIENT
Start: 2021-10-06 | End: 2021-10-11

## 2021-10-06 RX ORDER — OXYCODONE AND ACETAMINOPHEN 10; 325 MG/1; MG/1
1 TABLET ORAL EVERY 6 HOURS PRN
Qty: 28 TABLET | Refills: 0 | Status: SHIPPED | OUTPATIENT
Start: 2021-10-06 | End: 2021-11-01

## 2021-10-06 RX ORDER — CHLORHEXIDINE GLUCONATE ORAL RINSE 1.2 MG/ML
10 SOLUTION DENTAL
Status: DISCONTINUED | OUTPATIENT
Start: 2021-10-06 | End: 2021-10-06 | Stop reason: HOSPADM

## 2021-10-06 RX ORDER — KETAMINE HYDROCHLORIDE 10 MG/ML
INJECTION, SOLUTION INTRAMUSCULAR; INTRAVENOUS
Status: DISCONTINUED | OUTPATIENT
Start: 2021-10-06 | End: 2021-10-06

## 2021-10-06 RX ORDER — MORPHINE SULFATE 4 MG/ML
4 INJECTION, SOLUTION INTRAMUSCULAR; INTRAVENOUS ONCE
Status: DISCONTINUED | OUTPATIENT
Start: 2021-10-06 | End: 2021-10-06 | Stop reason: HOSPADM

## 2021-10-06 RX ORDER — ZOLPIDEM TARTRATE 5 MG/1
5 TABLET ORAL NIGHTLY PRN
Status: CANCELLED | OUTPATIENT
Start: 2021-10-06

## 2021-10-06 RX ORDER — GABAPENTIN 300 MG/1
300 CAPSULE ORAL 2 TIMES DAILY
Status: DISCONTINUED | OUTPATIENT
Start: 2021-10-06 | End: 2021-10-06 | Stop reason: HOSPADM

## 2021-10-06 RX ORDER — CEFAZOLIN SODIUM 2 G/50ML
2 SOLUTION INTRAVENOUS
Status: CANCELLED | OUTPATIENT
Start: 2021-10-06 | End: 2021-10-07

## 2021-10-06 RX ORDER — TRANEXAMIC ACID 100 MG/ML
1000 INJECTION, SOLUTION INTRAVENOUS
Status: COMPLETED | OUTPATIENT
Start: 2021-10-06 | End: 2021-10-06

## 2021-10-06 RX ORDER — GABAPENTIN 300 MG/1
300 CAPSULE ORAL 2 TIMES DAILY
Qty: 60 CAPSULE | Refills: 11 | Status: SHIPPED | OUTPATIENT
Start: 2021-10-06 | End: 2022-07-01

## 2021-10-06 RX ORDER — KETOROLAC TROMETHAMINE 30 MG/ML
INJECTION, SOLUTION INTRAMUSCULAR; INTRAVENOUS
Status: DISCONTINUED | OUTPATIENT
Start: 2021-10-06 | End: 2021-10-06 | Stop reason: HOSPADM

## 2021-10-06 RX ORDER — MELOXICAM 15 MG/1
15 TABLET ORAL DAILY
Qty: 30 TABLET | Refills: 2 | Status: SHIPPED | OUTPATIENT
Start: 2021-10-06 | End: 2022-04-29

## 2021-10-06 RX ORDER — ROCURONIUM BROMIDE 10 MG/ML
INJECTION, SOLUTION INTRAVENOUS
Status: DISCONTINUED | OUTPATIENT
Start: 2021-10-06 | End: 2021-10-06

## 2021-10-06 RX ADMIN — TRANEXAMIC ACID 1000 MG: 100 INJECTION, SOLUTION INTRAVENOUS at 11:10

## 2021-10-06 RX ADMIN — PROPOFOL 50 MG: 10 INJECTION, EMULSION INTRAVENOUS at 10:10

## 2021-10-06 RX ADMIN — ONDANSETRON 4 MG: 2 INJECTION, SOLUTION INTRAMUSCULAR; INTRAVENOUS at 11:10

## 2021-10-06 RX ADMIN — HYDROMORPHONE HYDROCHLORIDE 0.2 MG: 2 INJECTION INTRAMUSCULAR; INTRAVENOUS; SUBCUTANEOUS at 12:10

## 2021-10-06 RX ADMIN — NEOSTIGMINE METHYLSULFATE 5 MG: 1 INJECTION INTRAVENOUS at 11:10

## 2021-10-06 RX ADMIN — GABAPENTIN 300 MG: 300 CAPSULE ORAL at 08:10

## 2021-10-06 RX ADMIN — FENTANYL CITRATE 50 MCG: 50 INJECTION, SOLUTION INTRAMUSCULAR; INTRAVENOUS at 09:10

## 2021-10-06 RX ADMIN — TRANEXAMIC ACID 1000 MG: 100 INJECTION, SOLUTION INTRAVENOUS at 10:10

## 2021-10-06 RX ADMIN — KETAMINE HYDROCHLORIDE 20 MG: 10 INJECTION, SOLUTION INTRAMUSCULAR; INTRAVENOUS at 10:10

## 2021-10-06 RX ADMIN — ROCURONIUM BROMIDE 40 MG: 10 INJECTION, SOLUTION INTRAVENOUS at 09:10

## 2021-10-06 RX ADMIN — ROCURONIUM BROMIDE 10 MG: 10 INJECTION, SOLUTION INTRAVENOUS at 11:10

## 2021-10-06 RX ADMIN — PROPOFOL 150 MG: 10 INJECTION, EMULSION INTRAVENOUS at 09:10

## 2021-10-06 RX ADMIN — MIDAZOLAM HYDROCHLORIDE 2 MG: 1 INJECTION, SOLUTION INTRAMUSCULAR; INTRAVENOUS at 09:10

## 2021-10-06 RX ADMIN — CHLORHEXIDINE GLUCONATE 0.12% ORAL RINSE 10 ML: 1.2 LIQUID ORAL at 08:10

## 2021-10-06 RX ADMIN — ACETAMINOPHEN 650 MG: 325 TABLET ORAL at 08:10

## 2021-10-06 RX ADMIN — CEFAZOLIN SODIUM 2 G: 2 SOLUTION INTRAVENOUS at 09:10

## 2021-10-06 RX ADMIN — FENTANYL CITRATE 25 MCG: 50 INJECTION, SOLUTION INTRAMUSCULAR; INTRAVENOUS at 11:10

## 2021-10-06 RX ADMIN — DEXAMETHASONE SODIUM PHOSPHATE 8 MG: 4 INJECTION, SOLUTION INTRAMUSCULAR; INTRAVENOUS at 09:10

## 2021-10-06 RX ADMIN — SODIUM CHLORIDE, SODIUM LACTATE, POTASSIUM CHLORIDE, AND CALCIUM CHLORIDE: .6; .31; .03; .02 INJECTION, SOLUTION INTRAVENOUS at 09:10

## 2021-10-06 RX ADMIN — GLYCOPYRROLATE 0.8 MG: 0.2 INJECTION, SOLUTION INTRAMUSCULAR; INTRAVENOUS at 11:10

## 2021-10-06 RX ADMIN — LIDOCAINE HYDROCHLORIDE 100 MG: 20 INJECTION, SOLUTION EPIDURAL; INFILTRATION; INTRACAUDAL; PERINEURAL at 09:10

## 2021-10-06 RX ADMIN — ONDANSETRON 4 MG: 2 INJECTION, SOLUTION INTRAMUSCULAR; INTRAVENOUS at 10:10

## 2021-10-06 RX ADMIN — OXYCODONE HYDROCHLORIDE AND ACETAMINOPHEN 1 TABLET: 5; 325 TABLET ORAL at 01:10

## 2021-10-06 RX ADMIN — KETAMINE HYDROCHLORIDE 10 MG: 10 INJECTION, SOLUTION INTRAMUSCULAR; INTRAVENOUS at 11:10

## 2021-10-06 RX ADMIN — FENTANYL CITRATE 25 MCG: 50 INJECTION, SOLUTION INTRAMUSCULAR; INTRAVENOUS at 10:10

## 2021-10-07 DIAGNOSIS — M87.051 AVASCULAR NECROSIS OF BONE OF RIGHT HIP: Primary | ICD-10-CM

## 2021-10-07 DIAGNOSIS — M87.052 AVASCULAR NECROSIS OF BONE OF LEFT HIP: ICD-10-CM

## 2021-10-07 PROCEDURE — G0180 PR HOME HEALTH MD CERTIFICATION: ICD-10-PCS | Mod: ,,, | Performed by: ORTHOPAEDIC SURGERY

## 2021-10-07 PROCEDURE — G0180 MD CERTIFICATION HHA PATIENT: HCPCS | Mod: ,,, | Performed by: ORTHOPAEDIC SURGERY

## 2021-10-08 VITALS
WEIGHT: 168.13 LBS | OXYGEN SATURATION: 98 % | BODY MASS INDEX: 26.39 KG/M2 | TEMPERATURE: 98 F | SYSTOLIC BLOOD PRESSURE: 134 MMHG | HEIGHT: 67 IN | RESPIRATION RATE: 20 BRPM | HEART RATE: 70 BPM | DIASTOLIC BLOOD PRESSURE: 90 MMHG

## 2021-10-15 ENCOUNTER — EXTERNAL HOME HEALTH (OUTPATIENT)
Dept: HOME HEALTH SERVICES | Facility: HOSPITAL | Age: 26
End: 2021-10-15
Payer: MEDICAID

## 2021-10-21 ENCOUNTER — OFFICE VISIT (OUTPATIENT)
Dept: ORTHOPEDICS | Facility: CLINIC | Age: 26
End: 2021-10-21
Payer: MEDICAID

## 2021-10-21 VITALS
TEMPERATURE: 98 F | WEIGHT: 168 LBS | SYSTOLIC BLOOD PRESSURE: 129 MMHG | HEART RATE: 101 BPM | DIASTOLIC BLOOD PRESSURE: 85 MMHG | BODY MASS INDEX: 26.37 KG/M2 | HEIGHT: 67 IN

## 2021-10-21 DIAGNOSIS — Z96.641 HISTORY OF TOTAL RIGHT HIP REPLACEMENT: ICD-10-CM

## 2021-10-21 DIAGNOSIS — M87.051 AVASCULAR NECROSIS OF BONE OF RIGHT HIP: Primary | ICD-10-CM

## 2021-10-21 PROCEDURE — 99024 POSTOP FOLLOW-UP VISIT: CPT | Mod: ,,, | Performed by: PHYSICIAN ASSISTANT

## 2021-10-21 PROCEDURE — 99213 OFFICE O/P EST LOW 20 MIN: CPT | Mod: PBBFAC | Performed by: PHYSICIAN ASSISTANT

## 2021-10-21 PROCEDURE — 99999 PR PBB SHADOW E&M-EST. PATIENT-LVL III: ICD-10-PCS | Mod: PBBFAC,,, | Performed by: PHYSICIAN ASSISTANT

## 2021-10-21 PROCEDURE — 99999 PR PBB SHADOW E&M-EST. PATIENT-LVL III: CPT | Mod: PBBFAC,,, | Performed by: PHYSICIAN ASSISTANT

## 2021-10-21 PROCEDURE — 99024 PR POST-OP FOLLOW-UP VISIT: ICD-10-PCS | Mod: ,,, | Performed by: PHYSICIAN ASSISTANT

## 2021-10-26 ENCOUNTER — PATIENT MESSAGE (OUTPATIENT)
Dept: ORTHOPEDICS | Facility: CLINIC | Age: 26
End: 2021-10-26
Payer: MEDICAID

## 2021-10-29 ENCOUNTER — PATIENT MESSAGE (OUTPATIENT)
Dept: ORTHOPEDICS | Facility: CLINIC | Age: 26
End: 2021-10-29
Payer: MEDICAID

## 2021-10-29 DIAGNOSIS — M87.051 AVASCULAR NECROSIS OF BONE OF RIGHT HIP: Primary | ICD-10-CM

## 2021-10-29 DIAGNOSIS — Z96.641 HISTORY OF TOTAL RIGHT HIP REPLACEMENT: ICD-10-CM

## 2021-11-01 ENCOUNTER — CLINICAL SUPPORT (OUTPATIENT)
Dept: REHABILITATION | Facility: HOSPITAL | Age: 26
End: 2021-11-01
Attending: ORTHOPAEDIC SURGERY
Payer: MEDICAID

## 2021-11-01 DIAGNOSIS — Z96.641 HISTORY OF TOTAL RIGHT HIP REPLACEMENT: ICD-10-CM

## 2021-11-01 DIAGNOSIS — M87.051 AVASCULAR NECROSIS OF BONE OF RIGHT HIP: ICD-10-CM

## 2021-11-01 DIAGNOSIS — R29.898 DECREASED STRENGTH OF LOWER EXTREMITY: ICD-10-CM

## 2021-11-01 PROCEDURE — 97161 PT EVAL LOW COMPLEX 20 MIN: CPT

## 2021-11-01 PROCEDURE — 97110 THERAPEUTIC EXERCISES: CPT

## 2021-11-01 RX ORDER — OXYCODONE AND ACETAMINOPHEN 10; 325 MG/1; MG/1
1 TABLET ORAL EVERY 8 HOURS PRN
Qty: 15 TABLET | Refills: 0 | Status: ON HOLD | OUTPATIENT
Start: 2021-11-01 | End: 2022-06-16 | Stop reason: HOSPADM

## 2021-11-03 ENCOUNTER — PATIENT MESSAGE (OUTPATIENT)
Dept: ORTHOPEDICS | Facility: CLINIC | Age: 26
End: 2021-11-03
Payer: MEDICAID

## 2021-11-08 ENCOUNTER — CLINICAL SUPPORT (OUTPATIENT)
Dept: REHABILITATION | Facility: HOSPITAL | Age: 26
End: 2021-11-08
Payer: MEDICAID

## 2021-11-08 DIAGNOSIS — R29.898 DECREASED STRENGTH OF LOWER EXTREMITY: ICD-10-CM

## 2021-11-08 PROCEDURE — 97110 THERAPEUTIC EXERCISES: CPT

## 2021-11-09 RX ORDER — OXYCODONE AND ACETAMINOPHEN 10; 325 MG/1; MG/1
1 TABLET ORAL EVERY 8 HOURS PRN
Qty: 15 TABLET | Refills: 0 | OUTPATIENT
Start: 2021-11-09

## 2021-11-16 ENCOUNTER — CLINICAL SUPPORT (OUTPATIENT)
Dept: REHABILITATION | Facility: HOSPITAL | Age: 26
End: 2021-11-16
Payer: MEDICAID

## 2021-11-16 DIAGNOSIS — R29.898 DECREASED STRENGTH OF LOWER EXTREMITY: ICD-10-CM

## 2021-11-16 PROCEDURE — 97110 THERAPEUTIC EXERCISES: CPT

## 2021-11-18 ENCOUNTER — OFFICE VISIT (OUTPATIENT)
Dept: ORTHOPEDICS | Facility: CLINIC | Age: 26
End: 2021-11-18
Payer: MEDICAID

## 2021-11-18 ENCOUNTER — HOSPITAL ENCOUNTER (OUTPATIENT)
Dept: RADIOLOGY | Facility: HOSPITAL | Age: 26
Discharge: HOME | End: 2021-11-18
Attending: ORTHOPAEDIC SURGERY
Payer: MEDICAID

## 2021-11-18 VITALS — BODY MASS INDEX: 26.37 KG/M2 | HEIGHT: 67 IN | WEIGHT: 168 LBS

## 2021-11-18 DIAGNOSIS — M87.051 AVASCULAR NECROSIS OF BONE OF RIGHT HIP: ICD-10-CM

## 2021-11-18 DIAGNOSIS — M87.052 AVASCULAR NECROSIS OF BONE OF LEFT HIP: ICD-10-CM

## 2021-11-18 DIAGNOSIS — Z96.641 STATUS POST TOTAL HIP REPLACEMENT, RIGHT: Primary | ICD-10-CM

## 2021-11-18 DIAGNOSIS — M16.11 ARTHRITIS OF RIGHT HIP: ICD-10-CM

## 2021-11-18 PROCEDURE — 73521 X-RAY EXAM HIPS BI 2 VIEWS: CPT | Mod: 26,,, | Performed by: RADIOLOGY

## 2021-11-18 PROCEDURE — 99999 PR PBB SHADOW E&M-EST. PATIENT-LVL III: CPT | Mod: PBBFAC,,, | Performed by: ORTHOPAEDIC SURGERY

## 2021-11-18 PROCEDURE — 73521 X-RAY EXAM HIPS BI 2 VIEWS: CPT | Mod: TC

## 2021-11-18 PROCEDURE — 99213 OFFICE O/P EST LOW 20 MIN: CPT | Mod: PBBFAC | Performed by: ORTHOPAEDIC SURGERY

## 2021-11-18 PROCEDURE — 99024 POSTOP FOLLOW-UP VISIT: CPT | Mod: ,,, | Performed by: ORTHOPAEDIC SURGERY

## 2021-11-18 PROCEDURE — 99999 PR PBB SHADOW E&M-EST. PATIENT-LVL III: ICD-10-PCS | Mod: PBBFAC,,, | Performed by: ORTHOPAEDIC SURGERY

## 2021-11-18 PROCEDURE — 73521 XR HIPS BILATERAL 2 VIEW INCL AP PELVIS: ICD-10-PCS | Mod: 26,,, | Performed by: RADIOLOGY

## 2021-11-18 PROCEDURE — 99024 PR POST-OP FOLLOW-UP VISIT: ICD-10-PCS | Mod: ,,, | Performed by: ORTHOPAEDIC SURGERY

## 2021-11-19 ENCOUNTER — CLINICAL SUPPORT (OUTPATIENT)
Dept: REHABILITATION | Facility: HOSPITAL | Age: 26
End: 2021-11-19
Payer: MEDICAID

## 2021-11-19 DIAGNOSIS — R29.898 DECREASED STRENGTH OF LOWER EXTREMITY: Primary | ICD-10-CM

## 2021-11-19 PROCEDURE — 97110 THERAPEUTIC EXERCISES: CPT | Mod: CQ

## 2021-11-22 ENCOUNTER — CLINICAL SUPPORT (OUTPATIENT)
Dept: REHABILITATION | Facility: HOSPITAL | Age: 26
End: 2021-11-22
Payer: MEDICAID

## 2021-11-22 DIAGNOSIS — R29.898 DECREASED STRENGTH OF LOWER EXTREMITY: Primary | ICD-10-CM

## 2021-11-22 PROCEDURE — 97110 THERAPEUTIC EXERCISES: CPT | Mod: CQ

## 2021-11-30 ENCOUNTER — CLINICAL SUPPORT (OUTPATIENT)
Dept: REHABILITATION | Facility: HOSPITAL | Age: 26
End: 2021-11-30
Payer: MEDICAID

## 2021-11-30 DIAGNOSIS — R29.898 DECREASED STRENGTH OF LOWER EXTREMITY: ICD-10-CM

## 2021-11-30 PROCEDURE — 97110 THERAPEUTIC EXERCISES: CPT

## 2021-12-03 ENCOUNTER — PATIENT MESSAGE (OUTPATIENT)
Dept: ORTHOPEDICS | Facility: CLINIC | Age: 26
End: 2021-12-03
Payer: MEDICAID

## 2021-12-07 ENCOUNTER — CLINICAL SUPPORT (OUTPATIENT)
Dept: REHABILITATION | Facility: HOSPITAL | Age: 26
End: 2021-12-07
Payer: MEDICAID

## 2021-12-07 DIAGNOSIS — R29.898 DECREASED STRENGTH OF LOWER EXTREMITY: ICD-10-CM

## 2021-12-07 PROCEDURE — 97110 THERAPEUTIC EXERCISES: CPT

## 2021-12-09 ENCOUNTER — CLINICAL SUPPORT (OUTPATIENT)
Dept: REHABILITATION | Facility: HOSPITAL | Age: 26
End: 2021-12-09
Payer: MEDICAID

## 2021-12-09 DIAGNOSIS — R29.898 DECREASED STRENGTH OF LOWER EXTREMITY: ICD-10-CM

## 2021-12-09 PROCEDURE — 97110 THERAPEUTIC EXERCISES: CPT | Mod: CQ

## 2021-12-16 ENCOUNTER — CLINICAL SUPPORT (OUTPATIENT)
Dept: REHABILITATION | Facility: HOSPITAL | Age: 26
End: 2021-12-16
Payer: MEDICAID

## 2021-12-16 DIAGNOSIS — R29.898 DECREASED STRENGTH OF LOWER EXTREMITY: ICD-10-CM

## 2021-12-16 PROCEDURE — 97110 THERAPEUTIC EXERCISES: CPT

## 2021-12-20 ENCOUNTER — CLINICAL SUPPORT (OUTPATIENT)
Dept: REHABILITATION | Facility: HOSPITAL | Age: 26
End: 2021-12-20
Payer: MEDICAID

## 2021-12-20 ENCOUNTER — PATIENT MESSAGE (OUTPATIENT)
Dept: ADMINISTRATIVE | Facility: OTHER | Age: 26
End: 2021-12-20
Payer: MEDICAID

## 2021-12-20 ENCOUNTER — DOCUMENTATION ONLY (OUTPATIENT)
Dept: REHABILITATION | Facility: HOSPITAL | Age: 26
End: 2021-12-20

## 2021-12-20 DIAGNOSIS — R29.898 DECREASED STRENGTH OF LOWER EXTREMITY: Primary | ICD-10-CM

## 2021-12-20 PROCEDURE — 97110 THERAPEUTIC EXERCISES: CPT | Mod: CQ

## 2021-12-23 ENCOUNTER — CLINICAL SUPPORT (OUTPATIENT)
Dept: REHABILITATION | Facility: HOSPITAL | Age: 26
End: 2021-12-23
Payer: MEDICAID

## 2021-12-23 DIAGNOSIS — R29.898 DECREASED STRENGTH OF LOWER EXTREMITY: ICD-10-CM

## 2021-12-23 PROCEDURE — 97110 THERAPEUTIC EXERCISES: CPT

## 2021-12-29 ENCOUNTER — CLINICAL SUPPORT (OUTPATIENT)
Dept: REHABILITATION | Facility: HOSPITAL | Age: 26
End: 2021-12-29
Payer: MEDICAID

## 2021-12-29 DIAGNOSIS — R29.898 DECREASED STRENGTH OF LOWER EXTREMITY: ICD-10-CM

## 2021-12-29 PROCEDURE — 97140 MANUAL THERAPY 1/> REGIONS: CPT

## 2021-12-29 PROCEDURE — 97110 THERAPEUTIC EXERCISES: CPT

## 2022-01-04 ENCOUNTER — CLINICAL SUPPORT (OUTPATIENT)
Dept: REHABILITATION | Facility: HOSPITAL | Age: 27
End: 2022-01-04
Payer: MEDICAID

## 2022-01-04 DIAGNOSIS — R29.898 DECREASED STRENGTH OF LOWER EXTREMITY: ICD-10-CM

## 2022-01-04 PROCEDURE — 97110 THERAPEUTIC EXERCISES: CPT

## 2022-01-04 PROCEDURE — 97140 MANUAL THERAPY 1/> REGIONS: CPT

## 2022-01-04 NOTE — PROGRESS NOTES
[facility]   Physical Therapist Treatment Note     Name: Miriam Jett  Clinic Number: [id number]    Therapy Diagnosis:   Encounter Diagnosis   Name Primary?    Decreased strength of lower extremity      Physician: Spenser Krishnamurthy MD    Visit Date: 1/4/2022    Physician Orders: PT Eval and Treat   Medical Diagnosis from Referral: Right Total Hip Replacement  Evaluation Date: 11/1/2021  RA: 12/7/2021  Authorization Period Expiration: 10/29/2022  Plan of Care Expiration: 2//2022  RA: 12/23/2021  Visit # / Visits authorized: 11/ 20     FOTO NEXT VISIT      PTA Visit #: 1/5     Time In: 10:30  Time Out: 11:15  Total Billable Time: 45 minutes    SUBJECTIVE     Pt reports: no pain at this time.    She was compliant with home exercise program.  Response to previous treatment: improved mobility/sore  Functional change: more fluid gait mechanics w/std cane    **Right posterior hip precautions    Pain: 0/10  Location: ---    OBJECTIVE     Objective Measures updated at progress report unless specified.     Left shoulder:  Full ROM present but  pain and apprehension noted between 100-120 arc  Tenderness noted at bicep tendon, infraspinatus, UT, and LS region  Flexion: 4/5  Abduction: 4/5  ER/IR: 4/5 painful  Bicep: 4/5 painful  Treatment     Miriam received the treatments listed below:  Add resisted lateral steps next     therapeutic exercises to develop strength, endurance, ROM, flexibility, posture and core stabilization for 40 minutes including:  TM 6min 2.0mph  TM incline 1min 2 grade: increased right sided low back pain  Sup hip circles with PPT 2x10  Sup hip flexion from radha stretch px 1x10  Sustained bridge w/10 clams BTB x3 sets  Side lying hip abduction 3x8 B  Hook lying butterfly stretch to open hips/adductors     Prone hip ext B 2x10  Prone HSC w/slow lowering 5# 2x10 R    F    manual therapy techniques: Soft tissue Mobilization were applied to the: right groin for   "15 minutes, including:  STM right glute max, med, and piriformis  Scar massage  Trp release glute med, groin  FDN: left UT, LS, infraspinatus piston     gait training to improve functional mobility and safety for 0 minutes, including:  ---      Patient Education and Home Exercises     Home Exercises Provided and Patient Education Provided     Education provided:   - HEP review  - Gt mechanics for increased wt bearing through RLE    Written Home Exercises Provided: Patient instructed to cont prior HEP. Exercises were reviewed and Miriam was able to demonstrate them prior to the end of the session.  Miriam demonstrated good  understanding of the education provided. See EMR under Patient Instructions for exercises provided during therapy sessions    ASSESSMENT      Pt cont to ambulate with slight right hip hike occasionally, she can correct deviation with verbal and visual cue. Cont TM walking to improve right LE swing thru time, but unable to tolerate increase in incline due to low back pain. She continues to have impaired strength and limited glute med strength impacting her movement pattern/gait and daily activities requiring continuing skilled intervention.   She reports moderate to severe left shoulder pain since August. Noted weakness with abduction and ER. Trp present in UT and LS region.   Miriam is progressing well towards her goals.   Pt prognosis is Good.     Pt will continue to benefit from skilled outpatient physical therapy to address the deficits listed in the problem list box on initial evaluation, provide pt/family education and to maximize pt's level of independence in the home and community environment.     Pt's spiritual, cultural and educational needs considered and pt agreeable to plan of care and goals.     Anticipated barriers to physical therapy: transportation    Goals:       Short Term Goals: In 6 weeks   1.I with HEP. PROGRESSING  2.Pt to increase reps during 30" timed sit to stand " from 5 to 9 for improved transfer efficiency.   8 REPS MET  3.Pt to increase hip strength to 3+/5 for greater hip stability in preparation for stair negotiaiton . MET   4.Pt to increase BLE strength by 1/2 grade. MET  5.Pt to have pain less than 2 at all times.MET  6.Pt to improve score on the FOTO by 10%.  7. Pt to be educated on postural/body mechanics awareness. PROGRESSING     Long Term Goals: In 8 weeks  1.Patient to improve score on the FOTO to 36% or less..  2. Patient to demo increase in LE strength to 5/5  3. Patient to have decreased pain to 0 at worst.  4. Patient to perform daily activities including bed mobility, transfers, and community ambulation and levels without limitation.        Plan   Plan of care Certification: 12/23/2021 to 2/14/2021     Outpatient Physical Therapy 2 times weekly for 10 weeks to include the following interventions: Electrical Stimulation hip, Gait Training, Manual Therapy, Moist Heat/ Ice, Neuromuscular Re-ed, Orthotic Management and Training, Patient Education, Self Care, Therapeutic Activites and Therapeutic Exercise.    Rubi Mcgee, PT

## 2022-01-06 ENCOUNTER — CLINICAL SUPPORT (OUTPATIENT)
Dept: REHABILITATION | Facility: HOSPITAL | Age: 27
End: 2022-01-06
Payer: MEDICAID

## 2022-01-06 DIAGNOSIS — R29.898 DECREASED STRENGTH OF LOWER EXTREMITY: ICD-10-CM

## 2022-01-06 PROCEDURE — 97110 THERAPEUTIC EXERCISES: CPT

## 2022-01-06 NOTE — PROGRESS NOTES
[facility]   Physical Therapist Treatment Note     Name: Miriam Jett  Clinic Number: [id number]    Therapy Diagnosis:   Encounter Diagnosis   Name Primary?    Decreased strength of lower extremity      Physician: Spenser Krishnamurthy MD    Visit Date: 1/6/2022    Physician Orders: PT Eval and Treat   Medical Diagnosis from Referral: Right Total Hip Replacement  Evaluation Date: 11/1/2021  RA: 12/7/2021  Authorization Period Expiration: 10/29/2022  Plan of Care Expiration: 2//2022  RA: 12/23/2021  Visit # / Visits authorized: 12/ 20     FOTO NEXT VISIT      PTA Visit #: 1/5     Time In: 10:30  Time Out: 11:15  Total Billable Time: 45 minutes    SUBJECTIVE     Pt reports: no pain at this time.    She was compliant with home exercise program.  Response to previous treatment: improved mobility/sore  Functional change: more fluid gait mechanics w/std cane    **Right posterior hip precautions    Pain: 0/10  Location: ---    OBJECTIVE     Objective Measures updated at progress report unless specified.     Left shoulder:  Full ROM present but  pain and apprehension noted between 100-120 arc  Tenderness noted at bicep tendon, infraspinatus, UT, and LS region  Flexion: 4/5  Abduction: 4/5  ER/IR: 4/5 painful  Bicep: 4/5 painful  Treatment     Miriam received the treatments listed below:  Add doorway hip flexor stretch for right next session    therapeutic exercises to develop strength, endurance, ROM, flexibility, posture and core stabilization for 50 minutes including:  T  Sup hip circles with PPT 2x10  Sup hip flexion from radha stretch px 1x10  HS stretch with strap kick ups 1x10 B  Bridges 1x10, with add/yoga block 1x10, with add and kick out 1x5  Walk with pivot turn 4 ea side    Side lying clam, hip abduction, circles 1x10 series  Prone hip ext B 2x10  Squats 10# KB 1x10 at 24 inch box  Lateral stepping resisted ytb 2x10  Reverse stepping  Golfer lift with chair 1x10 with LLE  and 1x5 with RLE  Qp cat/cow 1x10  Qp UE lifts 1x10  Qp LE slide 1x10  F    manual therapy techniques: Soft tissue Mobilization were applied to the: right groin for  0 minutes, including:  STM right glute max, med, and piriformis  Scar massage  Trp release glute med, groin  FDN: left UT, LS, infraspinatus piston     gait training to improve functional mobility and safety for 0 minutes, including:  ---      Patient Education and Home Exercises     Home Exercises Provided and Patient Education Provided     Education provided:   - HEP review  - Gt mechanics for increased wt bearing through RLE    Written Home Exercises Provided: Patient instructed to cont prior HEP. Exercises were reviewed and Miriam was able to demonstrate them prior to the end of the session.  Miriam demonstrated good  understanding of the education provided. See EMR under Patient Instructions for exercises provided during therapy sessions    ASSESSMENT      Pt cont to ambulate with slight right hip drop occasionally, she can correct deviation with verbal and visual cue. Pt does present with apprehensive and robotic movement at times. Today we practiced pivoy turns for more fluid motions during transitions, huge improvement noted at end of session. Cont TM walking to improve right LE swing thru time. She continues to have limited glute med strength impacting her movement pattern/gait and daily activities requiring continuing skilled intervention.   No shoulder pain reported post last session.    Miriam is progressing well towards her goals.   Pt prognosis is Good.     Pt will continue to benefit from skilled outpatient physical therapy to address the deficits listed in the problem list box on initial evaluation, provide pt/family education and to maximize pt's level of independence in the home and community environment.     Pt's spiritual, cultural and educational needs considered and pt agreeable to plan of care and goals.     Anticipated  "barriers to physical therapy: transportation    Goals:       Short Term Goals: In 6 weeks   1.I with HEP. PROGRESSING  2.Pt to increase reps during 30" timed sit to stand from 5 to 9 for improved transfer efficiency.   8 REPS MET  3.Pt to increase hip strength to 3+/5 for greater hip stability in preparation for stair negotiaiton . MET   4.Pt to increase BLE strength by 1/2 grade. MET  5.Pt to have pain less than 2 at all times.MET  6.Pt to improve score on the FOTO by 10%.  7. Pt to be educated on postural/body mechanics awareness. PROGRESSING     Long Term Goals: In 8 weeks  1.Patient to improve score on the FOTO to 36% or less..  2. Patient to demo increase in LE strength to 5/5  3. Patient to have decreased pain to 0 at worst.  4. Patient to perform daily activities including bed mobility, transfers, and community ambulation and levels without limitation.        Plan   Plan of care Certification: 12/23/2021 to 2/14/2021     Outpatient Physical Therapy 2 times weekly for 10 weeks to include the following interventions: Electrical Stimulation hip, Gait Training, Manual Therapy, Moist Heat/ Ice, Neuromuscular Re-ed, Orthotic Management and Training, Patient Education, Self Care, Therapeutic Activites and Therapeutic Exercise.    Rubi Mcgee, PT     "

## 2022-02-25 ENCOUNTER — TELEPHONE (OUTPATIENT)
Dept: ORTHOPEDICS | Facility: CLINIC | Age: 27
End: 2022-02-25
Payer: MEDICAID

## 2022-02-25 DIAGNOSIS — M87.051 AVASCULAR NECROSIS OF BONE OF RIGHT HIP: ICD-10-CM

## 2022-02-25 DIAGNOSIS — M87.052 AVASCULAR NECROSIS OF BONE OF LEFT HIP: Primary | ICD-10-CM

## 2022-02-25 NOTE — TELEPHONE ENCOUNTER
LVM for pt in regards to pain management ref. ms        ----- Message from Lesly Devries sent at 2/25/2022 10:42 AM CST -----  Contact: self  Pt would like to get a referral for pain management, please give her a callback at [phone]. Thanks

## 2022-03-21 ENCOUNTER — PATIENT MESSAGE (OUTPATIENT)
Dept: ORTHOPEDICS | Facility: CLINIC | Age: 27
End: 2022-03-21
Payer: MEDICAID

## 2022-03-21 ENCOUNTER — NURSE TRIAGE (OUTPATIENT)
Dept: ADMINISTRATIVE | Facility: CLINIC | Age: 27
End: 2022-03-21
Payer: MEDICAID

## 2022-03-21 NOTE — TELEPHONE ENCOUNTER
Pt requesting pain medication for pain in her right hip after falling. Offered to triage patient but she is uninterested at this time. Will call back with any questions/concenrs. Would like follow up in this regard.    Reason for Disposition   Prescription refill request for a CONTROLLED substance (e.g., narcotics, ADHD medicines)    Protocols used: MEDICATION QUESTION CALL-A-AH

## 2022-03-23 ENCOUNTER — TELEPHONE (OUTPATIENT)
Dept: ORTHOPEDICS | Facility: CLINIC | Age: 27
End: 2022-03-23
Payer: MEDICAID

## 2022-03-23 DIAGNOSIS — M87.052 AVASCULAR NECROSIS OF BONE OF LEFT HIP: Primary | ICD-10-CM

## 2022-03-23 DIAGNOSIS — Z96.641 STATUS POST TOTAL HIP REPLACEMENT, RIGHT: ICD-10-CM

## 2022-03-23 NOTE — TELEPHONE ENCOUNTER
Spoke to pt in regards to out patient XR. Pt verbalized understanding. Informed pt that she would have to get her pain medication from pain management. Pt verbalized understanding.   Also told pt that she could  letter stating her functionalities on the day she gets her XR. Pt verbalized understanding. ms

## 2022-03-24 ENCOUNTER — HOSPITAL ENCOUNTER (OUTPATIENT)
Dept: RADIOLOGY | Facility: HOSPITAL | Age: 27
Discharge: HOME | End: 2022-03-24
Attending: ORTHOPAEDIC SURGERY
Payer: MEDICAID

## 2022-03-24 DIAGNOSIS — M87.052 AVASCULAR NECROSIS OF BONE OF LEFT HIP: ICD-10-CM

## 2022-03-24 DIAGNOSIS — Z96.641 STATUS POST TOTAL HIP REPLACEMENT, RIGHT: ICD-10-CM

## 2022-03-24 PROCEDURE — 73521 X-RAY EXAM HIPS BI 2 VIEWS: CPT | Mod: TC

## 2022-03-24 PROCEDURE — 73521 XR HIPS BILATERAL 2 VIEW INCL AP PELVIS: ICD-10-PCS | Mod: 26,,, | Performed by: RADIOLOGY

## 2022-03-24 PROCEDURE — 73521 X-RAY EXAM HIPS BI 2 VIEWS: CPT | Mod: 26,,, | Performed by: RADIOLOGY

## 2022-03-28 ENCOUNTER — PATIENT MESSAGE (OUTPATIENT)
Dept: ORTHOPEDICS | Facility: CLINIC | Age: 27
End: 2022-03-28
Payer: MEDICAID

## 2022-04-13 ENCOUNTER — PATIENT MESSAGE (OUTPATIENT)
Dept: ORTHOPEDICS | Facility: CLINIC | Age: 27
End: 2022-04-13
Payer: MEDICAID

## 2022-04-13 DIAGNOSIS — M25.512 LEFT SHOULDER PAIN, UNSPECIFIED CHRONICITY: Primary | ICD-10-CM

## 2022-04-28 NOTE — PROGRESS NOTES
Chronic Pain-Tele-Medicine-New Consult    The patient location is: home  The chief complaint leading to consultation is: bilateral hip pain  Visit type: Virtual visit with synchronous audio and video  Total time spent with patient: 20m  Each patient to whom he or she provides medical services by telemedicine is: (1) informed of the relationship between the physician and patient and the respective role of any other health care provider with respect to management of the patient; and (2) notified that he or she may decline to receive medical services by telemedicine and may withdraw from such care at any time.    Notes:    Referring Physician: Spenser Krishnamurthy MD    PCP: Primary Doctor No    Chief Complaint: hip pain    Past Medical History:   Diagnosis Date    Miscarriage      Past Surgical History:   Procedure Laterality Date    BONE GRAFT Left 7/13/2021    Procedure: BONE GRAFT;  Surgeon: Spenser Krishnamurthy MD;  Location: [facility];  Service: Orthopedics;  Laterality: Left;  pro-dense    CORE DECOMPRESSION OF HEAD OF FEMUR Left 7/13/2021    Procedure: CORE DECOMPRESSION, FEMUR, HEAD;  Surgeon: Spenser Krishnamurthy MD;  Location: [facility] OR;  Service: Orthopedics;  Laterality: Left;    HIP ARTHROPLASTY Right 10/6/2021    Procedure: ARTHROPLASTY, HIP;  Surgeon: Spenser Krishnamurthy MD;  Location: [facility] OR;  Service: Orthopedics;  Laterality: Right;    INJECTION OF JOINT Right 7/12/2021    Procedure: right IA Hip Joint injection- per Dr Ruelas;  Surgeon: Juan David Ho MD;  Location: [facility];  Service: Pain Management;  Laterality: Right;     Review of patient's allergies indicates:  No Known Allergies    Current Outpatient Medications   Medication Sig    diclofenac sodium (VOLTAREN) 1 % Gel Apply 2 g topically 3 (three) times daily.    gabapentin (NEURONTIN) 300 MG capsule Take 1 capsule (300 mg total) by mouth 2 (two) times daily.    megestroL (MEGACE ES) 625 mg/5 mL (125 mg/mL) Susp daily as needed.      meloxicam (MOBIC) 15 MG tablet Take 1 tablet (15 mg total) by mouth once daily.    ondansetron (ZOFRAN-ODT) 4 MG TbDL Take 2 tablets (8 mg total) by mouth every 6 (six) hours as needed.    ondansetron (ZOFRAN-ODT) 4 MG TbDL Take 2 tablets (8 mg total) by mouth every 6 (six) hours as needed (Nausea).    oxyCODONE-acetaminophen (PERCOCET)  mg per tablet Take 1 tablet by mouth every 8 (eight) hours as needed for Pain. (Patient not taking: Reported on 4/29/2022)     No current facility-administered medications for this visit.        SUBJECTIVE:    Miriam Jett is a 26 y.o. female with past medical  history significant status post right total hip arthroplasty 10/06/2021 and left hip core decompression 07/13/2021 who presents to tele-medicine clinic for the evaluation of bilateral hip pain.  Patient reports pain has been present since her last surgery in October of last year.  Patient reports pain along bilateral incision sites which radiates into the groin in L2 distribution.  Pain is constant and described as aching in nature and numbness along the incision sites.  Pain is exacerbated with prolonged standing and with ambulation.  Patient is able to stand or ambulate approximately 5 minutes before requiring rest.  Patient does endorse associated sensation of buckling or weakness in the lower extremities associated with her pain.  Pain is marginally improved with Aleve or Tylenol.    Of note patient saw orthopedics 04/29/2022 with diagnosis of idiopathic avascular necrosis of both hips.  Patient received refill and Mobic and Voltaren gel.    Patient reports significant motor weakness and loss of sensations.  Patient denies night fever/night sweats, urinary incontinence, bowel incontinence and significant weight loss.    Non-Pharmacologic Treatments:  Physical Therapy/Home Exercise: yes  Ice/Heat:yes  TENS: no  Acupuncture: no  Massage: no  Chiropractic: no    Other: yes; sx    Pain Medications:  -  Opioids: Percocet (Oxycodone/Acetaminophen)  - Adjuvant Medications: Mobic (Meloxicam) and Neurontin (Gabapentin)    Pain Procedures:   None    Imaging:   X-Ray Hips Bilateral 2 View Incl AP Pelvis      Details    Reading Physician Reading Date Result Priority   Saul Rose MD  [phone] 3/24/2022 Routine     Narrative & Impression  EXAMINATION:  XR HIPS BILATERAL 2 VIEW INCL AP PELVIS     CLINICAL HISTORY:  Idiopathic aseptic necrosis of left femur     TECHNIQUE:  AP view of the pelvis and frogleg lateral views of both hips were performed.     COMPARISON:  Prior radiographs     FINDINGS:  Right hip total arthroplasty findings are stable.  No acute osseous abnormality.  Left hip demonstrates similar appearance with postoperative decompression and osteonecrosis changes.  No interval femoral head collapse or volume loss.  No acute abnormality.     Impression:     Similar appearance of the pelvis/hips.        Electronically signed by: Saul Rose MD  Date:                                            03/24/2022  Time:                                           09:21       GENERAL:  No weight loss, malaise or fevers.  HEENT:   No recent changes in vision or hearing  NECK:  Negative for lumps, no difficulty with swallowing.  RESPIRATORY:  Negative for cough, wheezing or shortness of breath, patient denies any recent URI.  CARDIOVASCULAR:  Negative for chest pain, leg swelling or palpitations.  GI:  Negative for abdominal discomfort, blood in stools or black stools or change in bowel habits.  MUSCULOSKELETAL:  See HPI.  SKIN:  Negative for lesions, rash, and itching.  PSYCH:  No mood disorder or recent psychosocial stressors.  Patients sleep is not disturbed secondary to pain.  HEMATOLOGY/LYMPHOLOGY:  Negative for prolonged bleeding, bruising easily or swollen nodes.    NEURO:   No history of headaches, syncope, paralysis, seizures or tremors.  All other reviewed and negative other than  HPI.    OBJECTIVE:  Physical exam performed virtually with patient guided on specific actions and diagnostic maneuvers    GENERAL: Well appearing, in no acute distress, alert and oriented x3.  PSYCH:  Mood and affect appropriate.  SKIN: Skin color, texture, turgor normal, no rashes or lesions.  HEAD/FACE:  Normocephalic, atraumatic.   PULM: No evidence of respiratory difficulty, symmetric chest rise.  EXTREMITIES:  Well-healed horizontal incision across right hip, loss of sensation to touch.  Small 1 in incision overlying left hip.  MUSCULOSKELETAL: Able to stand on heels & toes.  Bilateral upper and lower extremity strength appears normal and symmetric.  No atrophy or tone abnormalities are noted.    ASSESSMENT: 26 y.o. year old female with bilateral hip pain, consistent with     1. Avascular necrosis of bone of left hip  Ambulatory referral/consult to Pain Clinic    IR Peripheral Nerve Injection    Case Request-RAD/Other Procedure Area: bilateral femoral and obturator nerve block with RN IV sedation    IR Peripheral Nerve Injection   2. Avascular necrosis of bone of right hip  Ambulatory referral/consult to Pain Clinic    IR Peripheral Nerve Injection    Case Request-RAD/Other Procedure Area: bilateral femoral and obturator nerve block with RN IV sedation    IR Peripheral Nerve Injection         PLAN:   - Interventions:  Schedule for bilateral femoral and obturator nerve block to see if this helps with bilateral hip pain.  We discussed with significant relief she may be a candidate for cooled radiofrequency ablation for more sustained relief.. Explained the risks and benefits of the procedure in detail with the patient today in clinic along with alternative treatment options, and the patient elected to pursue the intervention at this time.      - Anticoagulation use: no no anticoagulation    - report:  Reviewed and consistent with medication use as prescribed.    - Medications:  -I will start the patient on a  prescription compound cream to see if this helps with their pain.  Compound medication will include lidocaine, orphenadrine, aspirin, caffeine, ketamine for anti-inflammatory, neuropathic and anesthetic properties.  Patient can apply this medication 2-4 times daily to painful areas.      - Therapy:  -We discussed continuing physical therapy to help manage the patient/s painful condition. The patient was counseled that muscle strengthening will improve the long term prognosis in regards to pain and may also help increase range of motion and mobility.     - Imaging: Reviewed available imaging with patient and answered any questions they had regarding study.    - Follow up visit: return to clinic in 4-6 weeks      The above plan and management options were discussed at length with patient. Patient is in agreement with the above and verbalized understanding.    - I discussed the goals of interventional chronic pain management with the patient on today's visit. We discussed a multimodal and systematic approach to pain.  This includes diagnostic and therapeutic injections, adjuvant pharmacologic treatment, physical therapy, and at times psychiatry.  I emphasized the importance of regular exercise, core strengthening and stretching, diet and weight loss as a cornerstone of long-term pain management.    - This condition does not require this patient to take time off of work, and the primary goal of our Pain Management services is to improve the patient's functional capacity.  - Patient Questions: Answered all of the patient's questions regarding diagnoses, therapy, treatment and next steps      Hever Reveles MD  Interventional Pain Management  [facility]    Disclaimer:  This note was prepared using voice recognition system and is likely to have sound alike errors that may have been overlooked even after proof reading.  Please call me with any questions

## 2022-04-29 ENCOUNTER — OFFICE VISIT (OUTPATIENT)
Dept: ORTHOPEDICS | Facility: CLINIC | Age: 27
End: 2022-04-29
Payer: MEDICAID

## 2022-04-29 VITALS — WEIGHT: 168 LBS | HEIGHT: 67 IN | BODY MASS INDEX: 26.37 KG/M2

## 2022-04-29 DIAGNOSIS — M16.11 ARTHRITIS OF RIGHT HIP: Primary | ICD-10-CM

## 2022-04-29 DIAGNOSIS — Z96.641 STATUS POST TOTAL HIP REPLACEMENT, RIGHT: ICD-10-CM

## 2022-04-29 PROCEDURE — 3008F PR BODY MASS INDEX (BMI) DOCUMENTED: ICD-10-PCS | Mod: CPTII,,, | Performed by: PHYSICIAN ASSISTANT

## 2022-04-29 PROCEDURE — 99999 PR PBB SHADOW E&M-EST. PATIENT-LVL III: ICD-10-PCS | Mod: PBBFAC,,, | Performed by: PHYSICIAN ASSISTANT

## 2022-04-29 PROCEDURE — 99214 OFFICE O/P EST MOD 30 MIN: CPT | Mod: S$PBB,,, | Performed by: PHYSICIAN ASSISTANT

## 2022-04-29 PROCEDURE — 1159F PR MEDICATION LIST DOCUMENTED IN MEDICAL RECORD: ICD-10-PCS | Mod: CPTII,,, | Performed by: PHYSICIAN ASSISTANT

## 2022-04-29 PROCEDURE — 1159F MED LIST DOCD IN RCRD: CPT | Mod: CPTII,,, | Performed by: PHYSICIAN ASSISTANT

## 2022-04-29 PROCEDURE — 1160F RVW MEDS BY RX/DR IN RCRD: CPT | Mod: CPTII,,, | Performed by: PHYSICIAN ASSISTANT

## 2022-04-29 PROCEDURE — 3008F BODY MASS INDEX DOCD: CPT | Mod: CPTII,,, | Performed by: PHYSICIAN ASSISTANT

## 2022-04-29 PROCEDURE — 99999 PR PBB SHADOW E&M-EST. PATIENT-LVL III: CPT | Mod: PBBFAC,,, | Performed by: PHYSICIAN ASSISTANT

## 2022-04-29 PROCEDURE — 99214 PR OFFICE/OUTPT VISIT, EST, LEVL IV, 30-39 MIN: ICD-10-PCS | Mod: S$PBB,,, | Performed by: PHYSICIAN ASSISTANT

## 2022-04-29 PROCEDURE — 99213 OFFICE O/P EST LOW 20 MIN: CPT | Mod: PBBFAC | Performed by: PHYSICIAN ASSISTANT

## 2022-04-29 PROCEDURE — 1160F PR REVIEW ALL MEDS BY PRESCRIBER/CLIN PHARMACIST DOCUMENTED: ICD-10-PCS | Mod: CPTII,,, | Performed by: PHYSICIAN ASSISTANT

## 2022-04-29 RX ORDER — MELOXICAM 15 MG/1
15 TABLET ORAL DAILY
Qty: 30 TABLET | Refills: 0 | Status: SHIPPED | OUTPATIENT
Start: 2022-04-29 | End: 2022-07-01

## 2022-04-29 RX ORDER — DICLOFENAC SODIUM 10 MG/G
2 GEL TOPICAL 3 TIMES DAILY
Qty: 1 EACH | Refills: 2 | Status: SHIPPED | OUTPATIENT
Start: 2022-04-29 | End: 2022-07-01

## 2022-04-29 NOTE — PROGRESS NOTES
Subjective:     Patient ID: Miriam Jett is a 26 y.o. female.    Chief Complaint: Pain and Post-op Evaluation of the Right Hip    HPI:  25-year-old diagnosed with avascular necrosis of both hips.  She is using a crutch to get around.  Rheumatology could not find the etiology of the avascular necrosis.  We will consider this idiopathic.  She was treated with Fosamax without much success.  She does take NSAIDs also.  She is using a crutch to get around having severe pain at night in the groin on the right side.  The left hip is not bothering her at this time.  Pain level is 4/10.  Unable to work.  Denies any back pain any pain down the left leg.  There is occasional pain that starts in the groin and radiates to the knee and down to her foot.  No numbness or tingling no muscle cramps.  No loss of bowel bladder control.  Denies any fever or chills or shortness of breath or difficulty with chewing or swallowing loss of bowel bladder control blurry vision or double vision loss of sense smell or taste    Denies alcohol bingeing or a smoking or any rheumatological disease or diving etc      11/18/2021  Right MADHU 10/06/2021, left hip core decompression 07/13/2021.  Patient stated she has absolutely no pain in any hip now.  She is going to physical therapy at [facility] on [address].  She is using the walker however she is to be advanced to a cane.  She is not taking any medications at this time.  She is very pleased that her surgical incision is so small.  I did tell her we took extra care to make it small enough because she is 26 years of age and did not want to give her ugly scar.  No fever no chills no shortness of breath no difficulty with chewing or swallowing loss of bowel bladder control blurry vision double vision loss sense smell or taste    4/29/2022  Patient presents today for her six-month follow-up status post right hip replacement 10/06/2021.  Patient previously also underwent a left core  decompression 07/13/2021.  She notes today overall vast improvement to the pain of the right hip stating on a day-to-day basis it is 0/10.  She notes minor increased with use and activity over the course of the day that is affecting full quality of life.  She notes it is still hurting her when she has a heavy or long course of walking and standing periods.  She has returned to driving.  She has not gone back to work.  She is not using any assistant devices today in ambulation.  She states she was able to become less dependent on the cane around [place] time.  Additionally she still is sensing some hypersensitivity over the incision as well as down the lateral aspect of the right hip.  When discussing medications,  She notes she was no longer taking the Mobic as she has run out in May needed from time to time with her activity.  We discussed that should she return to taking this medication she is to take Tylenol as needed as well and not ibuprofen.    Patient is presently denying any shortness of breath, chest pain, fever/chills, nausea/vomiting, loss of taste or smell, numbness/tingling or sensation changes, loss of bladder or bowel function, loss of taste/smell.  She denies any recent trauma or infections.    Again she is not using any assistant devices today for ambulation support    Past Medical History:   Diagnosis Date    Miscarriage      Past Surgical History:   Procedure Laterality Date    BONE GRAFT Left 7/13/2021    Procedure: BONE GRAFT;  Surgeon: Spenser Krishnamurthy MD;  Location: [facility] OR;  Service: Orthopedics;  Laterality: Left;  pro-dense    CORE DECOMPRESSION OF HEAD OF FEMUR Left 7/13/2021    Procedure: CORE DECOMPRESSION, FEMUR, HEAD;  Surgeon: Spenser Krishnamurthy MD;  Location: [facility] OR;  Service: Orthopedics;  Laterality: Left;    HIP ARTHROPLASTY Right 10/6/2021    Procedure: ARTHROPLASTY, HIP;  Surgeon: Spenser Krishnamurthy MD;  Location: [facility] OR;  Service: Orthopedics;  Laterality:  Right;    INJECTION OF JOINT Right 7/12/2021    Procedure: right IA Hip Joint injection- per Dr Ruelas;  Surgeon: Juan David Ho MD;  Location: [facility];  Service: Pain Management;  Laterality: Right;     Family History   Problem Relation Age of Onset    Birth defects Neg Hx      Social History     Socioeconomic History    Marital status: Single   Tobacco Use    Smoking status: Never Smoker    Smokeless tobacco: Never Used   Substance and Sexual Activity    Alcohol use: No    Drug use: No    Sexual activity: Yes     Partners: Male     Birth control/protection: None     Medication List with Changes/Refills   New Medications    DICLOFENAC SODIUM (VOLTAREN) 1 % GEL    Apply 2 g topically 3 (three) times daily.    MELOXICAM (MOBIC) 15 MG TABLET    Take 1 tablet (15 mg total) by mouth once daily.   Current Medications    GABAPENTIN (NEURONTIN) 300 MG CAPSULE    Take 1 capsule (300 mg total) by mouth 2 (two) times daily.    MEGESTROL (MEGACE ES) 625 MG/5 ML (125 MG/ML) SUSP    daily as needed.     ONDANSETRON (ZOFRAN-ODT) 4 MG TBDL    Take 2 tablets (8 mg total) by mouth every 6 (six) hours as needed.    ONDANSETRON (ZOFRAN-ODT) 4 MG TBDL    Take 2 tablets (8 mg total) by mouth every 6 (six) hours as needed (Nausea).    OXYCODONE-ACETAMINOPHEN (PERCOCET)  MG PER TABLET    Take 1 tablet by mouth every 8 (eight) hours as needed for Pain.   Discontinued Medications    MELOXICAM (MOBIC) 15 MG TABLET    Take 1 tablet (15 mg total) by mouth once daily. Take with food     Review of patient's allergies indicates:  No Known Allergies  Review of Systems   Constitutional: Negative for decreased appetite.   HENT: Negative for tinnitus.    Eyes: Negative for double vision.   Cardiovascular: Negative for chest pain.   Respiratory: Negative for wheezing.    Hematologic/Lymphatic: Negative for bleeding problem.   Skin: Negative for dry skin.   Musculoskeletal: Positive for joint pain. Negative for arthritis, back pain,  gout, joint swelling, neck pain and stiffness.   Gastrointestinal: Negative for abdominal pain.   Genitourinary: Negative for bladder incontinence.   Neurological: Negative for numbness, paresthesias and sensory change.   Psychiatric/Behavioral: Negative for altered mental status.       Objective:   Body mass index is 26.31 kg/m².  There were no vitals filed for this visit.       General    Constitutional: She is oriented to person, place, and time. She appears well-developed.   HENT:   Head: Atraumatic.   Eyes: EOM are normal.   Cardiovascular: Normal rate.    Pulmonary/Chest: Effort normal.   Neurological: She is alert and oriented to person, place, and time.   Psychiatric: Judgment normal.             Pelvis is level  Right hip : Passive hip internal external rotation without pain in the groin.  Still slightly weak to hip flexors and abductors compared to the left side  Left hip: passive internal external rotation without pain in the groin.  Hip flexors, abductors adductors quads and hamstrings are 5/5  Right knee:  Full motion.  Collaterals and cruciates are stable.  No pain on palpation, nor edema appreciated   Left knee: full motion. collaterals and cruciates are stable. No pain on palpation, nor edema appreciated   Calves are soft nontender  DF/PF intact bilaterally  DP 1+  Skin is warm to touch no obvious lesions      Assessment:     Encounter Diagnoses   Name Primary?    Arthritis of right hip Yes    Status post total hip replacement, right         Plan:   Arthritis of right hip  -     meloxicam (MOBIC) 15 MG tablet; Take 1 tablet (15 mg total) by mouth once daily.  Dispense: 30 tablet; Refill: 0  -     diclofenac sodium (VOLTAREN) 1 % Gel; Apply 2 g topically 3 (three) times daily.  Dispense: 1 each; Refill: 2    Status post total hip replacement, right  -     meloxicam (MOBIC) 15 MG tablet; Take 1 tablet (15 mg total) by mouth once daily.  Dispense: 30 tablet; Refill: 0  -     diclofenac sodium  (VOLTAREN) 1 % Gel; Apply 2 g topically 3 (three) times daily.  Dispense: 1 each; Refill: 2         There are no Patient Instructions on file for this visit.    Patient presents to me as a six-month follow-up right hip replacement.  Overall she notes she is doing even better the then her progress seen at last appointment.  We discussed intermittent pain that she feels is affecting her quality of life and daily routine.  She states she ran out of the Mobic medication, I will refill this for her today to use as needed.  Since it has been several months since patient is taking the medication I recommend taking it 1 pill daily for 2 weeks then as needed.  Additionally we discussed topical gels.  Have given the patient a prescription for Voltaren gel to use as needed.  I would like her to follow up in 4-6 months with Dr. Krishnamurthy for further evaluation.  She may call with any questions or concerns in the interim.  She was encouraged to advance her activity of daily living, light activity no jumping or strenuous levels.      Disclaimer: This note was prepared using a voice recognition system and is likely to have sound alike errors within the text.

## 2022-05-02 ENCOUNTER — TELEPHONE (OUTPATIENT)
Dept: PAIN MEDICINE | Facility: CLINIC | Age: 27
End: 2022-05-02
Payer: MEDICAID

## 2022-05-03 ENCOUNTER — OFFICE VISIT (OUTPATIENT)
Dept: ORTHOPEDICS | Facility: CLINIC | Age: 27
End: 2022-05-03
Payer: MEDICAID

## 2022-05-03 ENCOUNTER — HOSPITAL ENCOUNTER (OUTPATIENT)
Dept: RADIOLOGY | Facility: HOSPITAL | Age: 27
Discharge: HOME | End: 2022-05-03
Attending: STUDENT IN AN ORGANIZED HEALTH CARE EDUCATION/TRAINING PROGRAM
Payer: MEDICAID

## 2022-05-03 ENCOUNTER — OFFICE VISIT (OUTPATIENT)
Dept: PAIN MEDICINE | Facility: CLINIC | Age: 27
End: 2022-05-03
Payer: MEDICAID

## 2022-05-03 ENCOUNTER — TELEPHONE (OUTPATIENT)
Dept: PAIN MEDICINE | Facility: CLINIC | Age: 27
End: 2022-05-03

## 2022-05-03 VITALS — WEIGHT: 168 LBS | HEIGHT: 67 IN | BODY MASS INDEX: 26.37 KG/M2

## 2022-05-03 DIAGNOSIS — G89.29 CHRONIC LEFT SHOULDER PAIN: ICD-10-CM

## 2022-05-03 DIAGNOSIS — M87.019 AVASCULAR NECROSIS OF BONE OF SHOULDER: Primary | ICD-10-CM

## 2022-05-03 DIAGNOSIS — M25.511 CHRONIC RIGHT SHOULDER PAIN: ICD-10-CM

## 2022-05-03 DIAGNOSIS — M25.512 CHRONIC LEFT SHOULDER PAIN: ICD-10-CM

## 2022-05-03 DIAGNOSIS — M25.512 LEFT SHOULDER PAIN, UNSPECIFIED CHRONICITY: ICD-10-CM

## 2022-05-03 DIAGNOSIS — M87.052 AVASCULAR NECROSIS OF BONE OF LEFT HIP: ICD-10-CM

## 2022-05-03 DIAGNOSIS — M87.051 AVASCULAR NECROSIS OF BONE OF RIGHT HIP: ICD-10-CM

## 2022-05-03 DIAGNOSIS — G89.29 CHRONIC RIGHT SHOULDER PAIN: ICD-10-CM

## 2022-05-03 PROCEDURE — 73030 XR SHOULDER COMPLETE 2 OR MORE VIEWS BILATERAL: ICD-10-PCS | Mod: 26,50,, | Performed by: RADIOLOGY

## 2022-05-03 PROCEDURE — 1159F PR MEDICATION LIST DOCUMENTED IN MEDICAL RECORD: ICD-10-PCS | Mod: CPTII,95,, | Performed by: ANESTHESIOLOGY

## 2022-05-03 PROCEDURE — 73030 X-RAY EXAM OF SHOULDER: CPT | Mod: 26,50,, | Performed by: RADIOLOGY

## 2022-05-03 PROCEDURE — 99204 PR OFFICE/OUTPT VISIT, NEW, LEVL IV, 45-59 MIN: ICD-10-PCS | Mod: 95,,, | Performed by: ANESTHESIOLOGY

## 2022-05-03 PROCEDURE — 99213 OFFICE O/P EST LOW 20 MIN: CPT | Mod: PBBFAC | Performed by: STUDENT IN AN ORGANIZED HEALTH CARE EDUCATION/TRAINING PROGRAM

## 2022-05-03 PROCEDURE — 1159F PR MEDICATION LIST DOCUMENTED IN MEDICAL RECORD: ICD-10-PCS | Mod: CPTII,,, | Performed by: STUDENT IN AN ORGANIZED HEALTH CARE EDUCATION/TRAINING PROGRAM

## 2022-05-03 PROCEDURE — 99999 PR PBB SHADOW E&M-EST. PATIENT-LVL III: CPT | Mod: PBBFAC,,, | Performed by: STUDENT IN AN ORGANIZED HEALTH CARE EDUCATION/TRAINING PROGRAM

## 2022-05-03 PROCEDURE — 99999 PR PBB SHADOW E&M-EST. PATIENT-LVL III: ICD-10-PCS | Mod: PBBFAC,,, | Performed by: STUDENT IN AN ORGANIZED HEALTH CARE EDUCATION/TRAINING PROGRAM

## 2022-05-03 PROCEDURE — 1160F PR REVIEW ALL MEDS BY PRESCRIBER/CLIN PHARMACIST DOCUMENTED: ICD-10-PCS | Mod: CPTII,95,, | Performed by: ANESTHESIOLOGY

## 2022-05-03 PROCEDURE — 73030 X-RAY EXAM OF SHOULDER: CPT | Mod: TC,50

## 2022-05-03 PROCEDURE — 1160F RVW MEDS BY RX/DR IN RCRD: CPT | Mod: CPTII,95,, | Performed by: ANESTHESIOLOGY

## 2022-05-03 PROCEDURE — 99204 OFFICE O/P NEW MOD 45 MIN: CPT | Mod: 95,,, | Performed by: ANESTHESIOLOGY

## 2022-05-03 PROCEDURE — 99213 OFFICE O/P EST LOW 20 MIN: CPT | Mod: S$PBB,,, | Performed by: STUDENT IN AN ORGANIZED HEALTH CARE EDUCATION/TRAINING PROGRAM

## 2022-05-03 PROCEDURE — 1160F RVW MEDS BY RX/DR IN RCRD: CPT | Mod: CPTII,,, | Performed by: STUDENT IN AN ORGANIZED HEALTH CARE EDUCATION/TRAINING PROGRAM

## 2022-05-03 PROCEDURE — 3008F PR BODY MASS INDEX (BMI) DOCUMENTED: ICD-10-PCS | Mod: CPTII,,, | Performed by: STUDENT IN AN ORGANIZED HEALTH CARE EDUCATION/TRAINING PROGRAM

## 2022-05-03 PROCEDURE — 3008F BODY MASS INDEX DOCD: CPT | Mod: CPTII,,, | Performed by: STUDENT IN AN ORGANIZED HEALTH CARE EDUCATION/TRAINING PROGRAM

## 2022-05-03 PROCEDURE — 99213 PR OFFICE/OUTPT VISIT, EST, LEVL III, 20-29 MIN: ICD-10-PCS | Mod: S$PBB,,, | Performed by: STUDENT IN AN ORGANIZED HEALTH CARE EDUCATION/TRAINING PROGRAM

## 2022-05-03 PROCEDURE — 1159F MED LIST DOCD IN RCRD: CPT | Mod: CPTII,95,, | Performed by: ANESTHESIOLOGY

## 2022-05-03 PROCEDURE — 1159F MED LIST DOCD IN RCRD: CPT | Mod: CPTII,,, | Performed by: STUDENT IN AN ORGANIZED HEALTH CARE EDUCATION/TRAINING PROGRAM

## 2022-05-03 PROCEDURE — 1160F PR REVIEW ALL MEDS BY PRESCRIBER/CLIN PHARMACIST DOCUMENTED: ICD-10-PCS | Mod: CPTII,,, | Performed by: STUDENT IN AN ORGANIZED HEALTH CARE EDUCATION/TRAINING PROGRAM

## 2022-05-03 NOTE — PROGRESS NOTES
Orthopaedics Sports Medicine     Shoulder Initial Visit         5/3/2022    Referring MD: Self, Aaareferral    Chief Complaint   Patient presents with    Left Shoulder - Pain         History of Present Illness:   Miriam Jett is a 26 y.o. right-hand dominant female who presents with LEFT greater than right shoulder pain and dysfunction.    Onset of the symptoms was 10/2021     Inciting event: No specific injury, gradual onset of symptoms.  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  She has had a right total hip arthroplasty and a left hip core decompression.  There is no clear reason why she has gotten avascular necrosis of her hips.    Current symptoms include constant aching, throbbing, sharp, and shooting pains. The shoulder also pops and makes a grinding sound.     Pain is aggravated by night, reaching, and movements.      Evaluation to date: X-Ray     Treatment to date: Rest, activity modification, OTC medication     Past Medical History:   Past Medical History:   Diagnosis Date    Miscarriage        Past Surgical History:   Past Surgical History:   Procedure Laterality Date    BONE GRAFT Left 7/13/2021    Procedure: BONE GRAFT;  Surgeon: Spenser Krishnamurthy MD;  Location: [facility] OR;  Service: Orthopedics;  Laterality: Left;  pro-dense    CORE DECOMPRESSION OF HEAD OF FEMUR Left 7/13/2021    Procedure: CORE DECOMPRESSION, FEMUR, HEAD;  Surgeon: Spenser Krishnamurthy MD;  Location: [facility] OR;  Service: Orthopedics;  Laterality: Left;    HIP ARTHROPLASTY Right 10/6/2021    Procedure: ARTHROPLASTY, HIP;  Surgeon: Spenser Krishnamurthy MD;  Location: [facility] OR;  Service: Orthopedics;  Laterality: Right;    INJECTION OF JOINT Right 7/12/2021    Procedure: right IA Hip Joint injection- per Dr Ruelas;  Surgeon: Juan David Ho MD;  Location: [facility] PAIN [facility];  Service: Pain Management;  Laterality: Right;       Medications:  Patient's Medications   New Prescriptions    No medications on file  "  Previous Medications    DICLOFENAC SODIUM (VOLTAREN) 1 % GEL    Apply 2 g topically 3 (three) times daily.    GABAPENTIN (NEURONTIN) 300 MG CAPSULE    Take 1 capsule (300 mg total) by mouth 2 (two) times daily.    MEGESTROL (MEGACE ES) 625 MG/5 ML (125 MG/ML) SUSP    daily as needed.     MELOXICAM (MOBIC) 15 MG TABLET    Take 1 tablet (15 mg total) by mouth once daily.    ONDANSETRON (ZOFRAN-ODT) 4 MG TBDL    Take 2 tablets (8 mg total) by mouth every 6 (six) hours as needed.    ONDANSETRON (ZOFRAN-ODT) 4 MG TBDL    Take 2 tablets (8 mg total) by mouth every 6 (six) hours as needed (Nausea).    OXYCODONE-ACETAMINOPHEN (PERCOCET)  MG PER TABLET    Take 1 tablet by mouth every 8 (eight) hours as needed for Pain.   Modified Medications    No medications on file   Discontinued Medications    No medications on file       Allergies: Review of patient's allergies indicates:  No Known Allergies    Social History:   Home town: [place], [place]  Occupation: [employer]  Alcohol use: She reports no history of alcohol use.  Tobacco use: She reports that she has never smoked. She has never used smokeless tobacco.    Review of systems:  History of recent illness, fevers, shakes, or chills: no  History of cardiac problems or chest pain: no  History of pulmonary problems or asthma: no  History of diabetes: no  History of prior dvt or clotting problems: no  History of sleep apnea: no      Physical Examination:  Estimated body mass index is 26.31 kg/m² as calculated from the following:    Height as of this encounter: 5' 7" (1.702 m).    Weight as of this encounter: 76.2 kg (168 lb).    General  Healthy appearing female in no acute distress  Alert and oriented, normal mood, appropriate affect    Shoulder Examination:  Patient is alert and oriented, no distress. Skin is intact. Neuro is normal with no focal motor or sensory findings.    Cervical exam is unremarkable. Intact cervical ROM. Negative Spurling's test    Physical " Exam:  RIGHT    LEFT    Scap Dyskinesis/Winging (-)    (-)    Tenderness:          Greater Tuberosity             (-)    (-)  Bicipital Groove  (-)    (-)  AC joint   (-)    (-)  Other:     ROM:  Forward Elevation 180    180  Abduction  130    130  ER (at side)  80    60  IR   T8    L1 with pain  Audible clicking heard throughout active shoulder range of motion.     Strength:   Supraspinatus  5/5    5/5  Infraspinatus  5/5    5/5  Subscap / IR  5/5    5/5     Special Tests:   Neer:    (-)    (-)   Jane:   (-)    +   SS Stress:   (-)    +   Bear Hug:   (-)    (-)   Sharp's:   (-)    (-)   Resisted Thrower's:   (-)    +   Cross Arm Abduction:  (-)    (-)    Neurovascular examination  - Motor grossly intact bilaterally to shoulder abduction, elbow flexion and extension, wrist flexion and extension, and intrinsic hand musculature  - Sensation intact to light touch bilaterally in axillary, median, radial, and ulnar distributions  - Symmetrical radial pulses      Imaging:  X-Ray Shoulder 2 or More views Bilateral  Narrative: EXAMINATION:  XR SHOULDER COMPLETE 2 OR MORE VIEWS BILATERAL    CLINICAL HISTORY:  Pain in left shoulder    TECHNIQUE:  Four view bilateral shoulders    COMPARISON:  None    FINDINGS:  Left shoulder: Mild flattening of the humeral head noted.  Possible faint sclerosis with linear subcortical lucency noted of the medial humeral head.  11 mm cystic focus within the inferior medial humeral head.  Glenohumeral joint space maintained.  AC joint tacked.  Left lung parenchyma clear.    Right shoulder: AC joint intact.  Glenohumeral joint space maintained.  Questionable faint sclerosis of the humeral head with linear subcortical lucency.  Right lung parenchyma clear.  Impression: Findings suggesting possible osteonecrosis (AVN) within the bilateral humeral heads more prevalent on the left.  Correlate with MRI imaging as needed.    Electronically signed by: Saul Rose  MD  Date:    05/03/2022  Time:    14:30       Physician Read: I agree with the above impression.      Impression:  26 y.o. female with bilateral shoulder avascular necrosis, left more advanced than right      Plan:  1. Discussed diagnosis and treatment options with patient today. She has bilateral shoulder avascular necrosis, left more advanced than right.  She is likely at stage III on the left and stage II on the right.  2. We discussed treatment options moving forward including core decompression versus resurfacing.  She would like to avoid any kind joint replacement if at all possible.  3. I would like to get MRIs of both shoulders to evaluate extent of avascular necrosis for preoperative planning.  4. MRI of bilateral shoulders for surgical planning.   5. Follow up after MRI.            Michael Ramirez MD    I, Flakito Sommer, acted as a scribe for Michael Ramirez MD for the duration of this office visit.

## 2022-05-03 NOTE — TELEPHONE ENCOUNTER
Called patient and scheduled procedure w/ Dr. Reveles on 5/18, fu also scheduled at this time for 6/15. Pt given instructions, including NPO after MN, and  necessity for duration of procedure. Instructed to call with any questions or concerns.

## 2022-05-17 ENCOUNTER — PATIENT MESSAGE (OUTPATIENT)
Dept: PAIN MEDICINE | Facility: HOSPITAL | Age: 27
End: 2022-05-17
Payer: MEDICAID

## 2022-05-20 ENCOUNTER — HOSPITAL ENCOUNTER (OUTPATIENT)
Dept: RADIOLOGY | Facility: HOSPITAL | Age: 27
Discharge: HOME | End: 2022-05-20
Attending: STUDENT IN AN ORGANIZED HEALTH CARE EDUCATION/TRAINING PROGRAM
Payer: MEDICAID

## 2022-05-20 DIAGNOSIS — G89.29 CHRONIC RIGHT SHOULDER PAIN: ICD-10-CM

## 2022-05-20 DIAGNOSIS — M25.511 CHRONIC RIGHT SHOULDER PAIN: ICD-10-CM

## 2022-05-20 DIAGNOSIS — M87.019 AVASCULAR NECROSIS OF BONE OF SHOULDER: ICD-10-CM

## 2022-05-20 DIAGNOSIS — G89.29 CHRONIC LEFT SHOULDER PAIN: ICD-10-CM

## 2022-05-20 DIAGNOSIS — M25.512 CHRONIC LEFT SHOULDER PAIN: ICD-10-CM

## 2022-05-20 PROCEDURE — 73221 MRI SHOULDER WITHOUT CONTRAST RIGHT: ICD-10-PCS | Mod: 26,RT,, | Performed by: RADIOLOGY

## 2022-05-20 PROCEDURE — 73221 MRI JOINT UPR EXTREM W/O DYE: CPT | Mod: 26,RT,, | Performed by: RADIOLOGY

## 2022-05-20 PROCEDURE — 73221 MRI JOINT UPR EXTREM W/O DYE: CPT | Mod: 26,LT,, | Performed by: RADIOLOGY

## 2022-05-20 PROCEDURE — 73221 MRI JOINT UPR EXTREM W/O DYE: CPT | Mod: TC,RT

## 2022-05-20 PROCEDURE — 73221 MRI JOINT UPR EXTREM W/O DYE: CPT | Mod: TC,LT

## 2022-05-20 PROCEDURE — 73221 MRI SHOULDER WITHOUT CONTRAST LEFT: ICD-10-PCS | Mod: 26,LT,, | Performed by: RADIOLOGY

## 2022-05-27 ENCOUNTER — OFFICE VISIT (OUTPATIENT)
Dept: ORTHOPEDICS | Facility: CLINIC | Age: 27
End: 2022-05-27
Payer: MEDICAID

## 2022-05-27 VITALS — WEIGHT: 168 LBS | BODY MASS INDEX: 26.37 KG/M2 | HEIGHT: 67 IN

## 2022-05-27 DIAGNOSIS — M25.512 CHRONIC LEFT SHOULDER PAIN: ICD-10-CM

## 2022-05-27 DIAGNOSIS — G89.29 CHRONIC LEFT SHOULDER PAIN: ICD-10-CM

## 2022-05-27 DIAGNOSIS — M87.019 AVASCULAR NECROSIS OF BONE OF SHOULDER: Primary | ICD-10-CM

## 2022-05-27 DIAGNOSIS — M25.511 CHRONIC RIGHT SHOULDER PAIN: ICD-10-CM

## 2022-05-27 DIAGNOSIS — G89.29 CHRONIC RIGHT SHOULDER PAIN: ICD-10-CM

## 2022-05-27 PROCEDURE — 99999 PR PBB SHADOW E&M-EST. PATIENT-LVL IV: CPT | Mod: PBBFAC,,, | Performed by: STUDENT IN AN ORGANIZED HEALTH CARE EDUCATION/TRAINING PROGRAM

## 2022-05-27 PROCEDURE — 1160F RVW MEDS BY RX/DR IN RCRD: CPT | Mod: CPTII,,, | Performed by: STUDENT IN AN ORGANIZED HEALTH CARE EDUCATION/TRAINING PROGRAM

## 2022-05-27 PROCEDURE — 1160F PR REVIEW ALL MEDS BY PRESCRIBER/CLIN PHARMACIST DOCUMENTED: ICD-10-PCS | Mod: CPTII,,, | Performed by: STUDENT IN AN ORGANIZED HEALTH CARE EDUCATION/TRAINING PROGRAM

## 2022-05-27 PROCEDURE — 3008F BODY MASS INDEX DOCD: CPT | Mod: CPTII,,, | Performed by: STUDENT IN AN ORGANIZED HEALTH CARE EDUCATION/TRAINING PROGRAM

## 2022-05-27 PROCEDURE — 99999 PR PBB SHADOW E&M-EST. PATIENT-LVL IV: ICD-10-PCS | Mod: PBBFAC,,, | Performed by: STUDENT IN AN ORGANIZED HEALTH CARE EDUCATION/TRAINING PROGRAM

## 2022-05-27 PROCEDURE — 99214 OFFICE O/P EST MOD 30 MIN: CPT | Mod: PBBFAC | Performed by: STUDENT IN AN ORGANIZED HEALTH CARE EDUCATION/TRAINING PROGRAM

## 2022-05-27 PROCEDURE — 99214 OFFICE O/P EST MOD 30 MIN: CPT | Mod: S$PBB,,, | Performed by: STUDENT IN AN ORGANIZED HEALTH CARE EDUCATION/TRAINING PROGRAM

## 2022-05-27 PROCEDURE — 99214 PR OFFICE/OUTPT VISIT, EST, LEVL IV, 30-39 MIN: ICD-10-PCS | Mod: S$PBB,,, | Performed by: STUDENT IN AN ORGANIZED HEALTH CARE EDUCATION/TRAINING PROGRAM

## 2022-05-27 PROCEDURE — 3008F PR BODY MASS INDEX (BMI) DOCUMENTED: ICD-10-PCS | Mod: CPTII,,, | Performed by: STUDENT IN AN ORGANIZED HEALTH CARE EDUCATION/TRAINING PROGRAM

## 2022-05-27 PROCEDURE — 1159F PR MEDICATION LIST DOCUMENTED IN MEDICAL RECORD: ICD-10-PCS | Mod: CPTII,,, | Performed by: STUDENT IN AN ORGANIZED HEALTH CARE EDUCATION/TRAINING PROGRAM

## 2022-05-27 PROCEDURE — 1159F MED LIST DOCD IN RCRD: CPT | Mod: CPTII,,, | Performed by: STUDENT IN AN ORGANIZED HEALTH CARE EDUCATION/TRAINING PROGRAM

## 2022-05-27 NOTE — PROGRESS NOTES
Orthopaedic Follow-Up Visit    Last Appointment: 5/3/22  Diagnosis: Bilateral shoulder avascular necrosis, left more advanced than right  Prior Procedure: MRI    Miriam Jett is a 26 y.o. female who is here for f/u evaluation of bilateral shoulder avascular necrosis. The patient was last seen here by me on 5/3/22 at which point we decided to send her for MRIs of both shoulders prior to considering further treatment options. The patient returns today reporting that the symptoms have not changed and is interested in proceeding with further treatment options.     To review her history, Miriam Jett is a 26 y.o. right-hand dominant female who presents with LEFT greater than right shoulder pain and dysfunction. Onset of the symptoms was 10/2021. There was no specific injury, just a gradual onset of symptoms.  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  She has had a right total hip arthroplasty and a left hip core decompression.  There is no clear reason why she has gotten avascular necrosis of her hips. She has tried rest, activity modification, and OTC medication with no relief.     Patient's medications, allergies, past medical, surgical, social and family histories were reviewed and updated as appropriate.    Review of Systems   All systems reviewed were negative.  Specifically, the patient denies fever, chills, weight loss, chest pain, shortness of breath, or dyspnea on exertion.      Past Medical History:   Diagnosis Date    Miscarriage        Past Surgical History:   Procedure Laterality Date    BONE GRAFT Left 7/13/2021    Procedure: BONE GRAFT;  Surgeon: Spenser Krishnamurthy MD;  Location: [facility] OR;  Service: Orthopedics;  Laterality: Left;  pro-dense    CORE DECOMPRESSION OF HEAD OF FEMUR Left 7/13/2021    Procedure: CORE DECOMPRESSION, FEMUR, HEAD;  Surgeon: Spenser Krishnamurthy MD;  Location: [facility] OR;  Service: Orthopedics;  Laterality: Left;    HIP  ARTHROPLASTY Right 10/6/2021    Procedure: ARTHROPLASTY, HIP;  Surgeon: Spenser Krishnamurthy MD;  Location: [facility] OR;  Service: Orthopedics;  Laterality: Right;    INJECTION OF JOINT Right 7/12/2021    Procedure: right IA Hip Joint injection- per Dr Ruelas;  Surgeon: Juan David Ho MD;  Location: [facility];  Service: Pain Management;  Laterality: Right;       Patient's Medications   New Prescriptions    No medications on file   Previous Medications    DICLOFENAC SODIUM (VOLTAREN) 1 % GEL    Apply 2 g topically 3 (three) times daily.    GABAPENTIN (NEURONTIN) 300 MG CAPSULE    Take 1 capsule (300 mg total) by mouth 2 (two) times daily.    MEGESTROL (MEGACE ES) 625 MG/5 ML (125 MG/ML) SUSP    daily as needed.     MELOXICAM (MOBIC) 15 MG TABLET    Take 1 tablet (15 mg total) by mouth once daily.    ONDANSETRON (ZOFRAN-ODT) 4 MG TBDL    Take 2 tablets (8 mg total) by mouth every 6 (six) hours as needed.    ONDANSETRON (ZOFRAN-ODT) 4 MG TBDL    Take 2 tablets (8 mg total) by mouth every 6 (six) hours as needed (Nausea).    OXYCODONE-ACETAMINOPHEN (PERCOCET)  MG PER TABLET    Take 1 tablet by mouth every 8 (eight) hours as needed for Pain.   Modified Medications    No medications on file   Discontinued Medications    No medications on file       Family History   Problem Relation Age of Onset    Birth defects Neg Hx        Review of patient's allergies indicates:  No Known Allergies      Objective:      Physical Exam  Patient is alert and oriented, no distress. Skin is intact. Neuro is normal with no focal motor or sensory findings.    Shoulder Examination:  Patient is alert and oriented, no distress. Skin is intact. Neuro is normal with no focal motor or sensory findings.     Cervical exam is unremarkable. Intact cervical ROM. Negative Spurling's test     Physical Exam:                       RIGHT                                     LEFT     Scap Dyskinesis/Winging       (-)                                              (-)     Tenderness:                                                                              Greater Tuberosity                  (-)                                            (-)  Bicipital Groove                       (-)                                             (-)  AC joint                                   (-)                                             (-)  Other:      ROM:  Forward Elevation       180                                          180  Abduction                    130                                          130  ER (at side)                 80                                            60  IR                                 T8                                            L1 with pain  Audible clicking heard throughout active shoulder range of motion.      Strength:   Supraspinatus             5/5                                           5/5  Infraspinatus               5/5                                           5/5  Subscap / IR               5/5                                           5/5      Special Tests:              Neer:                                       (-)                                             (-)              Lucinda:                                 (-)                                             +              SS Stress:                               (-)                                             +              Bear Hug:                                (-)                                             (-)              Taliaferro's:                                 (-)                                             (-)              Resisted Thrower's:                (-)                                             +              Cross Arm Abduction:             (-)                                             (-)     Neurovascular examination  - Motor grossly intact bilaterally to shoulder abduction, elbow flexion and extension, wrist flexion and extension, and intrinsic  hand musculature  - Sensation intact to light touch bilaterally in axillary, median, radial, and ulnar distributions  - Symmetrical radial pulses      Imaging:    MRI Shoulder Without Contrast Left  Narrative: EXAMINATION:  MRI SHOULDER WITHOUT CONTRAST RIGHT; MRI SHOULDER WITHOUT CONTRAST LEFT    CLINICAL HISTORY:  avascular necrosis;; avascular necrosis.;  Idiopathic aseptic necrosis of unspecified shoulder    TECHNIQUE:  Multiplanar, multisequence MR images were performed of both shoulders.  No intravenous contrast was administered.    COMPARISON:  Shoulder radiographs May 3, 2022    FINDINGS:  Right shoulder:    Type 2 acromion is present. No subacromial spur. Acromioclavicular joint is normal. No fluid within subacromial-subdeltoid bursa.    No rotator cuff tendinopathy.No supraspinatus or infraspinatus tendon tear. No teres minor tendon tear. No subscapularis tendon tear.    No rotator cuff muscle edema or fatty atrophy. Rotator cuff muscle bulk is normal.    Heterogeneous serpiginous marrow signal is seen involving the right humeral head, most pronounced along the anterior aspect of the humeral head and humeral head articular surface, consistent with underlying humeral head avascular necrosis.  Approximately 30-40% of the right glenohumeral articular surface is involved by this avascular necrosis.  Subtle cortical step-off/irregularity along the superior aspect of the glenohumeral articular surface correlating with findings on the prior radiograph (series 8, image 9).  Marrow signal and articular surface of the glenoid appears normal.    Long head biceps tendon is intact.    No glenohumeral joint effusion.    Left shoulder:    Type 2 acromion is present.  No subacromial spur.  Acromioclavicular joint is normal.  No fluid within the subacromial subdeltoid bursa.  There is a small 6 mm ganglion cyst along the undersurface of the acromion (series 7, image 16).    No rotator cuff tendinopathy.  No supraspinatus  or infraspinatus tendon tear.  No teres minor tendon tear.  No subscapularis tendon tear.    No rotator cuff muscle edema or fatty atrophy.  Rotator cuff muscle bulk is normal.    Heterogeneous serpiginous marrow signal is also seen involving the left humeral head indicative of underlying humeral head avascular necrosis.  Findings within the left humeral head are more pronounced compared to the right and involve approximately 50-60% of the glenohumeral head articular surface.  These findings are most severe along the superior aspect of the humeral head articular surface with areas of cortical irregularity and flattening correlating with findings on the prior radiograph (series 6, images 9 through 12).  There is associated mild partial-thickness chondrosis involving the glenoid articular surface without abnormality of the glenoid marrow signal.    Long head biceps tendon is intact.    No glenohumeral joint effusion.  Impression: Bilateral humeral head avascular necrosis with associated articular surface irregularity, greater on the left than right.    Electronically signed by: Benson Santos  Date:    05/22/2022  Time:    18:26  MRI Shoulder Without Contrast Right  Narrative: EXAMINATION:  MRI SHOULDER WITHOUT CONTRAST RIGHT; MRI SHOULDER WITHOUT CONTRAST LEFT    CLINICAL HISTORY:  avascular necrosis;; avascular necrosis.;  Idiopathic aseptic necrosis of unspecified shoulder    TECHNIQUE:  Multiplanar, multisequence MR images were performed of both shoulders.  No intravenous contrast was administered.    COMPARISON:  Shoulder radiographs May 3, 2022    FINDINGS:  Right shoulder:    Type 2 acromion is present. No subacromial spur. Acromioclavicular joint is normal. No fluid within subacromial-subdeltoid bursa.    No rotator cuff tendinopathy.No supraspinatus or infraspinatus tendon tear. No teres minor tendon tear. No subscapularis tendon tear.    No rotator cuff muscle edema or fatty atrophy. Rotator cuff muscle  bulk is normal.    Heterogeneous serpiginous marrow signal is seen involving the right humeral head, most pronounced along the anterior aspect of the humeral head and humeral head articular surface, consistent with underlying humeral head avascular necrosis.  Approximately 30-40% of the right glenohumeral articular surface is involved by this avascular necrosis.  Subtle cortical step-off/irregularity along the superior aspect of the glenohumeral articular surface correlating with findings on the prior radiograph (series 8, image 9).  Marrow signal and articular surface of the glenoid appears normal.    Long head biceps tendon is intact.    No glenohumeral joint effusion.    Left shoulder:    Type 2 acromion is present.  No subacromial spur.  Acromioclavicular joint is normal.  No fluid within the subacromial subdeltoid bursa.  There is a small 6 mm ganglion cyst along the undersurface of the acromion (series 7, image 16).    No rotator cuff tendinopathy.  No supraspinatus or infraspinatus tendon tear.  No teres minor tendon tear.  No subscapularis tendon tear.    No rotator cuff muscle edema or fatty atrophy.  Rotator cuff muscle bulk is normal.    Heterogeneous serpiginous marrow signal is also seen involving the left humeral head indicative of underlying humeral head avascular necrosis.  Findings within the left humeral head are more pronounced compared to the right and involve approximately 50-60% of the glenohumeral head articular surface.  These findings are most severe along the superior aspect of the humeral head articular surface with areas of cortical irregularity and flattening correlating with findings on the prior radiograph (series 6, images 9 through 12).  There is associated mild partial-thickness chondrosis involving the glenoid articular surface without abnormality of the glenoid marrow signal.    Long head biceps tendon is intact.    No glenohumeral joint effusion.  Impression: Bilateral humeral  head avascular necrosis with associated articular surface irregularity, greater on the left than right.    Electronically signed by: Benson Santos  Date:    05/22/2022  Time:    18:26       Physician read: I agree with the above impression.    Assessment/Plan:   Miriam Jett is a 26 y.o. female with left and right shoulder avascular necrosis, left more advanced than right    Plan:    1. Discussed diagnosis and treatment options with her mom today.  She has advanced avascular necrosis of her bilateral humeral heads, left greater than right.  2. She has subchondral collapse as well as cortical defect of the humeral heads.  Approximately 60% of her humeral head is involved the left side.  3. We discussed further treatment options including core decompression as well as resurfacing of the humeral head.  As she has significant subchondral collapse involving 60% of her humeral head, I think moving forward with resurfacing is the best treatment for her.  4. We discussed all the risks, benefits, limitations, alternatives of humeral head resurfacing today.  We discussed the postop rehab and recovery protocol.  Despite the risks, she elected to proceed for with the surgery and the consent was freely signed.  5. We will proceed with left shoulder hemiarthroplasty with humeral head resurfacing.  6. Follow up with me 10-14 days after surgery          Michael Ramirez MD    I, Flakito Sommer, acted as a scribe for Michael Ramirez MD for the duration of this office visit.

## 2022-05-27 NOTE — H&P (VIEW-ONLY)
Orthopaedic Follow-Up Visit    Last Appointment: 5/3/22  Diagnosis: Bilateral shoulder avascular necrosis, left more advanced than right  Prior Procedure: MRI    Miriam Jett is a 26 y.o. female who is here for f/u evaluation of bilateral shoulder avascular necrosis. The patient was last seen here by me on 5/3/22 at which point we decided to send her for MRIs of both shoulders prior to considering further treatment options. The patient returns today reporting that the symptoms have not changed and is interested in proceeding with further treatment options.     To review her history, Miriam Jett is a 26 y.o. right-hand dominant female who presents with LEFT greater than right shoulder pain and dysfunction. Onset of the symptoms was 10/2021. There was no specific injury, just a gradual onset of symptoms.  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  She has had a right total hip arthroplasty and a left hip core decompression.  There is no clear reason why she has gotten avascular necrosis of her hips. She has tried rest, activity modification, and OTC medication with no relief.     Patient's medications, allergies, past medical, surgical, social and family histories were reviewed and updated as appropriate.    Review of Systems   All systems reviewed were negative.  Specifically, the patient denies fever, chills, weight loss, chest pain, shortness of breath, or dyspnea on exertion.      Past Medical History:   Diagnosis Date    Miscarriage        Past Surgical History:   Procedure Laterality Date    BONE GRAFT Left 7/13/2021    Procedure: BONE GRAFT;  Surgeon: Spenser Krishnamurthy MD;  Location: [facility] OR;  Service: Orthopedics;  Laterality: Left;  pro-dense    CORE DECOMPRESSION OF HEAD OF FEMUR Left 7/13/2021    Procedure: CORE DECOMPRESSION, FEMUR, HEAD;  Surgeon: Spenser Krishnamurthy MD;  Location: [facility] OR;  Service: Orthopedics;  Laterality: Left;    HIP  ARTHROPLASTY Right 10/6/2021    Procedure: ARTHROPLASTY, HIP;  Surgeon: Spenser Krishnamurthy MD;  Location: [facility] OR;  Service: Orthopedics;  Laterality: Right;    INJECTION OF JOINT Right 7/12/2021    Procedure: right IA Hip Joint injection- per Dr Ruelas;  Surgeon: Juan David Ho MD;  Location: [facility] PAIN [facility]GT;  Service: Pain Management;  Laterality: Right;       Patient's Medications   New Prescriptions    No medications on file   Previous Medications    DICLOFENAC SODIUM (VOLTAREN) 1 % GEL    Apply 2 g topically 3 (three) times daily.    GABAPENTIN (NEURONTIN) 300 MG CAPSULE    Take 1 capsule (300 mg total) by mouth 2 (two) times daily.    MEGESTROL (MEGACE ES) 625 MG/5 ML (125 MG/ML) SUSP    daily as needed.     MELOXICAM (MOBIC) 15 MG TABLET    Take 1 tablet (15 mg total) by mouth once daily.    ONDANSETRON (ZOFRAN-ODT) 4 MG TBDL    Take 2 tablets (8 mg total) by mouth every 6 (six) hours as needed.    ONDANSETRON (ZOFRAN-ODT) 4 MG TBDL    Take 2 tablets (8 mg total) by mouth every 6 (six) hours as needed (Nausea).    OXYCODONE-ACETAMINOPHEN (PERCOCET)  MG PER TABLET    Take 1 tablet by mouth every 8 (eight) hours as needed for Pain.   Modified Medications    No medications on file   Discontinued Medications    No medications on file       Family History   Problem Relation Age of Onset    Birth defects Neg Hx        Review of patient's allergies indicates:  No Known Allergies      Objective:      Physical Exam  Patient is alert and oriented, no distress. Skin is intact. Neuro is normal with no focal motor or sensory findings.    Shoulder Examination:  Patient is alert and oriented, no distress. Skin is intact. Neuro is normal with no focal motor or sensory findings.     Cervical exam is unremarkable. Intact cervical ROM. Negative Spurling's test     Physical Exam:                       RIGHT                                     LEFT     Scap Dyskinesis/Winging       (-)                                              (-)     Tenderness:                                                                              Greater Tuberosity                  (-)                                            (-)  Bicipital Groove                       (-)                                             (-)  AC joint                                   (-)                                             (-)  Other:      ROM:  Forward Elevation       180                                          180  Abduction                    130                                          130  ER (at side)                 80                                            60  IR                                 T8                                            L1 with pain  Audible clicking heard throughout active shoulder range of motion.      Strength:   Supraspinatus             5/5                                           5/5  Infraspinatus               5/5                                           5/5  Subscap / IR               5/5                                           5/5      Special Tests:              Neer:                                       (-)                                             (-)              Lucinda:                                 (-)                                             +              SS Stress:                               (-)                                             +              Bear Hug:                                (-)                                             (-)              [place]'s:                                 (-)                                             (-)              Resisted Thrower's:                (-)                                             +              Cross Arm Abduction:             (-)                                             (-)     Neurovascular examination  - Motor grossly intact bilaterally to shoulder abduction, elbow flexion and extension, wrist flexion and extension, and intrinsic  hand musculature  - Sensation intact to light touch bilaterally in axillary, median, radial, and ulnar distributions  - Symmetrical radial pulses      Imaging:    MRI Shoulder Without Contrast Left  Narrative: EXAMINATION:  MRI SHOULDER WITHOUT CONTRAST RIGHT; MRI SHOULDER WITHOUT CONTRAST LEFT    CLINICAL HISTORY:  avascular necrosis;; avascular necrosis.;  Idiopathic aseptic necrosis of unspecified shoulder    TECHNIQUE:  Multiplanar, multisequence MR images were performed of both shoulders.  No intravenous contrast was administered.    COMPARISON:  Shoulder radiographs May 3, 2022    FINDINGS:  Right shoulder:    Type 2 acromion is present. No subacromial spur. Acromioclavicular joint is normal. No fluid within subacromial-subdeltoid bursa.    No rotator cuff tendinopathy.No supraspinatus or infraspinatus tendon tear. No teres minor tendon tear. No subscapularis tendon tear.    No rotator cuff muscle edema or fatty atrophy. Rotator cuff muscle bulk is normal.    Heterogeneous serpiginous marrow signal is seen involving the right humeral head, most pronounced along the anterior aspect of the humeral head and humeral head articular surface, consistent with underlying humeral head avascular necrosis.  Approximately 30-40% of the right glenohumeral articular surface is involved by this avascular necrosis.  Subtle cortical step-off/irregularity along the superior aspect of the glenohumeral articular surface correlating with findings on the prior radiograph (series 8, image 9).  Marrow signal and articular surface of the glenoid appears normal.    Long head biceps tendon is intact.    No glenohumeral joint effusion.    Left shoulder:    Type 2 acromion is present.  No subacromial spur.  Acromioclavicular joint is normal.  No fluid within the subacromial subdeltoid bursa.  There is a small 6 mm ganglion cyst along the undersurface of the acromion (series 7, image 16).    No rotator cuff tendinopathy.  No supraspinatus  or infraspinatus tendon tear.  No teres minor tendon tear.  No subscapularis tendon tear.    No rotator cuff muscle edema or fatty atrophy.  Rotator cuff muscle bulk is normal.    Heterogeneous serpiginous marrow signal is also seen involving the left humeral head indicative of underlying humeral head avascular necrosis.  Findings within the left humeral head are more pronounced compared to the right and involve approximately 50-60% of the glenohumeral head articular surface.  These findings are most severe along the superior aspect of the humeral head articular surface with areas of cortical irregularity and flattening correlating with findings on the prior radiograph (series 6, images 9 through 12).  There is associated mild partial-thickness chondrosis involving the glenoid articular surface without abnormality of the glenoid marrow signal.    Long head biceps tendon is intact.    No glenohumeral joint effusion.  Impression: Bilateral humeral head avascular necrosis with associated articular surface irregularity, greater on the left than right.    Electronically signed by: Benson Santos  Date:    05/22/2022  Time:    18:26  MRI Shoulder Without Contrast Right  Narrative: EXAMINATION:  MRI SHOULDER WITHOUT CONTRAST RIGHT; MRI SHOULDER WITHOUT CONTRAST LEFT    CLINICAL HISTORY:  avascular necrosis;; avascular necrosis.;  Idiopathic aseptic necrosis of unspecified shoulder    TECHNIQUE:  Multiplanar, multisequence MR images were performed of both shoulders.  No intravenous contrast was administered.    COMPARISON:  Shoulder radiographs May 3, 2022    FINDINGS:  Right shoulder:    Type 2 acromion is present. No subacromial spur. Acromioclavicular joint is normal. No fluid within subacromial-subdeltoid bursa.    No rotator cuff tendinopathy.No supraspinatus or infraspinatus tendon tear. No teres minor tendon tear. No subscapularis tendon tear.    No rotator cuff muscle edema or fatty atrophy. Rotator cuff muscle  bulk is normal.    Heterogeneous serpiginous marrow signal is seen involving the right humeral head, most pronounced along the anterior aspect of the humeral head and humeral head articular surface, consistent with underlying humeral head avascular necrosis.  Approximately 30-40% of the right glenohumeral articular surface is involved by this avascular necrosis.  Subtle cortical step-off/irregularity along the superior aspect of the glenohumeral articular surface correlating with findings on the prior radiograph (series 8, image 9).  Marrow signal and articular surface of the glenoid appears normal.    Long head biceps tendon is intact.    No glenohumeral joint effusion.    Left shoulder:    Type 2 acromion is present.  No subacromial spur.  Acromioclavicular joint is normal.  No fluid within the subacromial subdeltoid bursa.  There is a small 6 mm ganglion cyst along the undersurface of the acromion (series 7, image 16).    No rotator cuff tendinopathy.  No supraspinatus or infraspinatus tendon tear.  No teres minor tendon tear.  No subscapularis tendon tear.    No rotator cuff muscle edema or fatty atrophy.  Rotator cuff muscle bulk is normal.    Heterogeneous serpiginous marrow signal is also seen involving the left humeral head indicative of underlying humeral head avascular necrosis.  Findings within the left humeral head are more pronounced compared to the right and involve approximately 50-60% of the glenohumeral head articular surface.  These findings are most severe along the superior aspect of the humeral head articular surface with areas of cortical irregularity and flattening correlating with findings on the prior radiograph (series 6, images 9 through 12).  There is associated mild partial-thickness chondrosis involving the glenoid articular surface without abnormality of the glenoid marrow signal.    Long head biceps tendon is intact.    No glenohumeral joint effusion.  Impression: Bilateral humeral  head avascular necrosis with associated articular surface irregularity, greater on the left than right.    Electronically signed by: Benson Santos  Date:    05/22/2022  Time:    18:26       Physician read: I agree with the above impression.    Assessment/Plan:   Miriam Jett is a 26 y.o. female with left and right shoulder avascular necrosis, left more advanced than right    Plan:    1. Discussed diagnosis and treatment options with her mom today.  She has advanced avascular necrosis of her bilateral humeral heads, left greater than right.  2. She has subchondral collapse as well as cortical defect of the humeral heads.  Approximately 60% of her humeral head is involved the left side.  3. We discussed further treatment options including core decompression as well as resurfacing of the humeral head.  As she has significant subchondral collapse involving 60% of her humeral head, I think moving forward with resurfacing is the best treatment for her.  4. We discussed all the risks, benefits, limitations, alternatives of humeral head resurfacing today.  We discussed the postop rehab and recovery protocol.  Despite the risks, she elected to proceed for with the surgery and the consent was freely signed.  5. We will proceed with left shoulder hemiarthroplasty with humeral head resurfacing.  6. Follow up with me 10-14 days after surgery          Michael Ramirez MD    I, Flakito Sommer, acted as a scribe for Michael Ramirez MD for the duration of this office visit.

## 2022-05-30 ENCOUNTER — TELEPHONE (OUTPATIENT)
Dept: HEMATOLOGY/ONCOLOGY | Facility: CLINIC | Age: 27
End: 2022-05-30
Payer: MEDICAID

## 2022-05-30 ENCOUNTER — PATIENT MESSAGE (OUTPATIENT)
Dept: HEMATOLOGY/ONCOLOGY | Facility: CLINIC | Age: 27
End: 2022-05-30
Payer: MEDICAID

## 2022-05-30 NOTE — TELEPHONE ENCOUNTER
Attempted to call patient in reference to Hematology referral from Dr. Ramirez. No answer v/m not setup.  Frageggt message sent.

## 2022-05-31 ENCOUNTER — TELEPHONE (OUTPATIENT)
Dept: HEMATOLOGY/ONCOLOGY | Facility: CLINIC | Age: 27
End: 2022-05-31
Payer: MEDICAID

## 2022-05-31 NOTE — TELEPHONE ENCOUNTER
----- Message from Anais Anderson LPN sent at 5/31/2022  1:07 PM CDT -----  Contact: Miriam nolan [phone]    ----- Message -----  From: Maddy Vaughn  Sent: 5/31/2022  12:55 PM CDT  To: [facility] Hematology Oncology Clinical Support    1MEDICALADVICE     Patient is calling for Medical Advice regarding:    How long has patient had these symptoms:    Pharmacy name and phone#:    Would like response via Teramindt: call back    Comments: Pt is requesting a call back from the nurse to make an appt because she has a referral in the system for: Avascular necrosis of bone of shoulder [M87.019

## 2022-06-01 ENCOUNTER — TELEPHONE (OUTPATIENT)
Dept: ORTHOPEDICS | Facility: CLINIC | Age: 27
End: 2022-06-01
Payer: MEDICAID

## 2022-06-01 DIAGNOSIS — M87.019 AVASCULAR NECROSIS OF BONE OF SHOULDER: Primary | ICD-10-CM

## 2022-06-01 DIAGNOSIS — G89.29 CHRONIC LEFT SHOULDER PAIN: ICD-10-CM

## 2022-06-01 DIAGNOSIS — M25.512 CHRONIC LEFT SHOULDER PAIN: ICD-10-CM

## 2022-06-01 NOTE — TELEPHONE ENCOUNTER
Called patient regarding post-op physical therapy and patient requested to schedule with [facility]. Staff was notified

## 2022-06-13 ENCOUNTER — TELEPHONE (OUTPATIENT)
Dept: PREADMISSION TESTING | Facility: HOSPITAL | Age: 27
End: 2022-06-13
Payer: MEDICAID

## 2022-06-13 NOTE — TELEPHONE ENCOUNTER
Pre op instructions reviewed with patient per phone.    Spoke about pre op process and surgery instructions, verbalized understanding.    To confirm, Your surgeon has instructed you:  Surgery is scheduled on 6/16/22.      Please report to [facility], 1st floor.    The address is [address].  [place]  [phone]       The Pre Admissions will call you the day prior to surgery with your arrival time.        INSTRUCTIONS IMPORTANT!!!  Do Not Eat, Drink, or Smoke after 12 midnight! NO WATER after midnight! OK to brush teeth, no gum, candy or mints!        *Take only these medicines with a small swallow of water-morning of surgery.    None        ____  Do Not wear makeup, mascara nail polish or artificial nails  ____  NO powder, lotions, deodorants or creams to surgical area.  ____  Please remove all jewelry, including piercings and leave at home.  SURGERY WILL BE CANCELLED IF PIERCINGS ARE PRESENT!!!  ____  Dentures, Hearing Aids and Contact Lens will need to be removed prior to the start of surgery.  ____  Please bring photo ID and insurance information to hospital (Leave Valuables at Home)  ____  If going home the same day, arrange for a ride home. You will not be able to            drive if Anesthesia was used.    ____  Wear clean, loose fitting clothing. Allow for dressings, bandages.  ____  Stop Aspirin, Ibuprofen, Motrin and Aleve at least 5-7 days before surgery, unless otherwise instructed by your doctor, or the nurse. You MAY use Tylenol/acetaminophen until day of               surgery.  ____  If you take diabetic medication, do NOT take morning of surgery unless instructed by            Doctor. Metformin to be stopped 24 hrs prior to surgery time.   ____ Stop taking any Fish Oil supplements or Vitamins at least 5 days prior to surgery, unless instructed otherwise by your Doctor.         Bathing Instructions: The night before surgery and the morning prior to  coming to the hospital:              -Do not shave your face.  -Do not shave pubic hair 7 days prior to surgery (gyn pt's).  -Do not shave legs/underarms 3 days prior to surgery.              -Shower & Rinse your body as usual with anti-bacterial Soap (Dial, Lever 2000, or Hibiclens)              -Do not use hibiclens on your head, face, or genitals.              -Do not wash with anti-bacterial soap after you use the hibiclens.              -Rinse your body thoroughly.       Pediatric patients do not need to use anti-bacterial soap or Hibiclens.            [facility] Visitor/Ride Policy:  Only 1 adult allowed (over the age of 18) to accompany you into Pre-op/Recovery Surgery Dept and must stay through the entire length of admission.    Must have a ride home from a responsible adult that you know and trust.   Pediatric Patients are allowed 2 adult visitors.    Medical Transport, Uber or Lyft can only be used if patient has a responsible adult to accompany them during ride home.     Post-Op Instructions: You will receive surgery post-op instructions by your Discharge Nurse prior to going home.     Surgical Site Infection:     Prevention of surgical site infections:                 -Keep incisions clean and dry.              -Do not soak/submerge incisions in water until completely healed.              -Do not apply lotions, powders, creams, or deodorants to site.              -Always make sure hands are cleaned with antibacterial soap/ alcohol-based   prior to touching the surgical site.       Signs and symptoms:              -Redness and pain around the area where you had surgery              -Drainage of cloudy fluid from your surgical wound              -Fever over 100.4 or chills  >>>Call Surgeon office/on-call Surgeon if you experience any of these signs & symptoms post-surgery.        *Please Call [facility] Pre-Admissions Department with surgery instruction questions at [phone].     *Insurance  Questions, please call [phone].    Pre admit office to call afternoon prior to surgery with final arrival time.

## 2022-06-14 ENCOUNTER — ANESTHESIA EVENT (OUTPATIENT)
Dept: SURGERY | Facility: HOSPITAL | Age: 27
End: 2022-06-14
Payer: MEDICAID

## 2022-06-14 NOTE — ANESTHESIA PREPROCEDURE EVALUATION
06/14/2022  Miriam Jett is a 26 y.o., female.      Pre-op Assessment    I have reviewed the Patient Summary Reports.     I have reviewed the Nursing Notes. I have reviewed the NPO Status.   I have reviewed the Medications.     Review of Systems  Anesthesia Hx:  No problems with previous Anesthesia  Denies Family Hx of Anesthesia complications.   Denies Personal Hx of Anesthesia complications.   Social:  Non-Smoker    Hematology/Oncology:  Hematology Normal        Cardiovascular:  Cardiovascular Normal     Pulmonary:  Pulmonary Normal    Renal/:  Renal/ Normal     Hepatic/GI:  Hepatic/GI Normal    Musculoskeletal:   Avascular necrosis bilat hips, s/p THR.   Avascular necrosis bilat shoulder, l greater.   Neurological:  Neurology Normal    Psych:  Psychiatric Normal           Physical Exam  General: Alert    Airway:  Mallampati: II   Mouth Opening: Normal  TM Distance: Normal  Tongue: Normal  Neck ROM: Normal ROM    Dental:  Intact, Braces    Chest/Lungs:  Clear to auscultation, Normal Respiratory Rate    Heart:  Rate: Normal  Rhythm: Regular Rhythm        Anesthesia Plan  Type of Anesthesia, risks & benefits discussed:    Anesthesia Type: Gen ETT  Intra-op Monitoring Plan: Standard ASA Monitors  Post Op Pain Control Plan: multimodal analgesia, IV/PO Opioids PRN and peripheral nerve block  Induction:  IV  Informed Consent: Informed consent signed with the Patient and all parties understand the risks and agree with anesthesia plan.  All questions answered.   ASA Score: 2  Day of Surgery Review of History & Physical: H&P Update referred to the surgeon/provider.    Ready For Surgery From Anesthesia Perspective.     .

## 2022-06-15 ENCOUNTER — LAB VISIT (OUTPATIENT)
Dept: LAB | Facility: HOSPITAL | Age: 27
End: 2022-06-15
Attending: NURSE PRACTITIONER
Payer: MEDICAID

## 2022-06-15 ENCOUNTER — OFFICE VISIT (OUTPATIENT)
Dept: HEMATOLOGY/ONCOLOGY | Facility: CLINIC | Age: 27
End: 2022-06-15
Payer: MEDICAID

## 2022-06-15 ENCOUNTER — TELEPHONE (OUTPATIENT)
Dept: PREADMISSION TESTING | Facility: HOSPITAL | Age: 27
End: 2022-06-15
Payer: MEDICAID

## 2022-06-15 ENCOUNTER — PATIENT MESSAGE (OUTPATIENT)
Dept: HEMATOLOGY/ONCOLOGY | Facility: CLINIC | Age: 27
End: 2022-06-15

## 2022-06-15 ENCOUNTER — PATIENT MESSAGE (OUTPATIENT)
Dept: HEMATOLOGY/ONCOLOGY | Facility: CLINIC | Age: 27
End: 2022-06-15
Payer: MEDICAID

## 2022-06-15 DIAGNOSIS — M87.019 AVASCULAR NECROSIS OF BONE OF SHOULDER: ICD-10-CM

## 2022-06-15 DIAGNOSIS — M87.052 AVASCULAR NECROSIS OF BONE OF LEFT HIP: ICD-10-CM

## 2022-06-15 DIAGNOSIS — M87.051 AVASCULAR NECROSIS OF BONE OF RIGHT HIP: Primary | ICD-10-CM

## 2022-06-15 DIAGNOSIS — E75.22 GAUCHER DISEASE: ICD-10-CM

## 2022-06-15 LAB
BASOPHILS # BLD AUTO: 0.06 K/UL (ref 0–0.2)
BASOPHILS NFR BLD: 0.6 % (ref 0–1.9)
DIFFERENTIAL METHOD: ABNORMAL
EOSINOPHIL # BLD AUTO: 0 K/UL (ref 0–0.5)
EOSINOPHIL NFR BLD: 0.4 % (ref 0–8)
ERYTHROCYTE [DISTWIDTH] IN BLOOD BY AUTOMATED COUNT: 13.7 % (ref 11.5–14.5)
HCT VFR BLD AUTO: 36 % (ref 37–48.5)
HGB BLD-MCNC: 12 G/DL (ref 12–16)
IMM GRANULOCYTES # BLD AUTO: 0.04 K/UL (ref 0–0.04)
IMM GRANULOCYTES NFR BLD AUTO: 0.4 % (ref 0–0.5)
LYMPHOCYTES # BLD AUTO: 3.2 K/UL (ref 1–4.8)
LYMPHOCYTES NFR BLD: 30.7 % (ref 18–48)
MCH RBC QN AUTO: 29.7 PG (ref 27–31)
MCHC RBC AUTO-ENTMCNC: 33.3 G/DL (ref 32–36)
MCV RBC AUTO: 89 FL (ref 82–98)
MONOCYTES # BLD AUTO: 0.9 K/UL (ref 0.3–1)
MONOCYTES NFR BLD: 8.8 % (ref 4–15)
NEUTROPHILS # BLD AUTO: 6.1 K/UL (ref 1.8–7.7)
NEUTROPHILS NFR BLD: 59.1 % (ref 38–73)
NRBC BLD-RTO: 0 /100 WBC
PATH REV BLD -IMP: NORMAL
PATH REV BLD -IMP: NORMAL
PLATELET # BLD AUTO: 388 K/UL (ref 150–450)
PMV BLD AUTO: 10.5 FL (ref 9.2–12.9)
RBC # BLD AUTO: 4.04 M/UL (ref 4–5.4)
WBC # BLD AUTO: 10.25 K/UL (ref 3.9–12.7)

## 2022-06-15 PROCEDURE — 83020 HEMOGLOBIN ELECTROPHORESIS: CPT | Mod: 91 | Performed by: NURSE PRACTITIONER

## 2022-06-15 PROCEDURE — 85025 COMPLETE CBC W/AUTO DIFF WBC: CPT | Performed by: NURSE PRACTITIONER

## 2022-06-15 PROCEDURE — 36415 COLL VENOUS BLD VENIPUNCTURE: CPT | Performed by: NURSE PRACTITIONER

## 2022-06-15 PROCEDURE — 99205 OFFICE O/P NEW HI 60 MIN: CPT | Mod: 95,,, | Performed by: NURSE PRACTITIONER

## 2022-06-15 PROCEDURE — 1160F PR REVIEW ALL MEDS BY PRESCRIBER/CLIN PHARMACIST DOCUMENTED: ICD-10-PCS | Mod: CPTII,95,, | Performed by: NURSE PRACTITIONER

## 2022-06-15 PROCEDURE — 99205 PR OFFICE/OUTPT VISIT, NEW, LEVL V, 60-74 MIN: ICD-10-PCS | Mod: 95,,, | Performed by: NURSE PRACTITIONER

## 2022-06-15 PROCEDURE — 1159F PR MEDICATION LIST DOCUMENTED IN MEDICAL RECORD: ICD-10-PCS | Mod: CPTII,95,, | Performed by: NURSE PRACTITIONER

## 2022-06-15 PROCEDURE — 1159F MED LIST DOCD IN RCRD: CPT | Mod: CPTII,95,, | Performed by: NURSE PRACTITIONER

## 2022-06-15 PROCEDURE — 85060 PATHOLOGIST REVIEW: ICD-10-PCS | Mod: ,,, | Performed by: PATHOLOGY

## 2022-06-15 PROCEDURE — 1160F RVW MEDS BY RX/DR IN RCRD: CPT | Mod: CPTII,95,, | Performed by: NURSE PRACTITIONER

## 2022-06-15 PROCEDURE — 83020 HEMOGLOBIN ELECTROPHORESIS: CPT | Performed by: NURSE PRACTITIONER

## 2022-06-15 PROCEDURE — 85060 BLOOD SMEAR INTERPRETATION: CPT | Mod: ,,, | Performed by: PATHOLOGY

## 2022-06-15 NOTE — TELEPHONE ENCOUNTER
Called and spoke with the patient about the following:    Your Surgery arrival time is at 5:30 am on 6/16/2022 at [facility] location.    The address is [address] [place]  [address].     Only one adult (over 18) is to accompany you to surgery, unless it is a Pediatric patient, then 2 adults are encouraged to accompany them to the surgery center.    Your ride MUST STAY the entire time until you are discharged.      Please come in the main lobby (located on the 1st floor).     Be prepared to show your photo ID and insurance card.     Nothing to eat or drink after after midnight, unless you were instructed to take specific medications discussed with the Pre-admit Nurse.     Please call [phone] with any questions or concerns.     Thanks.

## 2022-06-15 NOTE — PROGRESS NOTES
The patient location is: car  The chief complaint leading to consultation is: joint pain    Visit type: audiovisual    Face to Face time with patient: 15  30 minutes of total time spent on the encounter, which includes face to face time and non-face to face time preparing to see the patient (eg, review of tests), Obtaining and/or reviewing separately obtained history, Documenting clinical information in the electronic or other health record, Independently interpreting results (not separately reported) and communicating results to the patient/family/caregiver, or Care coordination (not separately reported).     Each patient to whom he or she provides medical services by telemedicine is:  (1) informed of the relationship between the physician and patient and the respective role of any other health care provider with respect to management of the patient; and (2) notified that he or she may decline to receive medical services by telemedicine and may withdraw from such care at any time.    Patient ID: Miriam Jett is a 26 y.o. female.    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  She has subchondral collapse as well as cortical defect of the humeral heads.  Approximately 60% of her humeral head is involved the left side. She is schedule for left shoulder hemiarthroplasty with humeral head resurfacing on 6/16/2022.  She has been referred to us by Dr. Michael Ramirez. She has also been found to have AVN of hips with previous surgeries.  Complains of chronic pain and denies know SS disease.  States that she has not been able to be able to work since 2020 due to chronic intolerable pain.      She states social alcohol use and occasional marijuana use.  Denies any other major medical conditions.      HIV and SLE negative.  Hypercoag workup negative.      Social  History     Socioeconomic History    Marital status: Single   Tobacco Use    Smoking status: Never Smoker    Smokeless tobacco: Never Used   Substance and Sexual Activity    Alcohol use: Yes     Alcohol/week: 1.0 standard drink     Types: 1 Glasses of wine per week     Comment: Occas    Drug use: Not Currently     Types: Marijuana    Sexual activity: Yes     Partners: Male     Birth control/protection: None       Family History   Problem Relation Age of Onset    Birth defects Neg Hx        Past Surgical History:   Procedure Laterality Date    BONE GRAFT Left 7/13/2021    Procedure: BONE GRAFT;  Surgeon: Spenser Krishnamurthy MD;  Location: [facility] OR;  Service: Orthopedics;  Laterality: Left;  pro-dense    CORE DECOMPRESSION OF HEAD OF FEMUR Left 7/13/2021    Procedure: CORE DECOMPRESSION, FEMUR, HEAD;  Surgeon: Spenser Krishnamurthy MD;  Location: [facility] OR;  Service: Orthopedics;  Laterality: Left;    HIP ARTHROPLASTY Right 10/6/2021    Procedure: ARTHROPLASTY, HIP;  Surgeon: Spenser Krishnamurthy MD;  Location: [facility] OR;  Service: Orthopedics;  Laterality: Right;    INJECTION OF JOINT Right 7/12/2021    Procedure: right IA Hip Joint injection- per Dr Ruelas;  Surgeon: Juan David Ho MD;  Location: [facility];  Service: Pain Management;  Laterality: Right;       Past Medical History:   Diagnosis Date    Miscarriage        Review of Systems   Constitutional: Negative for appetite change.   HENT: Negative for mouth sores.    Eyes: Negative for visual disturbance.   Respiratory: Negative for cough and shortness of breath.    Cardiovascular: Negative for chest pain.   Gastrointestinal: Negative for abdominal pain and diarrhea.   Genitourinary: Negative for frequency.   Musculoskeletal: Positive for arthralgias. Negative for back pain.   Skin: Negative for rash.   Neurological: Negative for headaches.   Hematological: Negative for adenopathy.   Psychiatric/Behavioral: The patient is not nervous/anxious.            Medication List with Changes/Refills   New Medications    ACETAMINOPHEN (TYLENOL) 500 MG TABLET    Take 2 tablets (1,000 mg total) by mouth every 8 (eight) hours as needed for Pain.    ASPIRIN (ECOTRIN) 81 MG EC TABLET    Take 1 tablet (81 mg total) by mouth 2 (two) times a day. for 14 days    NAPROXEN (NAPROSYN) 500 MG TABLET    Take 1 tablet (500 mg total) by mouth 2 (two) times daily with meals.    OXYCODONE (ROXICODONE) 5 MG IMMEDIATE RELEASE TABLET    Take 1 tablet (5 mg total) by mouth every 4 (four) hours as needed for Pain (post-op pain).   Current Medications    DICLOFENAC SODIUM (VOLTAREN) 1 % GEL    Apply 2 g topically 3 (three) times daily.    GABAPENTIN (NEURONTIN) 300 MG CAPSULE    Take 1 capsule (300 mg total) by mouth 2 (two) times daily.    MEGESTROL (MEGACE ES) 625 MG/5 ML (125 MG/ML) SUSP    daily as needed.     MELOXICAM (MOBIC) 15 MG TABLET    Take 1 tablet (15 mg total) by mouth once daily.    ONDANSETRON (ZOFRAN-ODT) 4 MG TBDL    Take 2 tablets (8 mg total) by mouth every 6 (six) hours as needed.    ONDANSETRON (ZOFRAN-ODT) 4 MG TBDL    Take 2 tablets (8 mg total) by mouth every 6 (six) hours as needed (Nausea).   Discontinued Medications    OXYCODONE-ACETAMINOPHEN (PERCOCET)  MG PER TABLET    Take 1 tablet by mouth every 8 (eight) hours as needed for Pain.        Objective:   There were no vitals filed for this visit.    Physical Exam  Pulmonary:      Effort: Pulmonary effort is normal.   Neurological:      General: No focal deficit present.      Mental Status: She is alert and oriented to person, place, and time.         Assessment:     Problem List Items Addressed This Visit        Orthopedic    Avascular necrosis of bone of left hip    Avascular necrosis of bone of right hip - Primary      Other Visit Diagnoses     Avascular necrosis of bone of shoulder        Relevant Orders    CBC Auto Differential (Completed)    Pathologist Interpretation Differential (Completed)    Hemoglobin  Electrophoresis,Hgb A2 Akil.    Immunofixation Electrophoresis    Protein Electrophoresis, Serum    Immunoglobulin Free LT Chains Blood    US Abdomen Complete    Gaucher disease        Relevant Orders    Immunofixation Electrophoresis    Protein Electrophoresis, Serum    Immunoglobulin Free LT Chains Blood    US Abdomen Complete        Lab Results   Component Value Date    WBC 10.25 06/15/2022    RBC 4.04 06/15/2022    HGB 12.0 06/15/2022    HCT 36.0 (L) 06/15/2022    MCV 89 06/15/2022    MCH 29.7 06/15/2022    MCHC 33.3 06/15/2022    RDW 13.7 06/15/2022     06/15/2022    MPV 10.5 06/15/2022    GRAN 6.1 06/15/2022    GRAN 59.1 06/15/2022    LYMPH 3.2 06/15/2022    LYMPH 30.7 06/15/2022    MONO 0.9 06/15/2022    MONO 8.8 06/15/2022    EOS 0.0 06/15/2022    BASO 0.06 06/15/2022    EOSINOPHIL 0.4 06/15/2022    BASOPHIL 0.6 06/15/2022     Peripheral smear:  White cells: Within normal limits.   Red Cells: Normocytic and normochromic without significant anemia. No   schistocytes or sickle cells appreciated.   Platelets: Within normal limits.     Plan:   Avascular necrosis of bone of right hip    Avascular necrosis of bone of shoulder  -     Ambulatory referral/consult to Hematology / Oncology  -     CBC Auto Differential; Future; Expected date: 06/15/2022  -     Pathologist Interpretation Differential; Future; Expected date: 06/15/2022  -     Hemoglobin Electrophoresis,Hgb A2 Akil.; Future; Expected date: 06/15/2022  -     Immunofixation Electrophoresis; Future; Expected date: 06/16/2022  -     Protein Electrophoresis, Serum; Future; Expected date: 06/16/2022  -     Immunoglobulin Free LT Chains Blood; Future; Expected date: 06/16/2022  -     US Abdomen Complete; Future; Expected date: 06/16/2022    Avascular necrosis of bone of left hip    Gaucher disease  -     Immunofixation Electrophoresis; Future; Expected date: 06/16/2022  -     Protein Electrophoresis, Serum; Future; Expected date: 06/16/2022  -      Immunoglobulin Free LT Chains Blood; Future; Expected date: 06/16/2022  -     US Abdomen Complete; Future; Expected date: 06/16/2022    Will get hemoglobin electrophoreses.  CBC and path review.      Update:  CBC normal with path review negative for sickle cells.  hgb electrophoresis pending.    Will r/o Gaucher's disease - abdominal US to assess for hepatosplenomegaly  BMBx to assess for Gaucher's disease  Labs - spep, flc, marilyn r/o gammopathy/myeloma    RTC in 3 weeks to discuss results    Collaborating Provider:  Dr. Sushil Jang    Thank You,  Fantasma Thornton, FNP-C  Hematology Oncology

## 2022-06-15 NOTE — H&P (VIEW-ONLY)
The patient location is: car  The chief complaint leading to consultation is: joint pain    Visit type: audiovisual    Face to Face time with patient: 15  30 minutes of total time spent on the encounter, which includes face to face time and non-face to face time preparing to see the patient (eg, review of tests), Obtaining and/or reviewing separately obtained history, Documenting clinical information in the electronic or other health record, Independently interpreting results (not separately reported) and communicating results to the patient/family/caregiver, or Care coordination (not separately reported).     Each patient to whom he or she provides medical services by telemedicine is:  (1) informed of the relationship between the physician and patient and the respective role of any other health care provider with respect to management of the patient; and (2) notified that he or she may decline to receive medical services by telemedicine and may withdraw from such care at any time.    Patient ID: Miriam Jett is a 26 y.o. female.    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  She has subchondral collapse as well as cortical defect of the humeral heads.  Approximately 60% of her humeral head is involved the left side. She is schedule for left shoulder hemiarthroplasty with humeral head resurfacing on 6/16/2022.  She has been referred to us by Dr. Michael Ramirez. She has also been found to have AVN of hips with previous surgeries.  Complains of chronic pain and denies know SS disease.  States that she has not been able to be able to work since 2020 due to chronic intolerable pain.      She states social alcohol use and occasional marijuana use.  Denies any other major medical conditions.      HIV and SLE negative.  Hypercoag workup negative.      Social  History     Socioeconomic History    Marital status: Single   Tobacco Use    Smoking status: Never Smoker    Smokeless tobacco: Never Used   Substance and Sexual Activity    Alcohol use: Yes     Alcohol/week: 1.0 standard drink     Types: 1 Glasses of wine per week     Comment: Occas    Drug use: Not Currently     Types: Marijuana    Sexual activity: Yes     Partners: Male     Birth control/protection: None       Family History   Problem Relation Age of Onset    Birth defects Neg Hx        Past Surgical History:   Procedure Laterality Date    BONE GRAFT Left 7/13/2021    Procedure: BONE GRAFT;  Surgeon: Spenser Krishnamurthy MD;  Location: [facility] OR;  Service: Orthopedics;  Laterality: Left;  pro-dense    CORE DECOMPRESSION OF HEAD OF FEMUR Left 7/13/2021    Procedure: CORE DECOMPRESSION, FEMUR, HEAD;  Surgeon: Spenser Krishnamurthy MD;  Location: [facility] OR;  Service: Orthopedics;  Laterality: Left;    HIP ARTHROPLASTY Right 10/6/2021    Procedure: ARTHROPLASTY, HIP;  Surgeon: Spenser Krishnamurthy MD;  Location: [facility] OR;  Service: Orthopedics;  Laterality: Right;    INJECTION OF JOINT Right 7/12/2021    Procedure: right IA Hip Joint injection- per Dr Ruelas;  Surgeon: Juan David Ho MD;  Location: [facility]IN [facility];  Service: Pain Management;  Laterality: Right;       Past Medical History:   Diagnosis Date    Miscarriage        Review of Systems   Constitutional: Negative for appetite change.   HENT: Negative for mouth sores.    Eyes: Negative for visual disturbance.   Respiratory: Negative for cough and shortness of breath.    Cardiovascular: Negative for chest pain.   Gastrointestinal: Negative for abdominal pain and diarrhea.   Genitourinary: Negative for frequency.   Musculoskeletal: Positive for arthralgias. Negative for back pain.   Skin: Negative for rash.   Neurological: Negative for headaches.   Hematological: Negative for adenopathy.   Psychiatric/Behavioral: The patient is not nervous/anxious.            Medication List with Changes/Refills   New Medications    ACETAMINOPHEN (TYLENOL) 500 MG TABLET    Take 2 tablets (1,000 mg total) by mouth every 8 (eight) hours as needed for Pain.    ASPIRIN (ECOTRIN) 81 MG EC TABLET    Take 1 tablet (81 mg total) by mouth 2 (two) times a day. for 14 days    NAPROXEN (NAPROSYN) 500 MG TABLET    Take 1 tablet (500 mg total) by mouth 2 (two) times daily with meals.    OXYCODONE (ROXICODONE) 5 MG IMMEDIATE RELEASE TABLET    Take 1 tablet (5 mg total) by mouth every 4 (four) hours as needed for Pain (post-op pain).   Current Medications    DICLOFENAC SODIUM (VOLTAREN) 1 % GEL    Apply 2 g topically 3 (three) times daily.    GABAPENTIN (NEURONTIN) 300 MG CAPSULE    Take 1 capsule (300 mg total) by mouth 2 (two) times daily.    MEGESTROL (MEGACE ES) 625 MG/5 ML (125 MG/ML) SUSP    daily as needed.     MELOXICAM (MOBIC) 15 MG TABLET    Take 1 tablet (15 mg total) by mouth once daily.    ONDANSETRON (ZOFRAN-ODT) 4 MG TBDL    Take 2 tablets (8 mg total) by mouth every 6 (six) hours as needed.    ONDANSETRON (ZOFRAN-ODT) 4 MG TBDL    Take 2 tablets (8 mg total) by mouth every 6 (six) hours as needed (Nausea).   Discontinued Medications    OXYCODONE-ACETAMINOPHEN (PERCOCET)  MG PER TABLET    Take 1 tablet by mouth every 8 (eight) hours as needed for Pain.        Objective:   There were no vitals filed for this visit.    Physical Exam  Pulmonary:      Effort: Pulmonary effort is normal.   Neurological:      General: No focal deficit present.      Mental Status: She is alert and oriented to person, place, and time.         Assessment:     Problem List Items Addressed This Visit        Orthopedic    Avascular necrosis of bone of left hip    Avascular necrosis of bone of right hip - Primary      Other Visit Diagnoses     Avascular necrosis of bone of shoulder        Relevant Orders    CBC Auto Differential (Completed)    Pathologist Interpretation Differential (Completed)    Hemoglobin  Electrophoresis,Hgb A2 Akil.    Immunofixation Electrophoresis    Protein Electrophoresis, Serum    Immunoglobulin Free LT Chains Blood    US Abdomen Complete    Gaucher disease        Relevant Orders    Immunofixation Electrophoresis    Protein Electrophoresis, Serum    Immunoglobulin Free LT Chains Blood    US Abdomen Complete        Lab Results   Component Value Date    WBC 10.25 06/15/2022    RBC 4.04 06/15/2022    HGB 12.0 06/15/2022    HCT 36.0 (L) 06/15/2022    MCV 89 06/15/2022    MCH 29.7 06/15/2022    MCHC 33.3 06/15/2022    RDW 13.7 06/15/2022     06/15/2022    MPV 10.5 06/15/2022    GRAN 6.1 06/15/2022    GRAN 59.1 06/15/2022    LYMPH 3.2 06/15/2022    LYMPH 30.7 06/15/2022    MONO 0.9 06/15/2022    MONO 8.8 06/15/2022    EOS 0.0 06/15/2022    BASO 0.06 06/15/2022    EOSINOPHIL 0.4 06/15/2022    BASOPHIL 0.6 06/15/2022     Peripheral smear:  White cells: Within normal limits.   Red Cells: Normocytic and normochromic without significant anemia. No   schistocytes or sickle cells appreciated.   Platelets: Within normal limits.     Plan:   Avascular necrosis of bone of right hip    Avascular necrosis of bone of shoulder  -     Ambulatory referral/consult to Hematology / Oncology  -     CBC Auto Differential; Future; Expected date: 06/15/2022  -     Pathologist Interpretation Differential; Future; Expected date: 06/15/2022  -     Hemoglobin Electrophoresis,Hgb A2 Akil.; Future; Expected date: 06/15/2022  -     Immunofixation Electrophoresis; Future; Expected date: 06/16/2022  -     Protein Electrophoresis, Serum; Future; Expected date: 06/16/2022  -     Immunoglobulin Free LT Chains Blood; Future; Expected date: 06/16/2022  -     US Abdomen Complete; Future; Expected date: 06/16/2022    Avascular necrosis of bone of left hip    Gaucher disease  -     Immunofixation Electrophoresis; Future; Expected date: 06/16/2022  -     Protein Electrophoresis, Serum; Future; Expected date: 06/16/2022  -      Immunoglobulin Free LT Chains Blood; Future; Expected date: 06/16/2022  -     US Abdomen Complete; Future; Expected date: 06/16/2022    Will get hemoglobin electrophoreses.  CBC and path review.      Update:  CBC normal with path review negative for sickle cells.  hgb electrophoresis pending.    Will r/o Gaucher's disease - abdominal US to assess for hepatosplenomegaly  BMBx to assess for Gaucher's disease  Labs - spep, flc, marilyn r/o gammopathy/myeloma    RTC in 3 weeks to discuss results    Collaborating Provider:  Dr. Sushil Jang    Thank You,  Fantasma Thornton, FNP-C  Hematology Oncology

## 2022-06-16 ENCOUNTER — HOSPITAL ENCOUNTER (OUTPATIENT)
Dept: RADIOLOGY | Facility: HOSPITAL | Age: 27
Discharge: HOME | End: 2022-06-16
Attending: STUDENT IN AN ORGANIZED HEALTH CARE EDUCATION/TRAINING PROGRAM | Admitting: STUDENT IN AN ORGANIZED HEALTH CARE EDUCATION/TRAINING PROGRAM
Payer: MEDICAID

## 2022-06-16 ENCOUNTER — ANESTHESIA (OUTPATIENT)
Dept: SURGERY | Facility: HOSPITAL | Age: 27
End: 2022-06-16
Payer: MEDICAID

## 2022-06-16 ENCOUNTER — HOSPITAL ENCOUNTER (OUTPATIENT)
Facility: HOSPITAL | Age: 27
Discharge: HOME | End: 2022-06-16
Attending: STUDENT IN AN ORGANIZED HEALTH CARE EDUCATION/TRAINING PROGRAM | Admitting: STUDENT IN AN ORGANIZED HEALTH CARE EDUCATION/TRAINING PROGRAM
Payer: MEDICAID

## 2022-06-16 VITALS
HEIGHT: 67 IN | HEART RATE: 78 BPM | WEIGHT: 175.69 LBS | TEMPERATURE: 98 F | DIASTOLIC BLOOD PRESSURE: 74 MMHG | RESPIRATION RATE: 17 BRPM | SYSTOLIC BLOOD PRESSURE: 134 MMHG | OXYGEN SATURATION: 99 % | BODY MASS INDEX: 27.57 KG/M2

## 2022-06-16 DIAGNOSIS — M16.0 BILATERAL PRIMARY OSTEOARTHRITIS OF HIP: ICD-10-CM

## 2022-06-16 DIAGNOSIS — M87.029 AVASCULAR NECROSIS OF HEAD OF HUMERUS: ICD-10-CM

## 2022-06-16 DIAGNOSIS — M87.022 AVASCULAR NECROSIS OF LEFT HUMERAL HEAD: Primary | ICD-10-CM

## 2022-06-16 LAB
B-HCG UR QL: NEGATIVE
CTP QC/QA: YES
SARS-COV-2 RNA NPH QL NAA+NON-PROBE: NOT DETECTED
SARS-COV-2 RNA RESP QL NAA+PROBE: NORMAL

## 2022-06-16 PROCEDURE — 63600175 PHARM REV CODE 636 W HCPCS: Performed by: NURSE ANESTHETIST, CERTIFIED REGISTERED

## 2022-06-16 PROCEDURE — 36000711: Performed by: STUDENT IN AN ORGANIZED HEALTH CARE EDUCATION/TRAINING PROGRAM

## 2022-06-16 PROCEDURE — 27201423 OPTIME MED/SURG SUP & DEVICES STERILE SUPPLY: Performed by: STUDENT IN AN ORGANIZED HEALTH CARE EDUCATION/TRAINING PROGRAM

## 2022-06-16 PROCEDURE — C1776 JOINT DEVICE (IMPLANTABLE): HCPCS | Performed by: STUDENT IN AN ORGANIZED HEALTH CARE EDUCATION/TRAINING PROGRAM

## 2022-06-16 PROCEDURE — 76942 PR U/S GUIDANCE FOR NEEDLE GUIDANCE: ICD-10-PCS | Mod: 26,,, | Performed by: ANESTHESIOLOGY

## 2022-06-16 PROCEDURE — 81025 URINE PREGNANCY TEST: CPT | Performed by: STUDENT IN AN ORGANIZED HEALTH CARE EDUCATION/TRAINING PROGRAM

## 2022-06-16 PROCEDURE — 63600175 PHARM REV CODE 636 W HCPCS: Performed by: ANESTHESIOLOGY

## 2022-06-16 PROCEDURE — 64415 NJX AA&/STRD BRCH PLXS IMG: CPT | Mod: 59,LT | Performed by: ANESTHESIOLOGY

## 2022-06-16 PROCEDURE — 73020 X-RAY EXAM OF SHOULDER: CPT | Mod: 26,LT,, | Performed by: RADIOLOGY

## 2022-06-16 PROCEDURE — 36000710: Performed by: STUDENT IN AN ORGANIZED HEALTH CARE EDUCATION/TRAINING PROGRAM

## 2022-06-16 PROCEDURE — 63600175 PHARM REV CODE 636 W HCPCS

## 2022-06-16 PROCEDURE — 73020 X-RAY EXAM OF SHOULDER: CPT | Mod: TC,LT

## 2022-06-16 PROCEDURE — 37000008 HC ANESTHESIA 1ST 15 MINUTES: Performed by: STUDENT IN AN ORGANIZED HEALTH CARE EDUCATION/TRAINING PROGRAM

## 2022-06-16 PROCEDURE — D9220A PRA ANESTHESIA: ICD-10-PCS | Mod: CRNA,,, | Performed by: NURSE ANESTHETIST, CERTIFIED REGISTERED

## 2022-06-16 PROCEDURE — 71000015 HC POSTOP RECOV 1ST HR: Performed by: STUDENT IN AN ORGANIZED HEALTH CARE EDUCATION/TRAINING PROGRAM

## 2022-06-16 PROCEDURE — 71000033 HC RECOVERY, INTIAL HOUR: Performed by: STUDENT IN AN ORGANIZED HEALTH CARE EDUCATION/TRAINING PROGRAM

## 2022-06-16 PROCEDURE — D9220A PRA ANESTHESIA: ICD-10-PCS | Mod: ANES,,, | Performed by: ANESTHESIOLOGY

## 2022-06-16 PROCEDURE — 76942 ECHO GUIDE FOR BIOPSY: CPT | Mod: 26,,, | Performed by: ANESTHESIOLOGY

## 2022-06-16 PROCEDURE — 71000039 HC RECOVERY, EACH ADD'L HOUR: Performed by: STUDENT IN AN ORGANIZED HEALTH CARE EDUCATION/TRAINING PROGRAM

## 2022-06-16 PROCEDURE — 01630 ANES OPN/ARTHR PX SHO JT NOS: CPT | Performed by: STUDENT IN AN ORGANIZED HEALTH CARE EDUCATION/TRAINING PROGRAM

## 2022-06-16 PROCEDURE — 23470 PR RECONSTRUCT PROX HUMERAL IMPLANT: ICD-10-PCS | Mod: LT,,, | Performed by: STUDENT IN AN ORGANIZED HEALTH CARE EDUCATION/TRAINING PROGRAM

## 2022-06-16 PROCEDURE — D9220A PRA ANESTHESIA: Mod: CRNA,,, | Performed by: NURSE ANESTHETIST, CERTIFIED REGISTERED

## 2022-06-16 PROCEDURE — 63600175 PHARM REV CODE 636 W HCPCS: Performed by: STUDENT IN AN ORGANIZED HEALTH CARE EDUCATION/TRAINING PROGRAM

## 2022-06-16 PROCEDURE — C1713 ANCHOR/SCREW BN/BN,TIS/BN: HCPCS | Performed by: STUDENT IN AN ORGANIZED HEALTH CARE EDUCATION/TRAINING PROGRAM

## 2022-06-16 PROCEDURE — 64415 PR NERVE BLOCK INJ, ANES/STEROID, BRACHIAL PLEXUS, INCL IMAG GUIDANCE: ICD-10-PCS | Mod: 59,LT,, | Performed by: ANESTHESIOLOGY

## 2022-06-16 PROCEDURE — 23470 RECONSTRUCT SHOULDER JOINT: CPT | Mod: LT,,, | Performed by: STUDENT IN AN ORGANIZED HEALTH CARE EDUCATION/TRAINING PROGRAM

## 2022-06-16 PROCEDURE — 64450 NJX AA&/STRD OTHER PN/BRANCH: CPT | Performed by: STUDENT IN AN ORGANIZED HEALTH CARE EDUCATION/TRAINING PROGRAM

## 2022-06-16 PROCEDURE — 64415 NJX AA&/STRD BRCH PLXS IMG: CPT | Mod: 59,LT,, | Performed by: ANESTHESIOLOGY

## 2022-06-16 PROCEDURE — 25000003 PHARM REV CODE 250: Performed by: NURSE ANESTHETIST, CERTIFIED REGISTERED

## 2022-06-16 PROCEDURE — 73020 XR SHOULDER 1 VIEW LEFT: ICD-10-PCS | Mod: 26,LT,, | Performed by: RADIOLOGY

## 2022-06-16 PROCEDURE — 37000009 HC ANESTHESIA EA ADD 15 MINS: Performed by: STUDENT IN AN ORGANIZED HEALTH CARE EDUCATION/TRAINING PROGRAM

## 2022-06-16 PROCEDURE — D9220A PRA ANESTHESIA: Mod: ANES,,, | Performed by: ANESTHESIOLOGY

## 2022-06-16 DEVICE — IMPLANTABLE DEVICE: Type: IMPLANTABLE DEVICE | Site: SHOULDER | Status: FUNCTIONAL

## 2022-06-16 RX ORDER — DIPHENHYDRAMINE HYDROCHLORIDE 50 MG/ML
25 INJECTION INTRAMUSCULAR; INTRAVENOUS EVERY 6 HOURS PRN
Status: DISCONTINUED | OUTPATIENT
Start: 2022-06-16 | End: 2022-06-16 | Stop reason: HOSPADM

## 2022-06-16 RX ORDER — PROPOFOL 10 MG/ML
VIAL (ML) INTRAVENOUS
Status: DISCONTINUED | OUTPATIENT
Start: 2022-06-16 | End: 2022-06-16

## 2022-06-16 RX ORDER — CEFAZOLIN SODIUM 2 G/50ML
2 SOLUTION INTRAVENOUS ONCE
Status: COMPLETED | OUTPATIENT
Start: 2022-06-16 | End: 2022-06-16

## 2022-06-16 RX ORDER — CHLORHEXIDINE GLUCONATE ORAL RINSE 1.2 MG/ML
10 SOLUTION DENTAL
Status: DISCONTINUED | OUTPATIENT
Start: 2022-06-16 | End: 2022-06-16 | Stop reason: HOSPADM

## 2022-06-16 RX ORDER — ROCURONIUM BROMIDE 10 MG/ML
INJECTION, SOLUTION INTRAVENOUS
Status: DISCONTINUED | OUTPATIENT
Start: 2022-06-16 | End: 2022-06-16

## 2022-06-16 RX ORDER — MEPERIDINE HYDROCHLORIDE 25 MG/ML
12.5 INJECTION INTRAMUSCULAR; INTRAVENOUS; SUBCUTANEOUS ONCE
Status: DISCONTINUED | OUTPATIENT
Start: 2022-06-16 | End: 2022-06-16 | Stop reason: HOSPADM

## 2022-06-16 RX ORDER — ALBUTEROL SULFATE 0.83 MG/ML
2.5 SOLUTION RESPIRATORY (INHALATION) EVERY 4 HOURS PRN
Status: DISCONTINUED | OUTPATIENT
Start: 2022-06-16 | End: 2022-06-16 | Stop reason: HOSPADM

## 2022-06-16 RX ORDER — MIDAZOLAM HYDROCHLORIDE 1 MG/ML
INJECTION INTRAMUSCULAR; INTRAVENOUS
Status: DISCONTINUED | OUTPATIENT
Start: 2022-06-16 | End: 2022-06-16

## 2022-06-16 RX ORDER — ROPIVACAINE HYDROCHLORIDE 5 MG/ML
INJECTION, SOLUTION EPIDURAL; INFILTRATION; PERINEURAL
Status: COMPLETED | OUTPATIENT
Start: 2022-06-16 | End: 2022-06-16

## 2022-06-16 RX ORDER — LIDOCAINE HYDROCHLORIDE 40 MG/ML
SOLUTION TOPICAL
Status: DISCONTINUED | OUTPATIENT
Start: 2022-06-16 | End: 2022-06-16

## 2022-06-16 RX ORDER — FENTANYL CITRATE 50 UG/ML
INJECTION, SOLUTION INTRAMUSCULAR; INTRAVENOUS
Status: DISCONTINUED | OUTPATIENT
Start: 2022-06-16 | End: 2022-06-16

## 2022-06-16 RX ORDER — NAPROXEN 500 MG/1
500 TABLET ORAL 2 TIMES DAILY WITH MEALS
Qty: 60 TABLET | Refills: 0 | Status: SHIPPED | OUTPATIENT
Start: 2022-06-16 | End: 2022-07-16

## 2022-06-16 RX ORDER — LIDOCAINE HYDROCHLORIDE 20 MG/ML
INJECTION, SOLUTION EPIDURAL; INFILTRATION; INTRACAUDAL; PERINEURAL
Status: DISCONTINUED | OUTPATIENT
Start: 2022-06-16 | End: 2022-06-16

## 2022-06-16 RX ORDER — ONDANSETRON 2 MG/ML
4 INJECTION INTRAMUSCULAR; INTRAVENOUS ONCE AS NEEDED
Status: DISCONTINUED | OUTPATIENT
Start: 2022-06-16 | End: 2022-06-16 | Stop reason: HOSPADM

## 2022-06-16 RX ORDER — ONDANSETRON HYDROCHLORIDE 2 MG/ML
INJECTION, SOLUTION INTRAMUSCULAR; INTRAVENOUS
Status: DISCONTINUED | OUTPATIENT
Start: 2022-06-16 | End: 2022-06-16

## 2022-06-16 RX ORDER — TRANEXAMIC ACID 100 MG/ML
INJECTION, SOLUTION INTRAVENOUS
Status: DISCONTINUED
Start: 2022-06-16 | End: 2022-06-16 | Stop reason: HOSPADM

## 2022-06-16 RX ORDER — ASPIRIN 81 MG/1
81 TABLET ORAL 2 TIMES DAILY
Qty: 28 TABLET | Refills: 0 | Status: SHIPPED | OUTPATIENT
Start: 2022-06-16 | End: 2023-02-09

## 2022-06-16 RX ORDER — KETAMINE HYDROCHLORIDE 50 MG/ML
INJECTION, SOLUTION INTRAMUSCULAR; INTRAVENOUS
Status: DISCONTINUED | OUTPATIENT
Start: 2022-06-16 | End: 2022-06-16

## 2022-06-16 RX ORDER — FENTANYL CITRATE 50 UG/ML
INJECTION, SOLUTION INTRAMUSCULAR; INTRAVENOUS
Status: COMPLETED
Start: 2022-06-16 | End: 2022-06-16

## 2022-06-16 RX ORDER — CEFAZOLIN SODIUM 2 G/50ML
2 SOLUTION INTRAVENOUS
Status: DISCONTINUED | OUTPATIENT
Start: 2022-06-16 | End: 2022-06-16 | Stop reason: SDUPTHER

## 2022-06-16 RX ORDER — DEXAMETHASONE SODIUM PHOSPHATE 4 MG/ML
INJECTION, SOLUTION INTRA-ARTICULAR; INTRALESIONAL; INTRAMUSCULAR; INTRAVENOUS; SOFT TISSUE
Status: DISCONTINUED | OUTPATIENT
Start: 2022-06-16 | End: 2022-06-16

## 2022-06-16 RX ORDER — EPINEPHRINE 1 MG/ML
INJECTION, SOLUTION INTRACARDIAC; INTRAMUSCULAR; INTRAVENOUS; SUBCUTANEOUS
Status: DISCONTINUED
Start: 2022-06-16 | End: 2022-06-16 | Stop reason: HOSPADM

## 2022-06-16 RX ORDER — OXYCODONE HYDROCHLORIDE 5 MG/1
5 TABLET ORAL EVERY 4 HOURS PRN
Qty: 42 TABLET | Refills: 0 | Status: SHIPPED | OUTPATIENT
Start: 2022-06-16 | End: 2023-02-09

## 2022-06-16 RX ORDER — FENTANYL CITRATE 50 UG/ML
25 INJECTION, SOLUTION INTRAMUSCULAR; INTRAVENOUS EVERY 5 MIN PRN
Status: COMPLETED | OUTPATIENT
Start: 2022-06-16 | End: 2022-06-16

## 2022-06-16 RX ORDER — ACETAMINOPHEN 500 MG
1000 TABLET ORAL EVERY 8 HOURS PRN
Qty: 60 TABLET | Refills: 0 | Status: ON HOLD | OUTPATIENT
Start: 2022-06-16 | End: 2023-07-24 | Stop reason: HOSPADM

## 2022-06-16 RX ORDER — SUCCINYLCHOLINE CHLORIDE 20 MG/ML
INJECTION INTRAMUSCULAR; INTRAVENOUS
Status: DISCONTINUED | OUTPATIENT
Start: 2022-06-16 | End: 2022-06-16

## 2022-06-16 RX ORDER — HYDROCODONE BITARTRATE AND ACETAMINOPHEN 5; 325 MG/1; MG/1
1 TABLET ORAL
Status: DISCONTINUED | OUTPATIENT
Start: 2022-06-16 | End: 2022-06-16 | Stop reason: HOSPADM

## 2022-06-16 RX ORDER — SODIUM CHLORIDE 9 MG/ML
INJECTION, SOLUTION INTRAVENOUS CONTINUOUS
Status: DISCONTINUED | OUTPATIENT
Start: 2022-06-16 | End: 2022-06-16 | Stop reason: HOSPADM

## 2022-06-16 RX ADMIN — ROPIVACAINE HYDROCHLORIDE 20 ML: 5 INJECTION, SOLUTION EPIDURAL; INFILTRATION; PERINEURAL at 07:06

## 2022-06-16 RX ADMIN — FENTANYL CITRATE 25 MCG: 50 INJECTION, SOLUTION INTRAMUSCULAR; INTRAVENOUS at 09:06

## 2022-06-16 RX ADMIN — KETAMINE HYDROCHLORIDE 50 MG: 50 INJECTION, SOLUTION INTRAMUSCULAR; INTRAVENOUS at 07:06

## 2022-06-16 RX ADMIN — ONDANSETRON HYDROCHLORIDE 4 MG: 2 INJECTION, SOLUTION INTRAMUSCULAR; INTRAVENOUS at 08:06

## 2022-06-16 RX ADMIN — ROCURONIUM BROMIDE 5 MG: 10 INJECTION, SOLUTION INTRAVENOUS at 07:06

## 2022-06-16 RX ADMIN — SUCCINYLCHOLINE CHLORIDE 140 MG: 20 INJECTION, SOLUTION INTRAMUSCULAR; INTRAVENOUS at 07:06

## 2022-06-16 RX ADMIN — MIDAZOLAM HYDROCHLORIDE 2 MG: 1 INJECTION INTRAMUSCULAR; INTRAVENOUS at 06:06

## 2022-06-16 RX ADMIN — FENTANYL CITRATE 100 MCG: 50 INJECTION, SOLUTION INTRAMUSCULAR; INTRAVENOUS at 06:06

## 2022-06-16 RX ADMIN — CEFAZOLIN SODIUM 2 G: 2 SOLUTION INTRAVENOUS at 10:06

## 2022-06-16 RX ADMIN — DEXTROSE 2 G: 50 INJECTION, SOLUTION INTRAVENOUS at 07:06

## 2022-06-16 RX ADMIN — ROCURONIUM BROMIDE 20 MG: 10 INJECTION, SOLUTION INTRAVENOUS at 07:06

## 2022-06-16 RX ADMIN — FENTANYL CITRATE 50 MCG: 50 INJECTION, SOLUTION INTRAMUSCULAR; INTRAVENOUS at 08:06

## 2022-06-16 RX ADMIN — LIDOCAINE HYDROCHLORIDE 40 MG: 40 SOLUTION TOPICAL at 07:06

## 2022-06-16 RX ADMIN — PROPOFOL 200 MG: 10 INJECTION, EMULSION INTRAVENOUS at 07:06

## 2022-06-16 RX ADMIN — DEXAMETHASONE SODIUM PHOSPHATE 4 MG: 4 INJECTION, SOLUTION INTRA-ARTICULAR; INTRALESIONAL; INTRAMUSCULAR; INTRAVENOUS; SOFT TISSUE at 07:06

## 2022-06-16 RX ADMIN — FENTANYL CITRATE 25 MCG: 50 INJECTION, SOLUTION INTRAMUSCULAR; INTRAVENOUS at 10:06

## 2022-06-16 RX ADMIN — FENTANYL CITRATE 50 MCG: 50 INJECTION, SOLUTION INTRAMUSCULAR; INTRAVENOUS at 09:06

## 2022-06-16 RX ADMIN — LIDOCAINE HYDROCHLORIDE 20 MG: 20 INJECTION, SOLUTION EPIDURAL; INFILTRATION; INTRACAUDAL; PERINEURAL at 07:06

## 2022-06-16 NOTE — DISCHARGE SUMMARY
[facility]  Discharge Note  Short Stay    Procedure(s) (LRB):  HEMIARTHROPLASTY, SHOULDER (Left)    OUTCOME: Patient tolerated treatment/procedure well without complication and is now ready for discharge.    DISPOSITION: Home or Self Care    FINAL DIAGNOSIS:  Left humeral head AVN    FOLLOWUP: In clinic    DISCHARGE INSTRUCTIONS:  No discharge procedures on file.     TIME SPENT ON DISCHARGE: 10 minutes

## 2022-06-16 NOTE — ANESTHESIA PROCEDURE NOTES
Intubation    Date/Time: 6/16/2022 7:14 AM  Performed by: Lynsey Franco CRNA  Authorized by: Laila Rosales MD     Intubation:     Induction:  Intravenous    Intubated:  Postinduction    Mask Ventilation:  Easy mask    Attempts:  1    Attempted By:  CRNA    Method of Intubation:  Direct    Blade:  Edmonds 2    Laryngeal View Grade: Grade I - full view of cords      Difficult Airway Encountered?: No      Complications:  None    Airway Device:  Oral endotracheal tube    Airway Device Size:  7.0    Style/Cuff Inflation:  Cuffed    Inflation Amount (mL):  4    Tube secured:  21    Secured at:  The lips    Placement Verified By:  Capnometry and Revisualization with laryngoscopy (Bilateral Breath Sounds)    Complicating Factors:  None    Findings Post-Intubation:  BS equal bilateral and atraumatic/condition of teeth unchanged

## 2022-06-16 NOTE — PLAN OF CARE
Pt lying in bed in NAD,VSS,RR equal and unlabored. Bed is low, locked, and call light in reach. Pt awaiting covid results.

## 2022-06-16 NOTE — TRANSFER OF CARE
"Anesthesia Transfer of Care Note    Patient: Miriam Jett    Procedure(s) Performed: Procedure(s) (LRB):  HEMIARTHROPLASTY, SHOULDER (Left)    Patient location: PACU    Anesthesia Type: general    Transport from OR: Transported from OR on room air with adequate spontaneous ventilation    Post pain: adequate analgesia    Post assessment: no apparent anesthetic complications    Post vital signs: stable    Level of consciousness: awake    Nausea/Vomiting: no nausea/vomiting    Complications: none    Transfer of care protocol was followed      Last vitals:   Visit Vitals  /75 (BP Location: Right arm, Patient Position: Lying)   Pulse 62   Temp 36.6 °C (97.8 °F) (Temporal)   Resp 14   Ht 5' 7" (1.702 m)   Wt 79.7 kg (175 lb 11.3 oz)   LMP 05/20/2022 (Approximate)   SpO2 96%   Breastfeeding No   BMI 27.52 kg/m²     "

## 2022-06-16 NOTE — PATIENT INSTRUCTIONS
TOTAL SHOULDER ARTHROPLASTY    Contact the [facility] at [phone] if you have questions about your instructions or follow-up appointment.    DIET:   Start with clear liquids and light foods to minimize nausea. Once these are tolerated, advance to a regular diet.     DRESSING AND WOUND CARE:   Keep the dressing clean and dry. It is normal for there to be some drainage after surgery since the shoulder was irrigated with large amounts of fluid. Reinforce with additional gauze as necessary.  Remove the dressing the 7th day after surgery and begin changing daily with clean gauze or Band-Aids®. Keep your incisions covered until you follow up in clinic.   If you have Steri-Strips in place of stitches, allow them to stay in place as long as possible. Steri-Strips are made of a fabric material that can get wet in the shower and pat dry with a towel. They usually fall off on their own within 7 to 10 days. You may trim the edges as they begin to curl.      BATHING:   You may bathe or shower on the 7th day after surgery, but do not scrub or soak the incisions. Dry the area by gently blotting it with a gauze or towel. After it is completely dry, cover the wound with clean gauze or Band-Aids®. Do NOT submerge the incisions (bath/swim) until after the sutures are removed and the wound has completely healed.     ACTIVITY:    Ice should be applied to the shoulder for 20-30 minutes, 5-6 times a day, to help control pain and swelling. Apply additional times as needed, especially after exercise, for the first 3-4 weeks. Do not apply ice directly to the skin; use a thin barrier in between. Also, do not use heat.    Elevate the shoulder by sleeping as upright as possible using extra pillows or a recliner. Do this for the first few days to help decrease pain and swelling.   Wear the sling at all times for 6 weeks including while sleeping. The only time you may remove the sling is for bathing and exercises. Do not lean  or put your body weight on your arm.   When your block wears off, start the following exercises:  Remove the sling for 5-10 minutes, 3 times a day, to do the following exercises:  Fully bend & straighten your fingers, your wrist, and your elbow several times.   Lean forward, bracing yourself on a table/counter with your normal arm. Let your surgical arm relax and hang straight down. Shift your weight so that your arm moves side to side, front to back, and in gentle circles like a pendulum or elephant's trunk. Use your body to generate the movement for this, NOT your surgical shoulder's muscles. (see drawing below)     Physical therapy should be started within 3 days of surgery. Take your therapy prescription to the PT clinic of your choice. If you have not received a therapy prescription, please contact your surgeon's office to have a script sent to your physical therapist.     PAIN CONTROL:    It is important to stay ahead of pain as it becomes challenging to get under control if you fall behind. Ice and elevation can help and should be used as much as possible in the first few days.   Narcotic pain medications, such as tramadol and oxycodone, should be taken as prescribed. The Tramadol is intended to be taken first as the primary medicine and then oxycodone taken for breakthrough pain. Wean off as soon as possible. Take these with food to decrease the chances of nausea and vomiting. Do not drink alcohol, drive a vehicle, or use heavy machinery while taking narcotic pain medications.    NSAID medications are used for pain control and to decrease inflammation. You may be prescribed an NSAID such as celebrex. Take as instructed. Other NSAID medications such as ibuprofen, Motrin, Advil, naproxen, or Aleve can be used once you have finished the celebrex, or if a prescription for celebrex was not provided.    Acetaminophen (Tylenol) is an effective over-the-counter pain medication that can be used with NSAID medications  and non-acetaminophen containing narcotics such as plain oxycodone.    ASPIRIN FOR PREVENTION OF BLOOD CLOTS:   You should take one 81 mg baby aspirin twice daily for two weeks starting the evening of the day you have surgery unless instructed otherwise or taking a different blood thinner such as enoxaparin or warfarin. If you are aware that you are at high risk for a blood clot, notify your physician as soon as possible.   Take aspirin at least 30 minutes before taking ibuprofen or Toradol.    CONSTIPATION PREVENTION:   Anesthesia and pain medications, changes in eating and drinking, and less activity can all lead to constipation after surgery. To prevent or reduce constipation, take an over-the-counter stool softener (brands include Colace and Miralax).  Follow the directions on the bottle. Drink plenty of water and eat high fiber foods including whole grains, fresh fruits, vegetables, beans, prunes or prune juice.     PROBLEMS TO REPORT:  Persistent bloody drainage that soaks through reinforced dressings.  Fever greater than 101F or 38C.   Incision that is very red, swollen, draining pus, shows red streaks, or feels hot.  Inability to urinate within 8 hours of surgery (a rare effect of the anesthesia).  If you develop a rash, generalized itching or swelling from the medications, STOP the medication and call the clinic or the orthopedic surgery resident on call.    Daytime calls should be directed to the [facility] Medicine Clinic at [phone].   Night-time and weekend calls should be directed to the [facility] after [facility] nurse line at [phone].       FREQUENTLY ASKED QUESTIONS     WHAT DAILY ACTIVITIES CAN I DO?  After shoulder surgery, you may do what you feel comfortable doing in the sling.  Do not lift anything with your operative arm or put yourself at risk of falling.    CAN I DRIVE OR RIDE BY CAR/ TRAIN/ PLANE?   You should not drive while using a sling. There are no forced restrictions regarding  operating a motor vehicle, however you must always be the  of whether you are able to operate it safely. You should not drive while taking narcotic pain medications. You may ride in a car after surgery as needed. You may take a train or even fly the day after your surgery as long as you feel secure and comfortable.    WHAT ABOUT WORK?   You may return to an office-type job or to school whenever comfortable. For most patients this occurs 1-2 weeks after surgery. For more active jobs that require some lifting, you can wait until after your follow-up appointment. Any other unusual types of jobs should be discussed to determine a date for return to work.    WHAT ABOUT SWELLING?   Expect swelling as a normal process after surgery. Ice, elevation, and other treatments provided at physical therapy will allow this to improve in time. Some swelling may remain for up to 8 weeks, and this is normal.     WHAT IF IT REALLY HURTS TOO MUCH?   Surgery hurts and you cannot expect to be pain free, but our goal is for it to be tolerable. Try to use all available pain therapies such as narcotics, NSAIDS, and acetaminophen. Always try more ice and elevation. If the pain is not tolerable, call the clinic or the after hours nursing line.

## 2022-06-16 NOTE — OP NOTE
Patient Name: Miriam Jett    Date of Surgery: 6/16/2022    Medical Record #: [id number]     Pre-operative Diagnosis:  Left humeral head avascular necrosis    Post-operative Diagnosis:  Left humeral head avascular necrosis    Procedure:  Left humerus steven-arthroplasty     Primary Surgeon: Michael Ramirez MD    Implants Utilized:      Implant Name Type Inv. Item Serial No.  Lot No. LRB No. Used Action   Humeral Stem & Neck Kit, Sz1, 18mm Canal-Sparing    [employer] [id number] Left 1 Implanted   Neutral Humeral Head, 46mm X 16mm    [employer] [id number] Left 1 Implanted   Eclipse SpeedScap Implant System, [place]     [id number] Left 1 Implanted         Anesthesia:  General endotracheal anesthetic with accompanying regional block.    Complications:  None    Estimated Blood Loss: 150 mL    Indications for Procedure:  Miriam Jett is a 26 y.o. female who presents at this time with severe avascular necrosis of the humeral head and subchondral collapse.  she has persistent pain and activity derived symptoms with deterioration in function and limitations in quality of life.  she has failed conservative options at this time and presents today for elective hemiarthroplasty.    Description of Procedure:  This patient was taken to the operating room and placed in a supine position on the operating table.  The patient was correctly identified upon entry and the operative site had been marked by the operating surgeon, and this was verified.  Intravenous antibiotics were administered prior to skin incision.  At this time general endotracheal anesthesia was initiated by the attending anesthesiologist and after successful induction of anesthetic, the patient was then positioned in the modified upright beach chair position.  The head was placed at neutral, all bony prominences were well padded, and the arm was supported in an articulated arm positioner.  After timeout was called the  operative extremity was correctly identified by the operating surgeon and was subsequently sterilely prepped and draped in the usual surgical fashion and the procedure was then commenced.    A delto-pectoral approach was carried out in the standard fashion.  After an oblique skin incision was made, dissection was carried down to the deep tissues.  Hemostasis was achieved.  The deltopectoral interval was identified and the cephalic vein was mobilized laterally and protected.  The deltoid and pectoralis major muscles were mobilized and a deep self-retaining retractor was placed.  The venae comitantes were cauterized and the biceps tendon was identified and dissected out.  This included opening the rotator interval and the tissue over the bicipital groove.  We then tagged the biceps tendon with a #2 Ticron suture and it was tenotomized at the superior labrum.       Next, adhesions were released up into the subacromial space and the superior rotator cuff was visualized and found to be intact.  The conjoint tendon was mobilized medially and protected throughout the case.  The axillary nerve was identified and also protected throughout the procedure.  At this time the subscapularis was visualized and a peel was carried out.  Care was taken to avoid far inferior release of the subscapularis to preserve blood supply. A longitudinal incision was made so that only the superior 2/3 of the subscapularis was peeled.  The humerus was then positioned for preparation and retractors were placed circumferentially.  An extramedullary guide was used to approximate version but free hand cut was performed with an oscillating saw to preserve her natural version.     The head cut was measured and found to be a size 46. We then sized for the canal sparing stem and reamed centrally. Trial head was placed and found to have excellent posterior translation to the lip of the glenoid without posterior instability.  The subscapularis was easily  reduced to the lesser tuberosity osteotomy site with no untoward tension.  Satisfied with our head sizing, the humerus was then exposed again and all trial implants were removed.  The implantable stem was then opened and impacted into place achieving excellent purchase in the metaphyseal bone.      With the canal sparing implant in place, we then placed our medial row anchors for subscapularis repair. 3 medial row FiberTak anchors were placed. The final implantable head was then opened and assembled on the back table.  We then impacted the head into place, reduced the shoulder, and carried out final trialing of the construct.  Satisfied with our arthroplasty, closure was then carried out.    The subscapularis was then repaired with medial row sutures using the previously placed anchors. Simon Amandeep configuration stitches were placed and tied securely to reapproximate the attachment. Sutures from each of the medial row anchors were then loaded into a SwiveLock anchor, a  hole was drilled, and anchors were advanced to complete the double row repair. Free stitch from the lateral row anchors were used to perform the biceps tenodesis. Additional FiberWire sutures were used superiorly to reapproximate the rotator interval and inferiorly to repair the inferior 1/3 of the subscapularis.    The shoulder was again taken through a range of motion and the repair was found to be sound.  After copious irrigation we proceeded with final closure.  A layered closure was carried out utilizing 0 Ethibond sutures to re-approximate the delto-pectoral interval followed by 0 and 2-0 Vicryl sutures in a layered fashion for the skin and a sub-cuticular 3-0 prolene suture for final closure.  Steri-strips were placed and a sterile dressing was placed subsequently.  The patient was then placed in an ultra-sling device and brought into the supine position where she was successfully awakened from anesthetic, transferred to a rRoff, and  taken in stable condition to the recovery room.      There were no intraoperative complications.    POSTOPERATIVE PLAN:    Reverse Total Shoulder Arthroplasty protocol  NWFLORENCIO LUE in sling  Ok to be out of sling for pendulums, elbow, wrist ROM  ASA 81mg BID x 2wks for DVT ppx  F/u 10-14 days for wound check/suture removal      Michael Ramirez

## 2022-06-16 NOTE — PLAN OF CARE
Left supraclavicular block performed by Dr. Rosales with Lynsey BARROW and Sydni DIXON at bedside assisting, pt on continuous cardiac monitor, pt tolerated well.

## 2022-06-16 NOTE — ANESTHESIA PROCEDURE NOTES
Peripheral Block    Patient location during procedure: pre-op   Block not for primary anesthetic.  Reason for block: at surgeon's request and post-op pain management   Post-op Pain Location: Left shoulder   Start time: 6/16/2022 6:57 AM  Timeout: 6/16/2022 6:57 AM   End time: 6/16/2022 7:03 AM    Staffing  Authorizing Provider: Laila Rosales MD  Performing Provider: Laila Rosales MD    Staffing  Other anesthesia staff: Lynsey Franco CRNA  Preanesthetic Checklist  Completed: patient identified, IV checked, site marked, risks and benefits discussed, surgical consent, monitors and equipment checked, pre-op evaluation and timeout performed  Peripheral Block  Patient position: supine  Prep: ChloraPrep  Patient monitoring: heart rate, cardiac monitor, continuous pulse ox, continuous capnometry and frequent blood pressure checks  Block type: supraclavicular  Laterality: left  Injection technique: single shot  Needle  Needle type: Stimuplex   Needle gauge: 22 G  Needle length: 4 in  Needle localization: anatomical landmarks, ultrasound guidance and nerve stimulator   -ultrasound image captured on disc.  Assessment  Injection assessment: negative aspiration, negative parasthesia and local visualized surrounding nerve  Paresthesia pain: none  Heart rate change: no  Slow fractionated injection: yes  Pain Tolerance: comfortable throughout block and no complaints  Medications:    Medications: ropivacaine (NAROPIN) injection 0.5% - Perineural   20 mL - 6/16/2022 7:03:00 AM    Additional Notes  VSS.  [facility] RN monitoring vitals throughout procedure.  Patient tolerated procedure well.

## 2022-06-16 NOTE — ANESTHESIA POSTPROCEDURE EVALUATION
Anesthesia Post Evaluation    Patient: Miriam Jett    Procedure(s) Performed: Procedure(s) (LRB):  HEMIARTHROPLASTY, SHOULDER (Left)    Final Anesthesia Type: general      Patient location during evaluation: PACU  Patient participation: Yes- Able to Participate  Level of consciousness: awake and alert and oriented  Post-procedure vital signs: reviewed and stable  Pain management: adequate  Airway patency: patent    PONV status at discharge: No PONV  Anesthetic complications: no      Cardiovascular status: blood pressure returned to baseline, stable and hemodynamically stable  Respiratory status: unassisted  Hydration status: euvolemic  Follow-up not needed.          Vitals Value Taken Time   /83 06/16/22 1042   Temp 36.8 °C (98.3 °F) 06/16/22 0928   Pulse 72 06/16/22 1045   Resp 18 06/16/22 1044   SpO2 100 % 06/16/22 1045   Vitals shown include unvalidated device data.      Event Time   Out of Recovery 10:37:00         Pain/Hakan Score: Pain Rating Prior to Med Admin: 6 (6/16/2022 10:35 AM)  Hakan Score: 8 (6/16/2022 10:30 AM)

## 2022-06-17 ENCOUNTER — PATIENT MESSAGE (OUTPATIENT)
Dept: HEMATOLOGY/ONCOLOGY | Facility: CLINIC | Age: 27
End: 2022-06-17
Payer: MEDICAID

## 2022-06-17 LAB
HGB A2 MFR BLD HPLC: 3.5 % (ref 2.2–3.2)
HGB FRACT BLD ELPH-IMP: ABNORMAL
HGB FRACT BLD ELPH-IMP: ABNORMAL

## 2022-06-20 ENCOUNTER — PATIENT MESSAGE (OUTPATIENT)
Dept: HEMATOLOGY/ONCOLOGY | Facility: CLINIC | Age: 27
End: 2022-06-20
Payer: MEDICAID

## 2022-06-21 ENCOUNTER — PATIENT MESSAGE (OUTPATIENT)
Dept: ORTHOPEDICS | Facility: CLINIC | Age: 27
End: 2022-06-21
Payer: MEDICAID

## 2022-06-22 LAB — HGB FRACT BLD ELPH PH6.0-IMP: NORMAL

## 2022-06-23 ENCOUNTER — CLINICAL SUPPORT (OUTPATIENT)
Dept: REHABILITATION | Facility: HOSPITAL | Age: 27
End: 2022-06-23
Attending: STUDENT IN AN ORGANIZED HEALTH CARE EDUCATION/TRAINING PROGRAM
Payer: MEDICAID

## 2022-06-23 ENCOUNTER — TELEPHONE (OUTPATIENT)
Dept: ORTHOPEDICS | Facility: CLINIC | Age: 27
End: 2022-06-23
Payer: MEDICAID

## 2022-06-23 DIAGNOSIS — G89.29 CHRONIC LEFT SHOULDER PAIN: ICD-10-CM

## 2022-06-23 DIAGNOSIS — M25.512 CHRONIC LEFT SHOULDER PAIN: ICD-10-CM

## 2022-06-23 DIAGNOSIS — M87.019 AVASCULAR NECROSIS OF BONE OF SHOULDER: ICD-10-CM

## 2022-06-23 DIAGNOSIS — M25.612 DECREASED RANGE OF MOTION OF LEFT SHOULDER: ICD-10-CM

## 2022-06-23 PROCEDURE — 97110 THERAPEUTIC EXERCISES: CPT

## 2022-06-23 PROCEDURE — 97162 PT EVAL MOD COMPLEX 30 MIN: CPT

## 2022-06-23 NOTE — PLAN OF CARE
[facility]  Physical Therapy Initial Evaluation    Name: Miriam Jett  Clinic Number: [id number]    Therapy Diagnosis:   Encounter Diagnoses   Name Primary?    Avascular necrosis of bone of shoulder     Chronic left shoulder pain     Decreased range of motion of left shoulder      Physician: Michael Ramirez    Physician Orders: PT Eval and Treat   Medical Diagnosis from Referral: Avascular necrosis of bone of shoulder [M87.019], Chronic left shoulder pain [M25.512, G89.29]  Evaluation Date: 6/23/2022  Authorization Period Expiration: 6/1/23  Plan of Care Expiration: 9/16/22  Visit # / Visits authorized: 1/ 1  FOTO: 1/3    Time In: 8:35 am  Time Out: 9:10 am  Total Billable Time: 35 minutes    Precautions: Standard, reverse TSA protocol  DOS: 6/16/22    Subjective   Date of onset: s/p L steven-arthroplasty 6/16/22  History of current condition - Miriam reports: AVN of L shoulder and replacement done 6/16/22. Pain is being managed with medication every 4-6 hours. 7 days out of surgery. Pt has been compliant with sling.      Medical History:   Past Medical History:   Diagnosis Date    Miscarriage        Surgical History:   Miriam Jett  has a past surgical history that includes Injection of joint (Right, 7/12/2021); Core decompression of head of femur (Left, 7/13/2021); Bone graft (Left, 7/13/2021); Hip Arthroplasty (Right, 10/6/2021); and Partial arthroplasty of shoulder (Left, 6/16/2022).    Medications:   Miriam has a current medication list which includes the following prescription(s): acetaminophen, aspirin, diclofenac sodium, gabapentin, megestrol, meloxicam, naproxen, ondansetron, ondansetron, and oxycodone.    Allergies:   Review of patient's allergies indicates:  No Known Allergies       Prior Therapy: yes  Social History: Pt lives with their family  Occupation: not employed  Prior Level of Function: Ind  Current Level of Function:  Ind    Pain:   Current 0/10, worst 9/10, best 0/10   Location: left shoulder   Description: Aching and Throbbing  Aggravating Factors: movement  Easing Factors: pain medication and rest    Pts goals: to use left arm like normal    Objective       Range of Motion/Strength:     Shoulder Right Left Pain/Dysfunction with Movement   AROM      flexion 160 NT    abduction 160 NT    ER at 0° abd 60 NT        U/E MMT Right Left Pain/Dysfunction with Movement   Shoulder Flexion 5/5 NT    Shoulder Abduction 5/5 NT    Shoulder IR 5/5 NT    Shoulder ER  @ 0* Abduction 5/5 NT    Elbow Flexion  5/5 3+/5    Elbow Extension 5/5 3+/5        CMS Impairment/Limitation/Restriction for FOTO Shoulder Survey    Therapist reviewed FOTO scores for Miriam Jett on 6/23/2022.   FOTO documents entered into EPIC - see Media section.    Limitation Score: 96%  Category: Mobility         TREATMENT   Treatment Time In: 9:00  Treatment Time Out: 9:10  Total Treatment time separate from Evaluation: 10 minutes    Miriam received therapeutic exercises to develop strength, endurance and ROM for 10 minutes including:    Wrist ext  Wrist flex  Supination/pronation  Elbow flex/ext  Pendulums    NEXT: shoulder abd iso, wand extension, supine wand flexion          Home Exercises Provided and Patient Education Provided     Education provided:   - HEP    Written Home Exercises Provided: yes.  Exercises were reviewed and Miriam was able to demonstrate them prior to the end of the session.  Miriam demonstrated good  understanding of the education provided.     See EMR under Patient Instructions for exercises provided 6/23/2022.      Assessment   Miriam is a 26 y.o. female referred to outpatient Physical Therapy with a medical diagnosis of AVN of left shoulder. Pt presents with decreased left shoulder range of motion, impaired strength of LUE and difficulty with ADLs.    Pt prognosis is Good.   Pt will benefit from skilled outpatient Physical  Therapy to address the deficits stated above and in the chart below, provide pt/family education, and to maximize pt's level of independence.     Plan of care discussed with patient: Yes  Pt's spiritual, cultural and educational needs considered and patient is agreeable to the plan of care and goals as stated below:     Anticipated Barriers for therapy: none    Medical Necessity is demonstrated by the following  History  Co-morbidities and personal factors that may impact the plan of care Co-morbidities:   AVN hx    Personal Factors:   no deficits     low   Examination  Body Structures and Functions, activity limitations and participation restrictions that may impact the plan of care Body Regions:   upper extremities    Body Systems:    gross symmetry  ROM  strength  gross coordinated movement  scar formation  skin integrity    Participation Restrictions:   none    Activity limitations:     Mobility  lifting and carrying objects  fine hand use (grasping/picking up)  driving (bike, car, motorcycle)    Self care  washing oneself (bathing, drying, washing hands)  caring for body parts (brushing teeth, shaving, grooming)  dressing    Domestic Life  shopping  cooking  doing house work (cleaning house, washing dishes, laundry)    Interactions/Relationships  no deficits    Life Areas  employment    Community and Social Life  no deficits         high   Clinical Presentation evolving clinical presentation with changing clinical characteristics moderate   Decision Making/ Complexity Score: moderate       Goals:  Short Term Goals: 6 weeks   1. Patient will be IND with HEP.    Long Term Goals: 12 weeks   1. Patient will improve left shoulder flexion ROM to 150 degrees.  2. Pt will improve left shoulder ER ROM to 60 degrees.  3. Pt will improve LUE MMT to 4+/5.  4. Pt demo improvements in FOTO score to 42% limited or less.     Plan   Plan of care Certification: 6/23/2022 to 9/16/22.    Outpatient Physical Therapy 2 times weekly  for 12 weeks to include the following interventions: Electrical Stimulation TENS, Manual Therapy, Moist Heat/ Ice, Neuromuscular Re-ed, Patient Education, Therapeutic Activities and Therapeutic Exercise, ASTYM, Kinesiotaping PRN, Functional Dry Needling    Karen Martinez PT, DPT

## 2022-06-23 NOTE — TELEPHONE ENCOUNTER
Returned patient's call regarding message below. Patient states that she is needing something stating how long she will be out of work for her benefits.    I let her know that the staff and Dr. Ramirez are out today and that I would pass the message along to them.      ----- Message from Felicitas Ann sent at 6/23/2022 10:39 AM CDT -----  Contact: Self [phone]  Would like to receive medical advice.    Would they like a call back or a response via [facility]:  call back    Additional information: Calling to request a sooner appt if possible for social security reasons.

## 2022-06-28 ENCOUNTER — TELEPHONE (OUTPATIENT)
Dept: RADIOLOGY | Facility: HOSPITAL | Age: 27
End: 2022-06-28
Payer: MEDICAID

## 2022-06-28 DIAGNOSIS — M87.019 AVASCULAR NECROSIS OF BONE OF SHOULDER: Primary | ICD-10-CM

## 2022-06-28 DIAGNOSIS — D68.9 COAGULATION DEFECT: ICD-10-CM

## 2022-06-28 NOTE — TELEPHONE ENCOUNTER
Called pt to confirm radiology procedure appointment 6/29/22 at 09:30.  Advised pt to arrive to [facility] on [address] at 08:00 and be NPO after midnight tonight. OK to take BP meds in AM with small sip of water. Per patient last dose of 81 mg aspirin was this morning 6/28/22. Advised pt not to take dose tomorrow morning. Pt verbalized understanding and all questions answered.  Confirmed with patient they have a ride home post-procedure.

## 2022-06-29 ENCOUNTER — HOSPITAL ENCOUNTER (OUTPATIENT)
Dept: RADIOLOGY | Facility: HOSPITAL | Age: 27
Discharge: HOME | End: 2022-06-29
Attending: NURSE PRACTITIONER
Payer: MEDICAID

## 2022-06-29 VITALS
HEIGHT: 67 IN | RESPIRATION RATE: 18 BRPM | BODY MASS INDEX: 27.47 KG/M2 | WEIGHT: 175 LBS | SYSTOLIC BLOOD PRESSURE: 129 MMHG | DIASTOLIC BLOOD PRESSURE: 78 MMHG | HEART RATE: 70 BPM | OXYGEN SATURATION: 100 %

## 2022-06-29 PROCEDURE — 88189 PR  FLOWCYTOMETRY/READ, 16 & > MARKERS: ICD-10-PCS | Mod: ,,, | Performed by: PATHOLOGY

## 2022-06-29 PROCEDURE — 88185 FLOWCYTOMETRY/TC ADD-ON: CPT | Performed by: PATHOLOGY

## 2022-06-29 PROCEDURE — 85097 BONE MARROW INTERPRETATION: CPT | Mod: ,,, | Performed by: PATHOLOGY

## 2022-06-29 PROCEDURE — 88305 TISSUE EXAM BY PATHOLOGIST: CPT | Mod: 59 | Performed by: PATHOLOGY

## 2022-06-29 PROCEDURE — 88313 SPECIAL STAINS GROUP 2: CPT | Performed by: PATHOLOGY

## 2022-06-29 PROCEDURE — 88184 FLOWCYTOMETRY/ TC 1 MARKER: CPT | Performed by: PATHOLOGY

## 2022-06-29 PROCEDURE — 88313 SPECIAL STAINS GROUP 2: CPT | Mod: 26,,, | Performed by: PATHOLOGY

## 2022-06-29 PROCEDURE — 88313 PR  SPECIAL STAINS,GROUP II: ICD-10-PCS | Mod: 26,,, | Performed by: PATHOLOGY

## 2022-06-29 PROCEDURE — C1830 POWER BONE MARROW BX NEEDLE: HCPCS

## 2022-06-29 PROCEDURE — 88264 CHROMOSOME ANALYSIS 20-25: CPT | Performed by: NURSE PRACTITIONER

## 2022-06-29 PROCEDURE — 88189 FLOWCYTOMETRY/READ 16 & >: CPT | Mod: ,,, | Performed by: PATHOLOGY

## 2022-06-29 PROCEDURE — 77012 CT SCAN FOR NEEDLE BIOPSY: CPT | Mod: TC

## 2022-06-29 PROCEDURE — 88311 DECALCIFY TISSUE: CPT | Performed by: PATHOLOGY

## 2022-06-29 PROCEDURE — 63600175 PHARM REV CODE 636 W HCPCS: Performed by: STUDENT IN AN ORGANIZED HEALTH CARE EDUCATION/TRAINING PROGRAM

## 2022-06-29 PROCEDURE — 88311 DECALCIFY TISSUE: CPT | Mod: 26,,, | Performed by: PATHOLOGY

## 2022-06-29 PROCEDURE — 85097 PR  BONE MARROW,SMEAR INTERPRETATION: ICD-10-PCS | Mod: ,,, | Performed by: PATHOLOGY

## 2022-06-29 PROCEDURE — 88237 TISSUE CULTURE BONE MARROW: CPT | Performed by: NURSE PRACTITIONER

## 2022-06-29 PROCEDURE — 88305 TISSUE EXAM BY PATHOLOGIST: ICD-10-PCS | Mod: 26,,, | Performed by: PATHOLOGY

## 2022-06-29 PROCEDURE — 88311 PR  DECALCIFY TISSUE: ICD-10-PCS | Mod: 26,,, | Performed by: PATHOLOGY

## 2022-06-29 PROCEDURE — 88305 TISSUE EXAM BY PATHOLOGIST: CPT | Mod: 26,,, | Performed by: PATHOLOGY

## 2022-06-29 RX ORDER — MIDAZOLAM HYDROCHLORIDE 1 MG/ML
INJECTION INTRAMUSCULAR; INTRAVENOUS CODE/TRAUMA/SEDATION MEDICATION
Status: COMPLETED | OUTPATIENT
Start: 2022-06-29 | End: 2022-06-29

## 2022-06-29 RX ORDER — FENTANYL CITRATE 50 UG/ML
INJECTION, SOLUTION INTRAMUSCULAR; INTRAVENOUS CODE/TRAUMA/SEDATION MEDICATION
Status: COMPLETED | OUTPATIENT
Start: 2022-06-29 | End: 2022-06-29

## 2022-06-29 RX ADMIN — FENTANYL CITRATE 50 MCG: 50 INJECTION, SOLUTION INTRAMUSCULAR; INTRAVENOUS at 12:06

## 2022-06-29 RX ADMIN — MIDAZOLAM HYDROCHLORIDE 1 MG: 1 INJECTION INTRAMUSCULAR; INTRAVENOUS at 12:06

## 2022-06-29 NOTE — PLAN OF CARE
Patient received from procedure area AAOx4 in supine position. Bandaid noted to lower back C/D/I. Report received from DESTINY Silverman. Will continue to monitor.

## 2022-06-29 NOTE — SEDATION DOCUMENTATION
Procedure completed at this time. VSS, NADN, and pt tolerated procedure well. Band aid to right posterior iliac crest puncture site C/D/I.

## 2022-06-29 NOTE — DISCHARGE SUMMARY
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     The patient tolerated the procedure well and there were no complications.      Sterile technique was performed in the right iliac, lidocaine was used as a local anesthetic.  Multiple samples taken percutaneously from the right iliac bone.  Pt tolerated the procedure well without immediate complications.  Please see radiologist report for details. F/u with PCP and/or ordering physician.

## 2022-06-29 NOTE — PLAN OF CARE
Discharge instructions and AVS given to and reviewed with patient. Understanding verbalized. Mother, Yessi, at bedside.

## 2022-06-29 NOTE — PLAN OF CARE
[facility] Covid-19 rapid test done per protocol. results negative after 15minutes. Patient aware of results.

## 2022-06-29 NOTE — SEDATION DOCUMENTATION
Pt placed on CT table at this time. CM in place, VSS, NADN, and pt verbalizes understanding of procedure.

## 2022-06-29 NOTE — SEDATION DOCUMENTATION
Pt transferred to stretcher and transported back to recovery area at this time. Report to Rober DIXON and verbalized understanding of all.

## 2022-06-30 ENCOUNTER — PATIENT MESSAGE (OUTPATIENT)
Dept: HEMATOLOGY/ONCOLOGY | Facility: CLINIC | Age: 27
End: 2022-06-30
Payer: MEDICAID

## 2022-06-30 DIAGNOSIS — M25.512 LEFT SHOULDER PAIN, UNSPECIFIED CHRONICITY: Primary | ICD-10-CM

## 2022-06-30 LAB
BODY SITE - BONE MARROW: NORMAL
CLINICAL DIAGNOSIS - BONE MARROW: NORMAL
FLOW CYTOMETRY ANTIBODIES ANALYZED - BONE MARROW: NORMAL
FLOW CYTOMETRY COMMENT - BONE MARROW: NORMAL
FLOW CYTOMETRY INTERPRETATION - BONE MARROW: NORMAL

## 2022-07-01 ENCOUNTER — HOSPITAL ENCOUNTER (OUTPATIENT)
Dept: RADIOLOGY | Facility: HOSPITAL | Age: 27
Discharge: HOME | End: 2022-07-01
Attending: STUDENT IN AN ORGANIZED HEALTH CARE EDUCATION/TRAINING PROGRAM
Payer: MEDICAID

## 2022-07-01 ENCOUNTER — OFFICE VISIT (OUTPATIENT)
Dept: ORTHOPEDICS | Facility: CLINIC | Age: 27
End: 2022-07-01
Payer: MEDICAID

## 2022-07-01 VITALS — HEIGHT: 67 IN | BODY MASS INDEX: 27.47 KG/M2 | WEIGHT: 175 LBS

## 2022-07-01 DIAGNOSIS — M87.019 AVASCULAR NECROSIS OF BONE OF SHOULDER: Primary | ICD-10-CM

## 2022-07-01 DIAGNOSIS — M25.512 LEFT SHOULDER PAIN, UNSPECIFIED CHRONICITY: ICD-10-CM

## 2022-07-01 PROCEDURE — 99213 OFFICE O/P EST LOW 20 MIN: CPT | Mod: PBBFAC | Performed by: STUDENT IN AN ORGANIZED HEALTH CARE EDUCATION/TRAINING PROGRAM

## 2022-07-01 PROCEDURE — 99024 POSTOP FOLLOW-UP VISIT: CPT | Mod: ,,, | Performed by: STUDENT IN AN ORGANIZED HEALTH CARE EDUCATION/TRAINING PROGRAM

## 2022-07-01 PROCEDURE — 99024 PR POST-OP FOLLOW-UP VISIT: ICD-10-PCS | Mod: ,,, | Performed by: STUDENT IN AN ORGANIZED HEALTH CARE EDUCATION/TRAINING PROGRAM

## 2022-07-01 PROCEDURE — 99999 PR PBB SHADOW E&M-EST. PATIENT-LVL III: ICD-10-PCS | Mod: PBBFAC,,, | Performed by: STUDENT IN AN ORGANIZED HEALTH CARE EDUCATION/TRAINING PROGRAM

## 2022-07-01 PROCEDURE — 1160F PR REVIEW ALL MEDS BY PRESCRIBER/CLIN PHARMACIST DOCUMENTED: ICD-10-PCS | Mod: CPTII,,, | Performed by: STUDENT IN AN ORGANIZED HEALTH CARE EDUCATION/TRAINING PROGRAM

## 2022-07-01 PROCEDURE — 73030 X-RAY EXAM OF SHOULDER: CPT | Mod: TC,LT

## 2022-07-01 PROCEDURE — 1159F MED LIST DOCD IN RCRD: CPT | Mod: CPTII,,, | Performed by: STUDENT IN AN ORGANIZED HEALTH CARE EDUCATION/TRAINING PROGRAM

## 2022-07-01 PROCEDURE — 1160F RVW MEDS BY RX/DR IN RCRD: CPT | Mod: CPTII,,, | Performed by: STUDENT IN AN ORGANIZED HEALTH CARE EDUCATION/TRAINING PROGRAM

## 2022-07-01 PROCEDURE — 99999 PR PBB SHADOW E&M-EST. PATIENT-LVL III: CPT | Mod: PBBFAC,,, | Performed by: STUDENT IN AN ORGANIZED HEALTH CARE EDUCATION/TRAINING PROGRAM

## 2022-07-01 PROCEDURE — 1159F PR MEDICATION LIST DOCUMENTED IN MEDICAL RECORD: ICD-10-PCS | Mod: CPTII,,, | Performed by: STUDENT IN AN ORGANIZED HEALTH CARE EDUCATION/TRAINING PROGRAM

## 2022-07-01 PROCEDURE — 3008F BODY MASS INDEX DOCD: CPT | Mod: CPTII,,, | Performed by: STUDENT IN AN ORGANIZED HEALTH CARE EDUCATION/TRAINING PROGRAM

## 2022-07-01 PROCEDURE — 3008F PR BODY MASS INDEX (BMI) DOCUMENTED: ICD-10-PCS | Mod: CPTII,,, | Performed by: STUDENT IN AN ORGANIZED HEALTH CARE EDUCATION/TRAINING PROGRAM

## 2022-07-01 PROCEDURE — 73030 X-RAY EXAM OF SHOULDER: CPT | Mod: 26,LT,, | Performed by: RADIOLOGY

## 2022-07-01 PROCEDURE — 73030 XR SHOULDER COMPLETE 2 OR MORE VIEWS LEFT: ICD-10-PCS | Mod: 26,LT,, | Performed by: RADIOLOGY

## 2022-07-01 NOTE — PROGRESS NOTES
Orthopaedics  Post-operative follow-up    Procedure Performed:   1.  Left humerus steven-arthroplasty     Date of Surgery: 6/16/22    Subjective: Miriam Jett is now almost 2 weeks out from her shoulder surgery.  She is doing well with no specific complaints other than the expected post-operative pain and stiffness.  She has been compliant with post-operative restrictions.  Overall her pain has been very well controlled and she is happy with her results so far.      Exam:  Sutures removed, incision site benign with no drainage or redness  Mild bruising as anticipated  ROM fluid, will be formally assessed at next visit  Axillary nerve sensation and motor intact  Motor and sensory intact distally  Strong radial pulse, fingers warm and well perfused    Imaging:  X-Ray Shoulder 2 or More Views Left  Narrative: EXAMINATION:  XR SHOULDER COMPLETE 2 OR MORE VIEWS LEFT    CLINICAL HISTORY:  Pain in left shoulder    TECHNIQUE:  Two or three views of the left shoulder were performed.    COMPARISON:  Left shoulder radiographs May 3, 2022    FINDINGS:  Left humeral head arthroplasty appears unchanged without evidence of periprosthetic fracture or osteolysis.  Shoulder alignment is within normal limits.  Left acromioclavicular joint appears normal.  There is been interval resolution of the postoperative soft tissue gas previously seen about the left shoulder.  Visualized lung is clear.  Impression: As above.    Electronically signed by: Benson Santos  Date:    07/01/2022  Time:    11:21    Physician Read: I agree with the above impression    Impression:  S/P left shoulder steven-arthoplasty, initial post-operative visit - doing well    Plan:  Discussed surgical findings, operative procedure  Reviewed post-operative instructions, restrictions, and rehabilitation  Provided PT script and protocol - will begin PT, focus on passive ROM initially  Symptomatic treatment for pain / swelling  Instructed patient to call clinic  if questions or concerns    Of note she recently had a bone marrow biopsy and has an upcoming follow-up with hematologist to try and find a diagnosis for the reason she has such severe avascular necrosis.    Follow-up in 1 month at 6 weeks post-op    Work status:  For weeks 0-4 from now:  1) Sling immobilization at all times, one armed work (other than typing)  2) Allow time for physical therapy    For weeks 4-8 from now:  1) Discontinue sling  2) Continue physical therapy  3) No lifting/pushing/pulling greater than 2 pounds  4) No overhead work  5) No repetitive motions        Michael Ramirez MD    I, Flakito Sommer, acted as a scribe for Michael Ramirez MD for the duration of this office visit.

## 2022-07-04 ENCOUNTER — PATIENT MESSAGE (OUTPATIENT)
Dept: ORTHOPEDICS | Facility: CLINIC | Age: 27
End: 2022-07-04
Payer: MEDICAID

## 2022-07-05 ENCOUNTER — DOCUMENTATION ONLY (OUTPATIENT)
Dept: REHABILITATION | Facility: HOSPITAL | Age: 27
End: 2022-07-05

## 2022-07-05 ENCOUNTER — CLINICAL SUPPORT (OUTPATIENT)
Dept: REHABILITATION | Facility: HOSPITAL | Age: 27
End: 2022-07-05
Payer: MEDICAID

## 2022-07-05 DIAGNOSIS — M25.612 DECREASED RANGE OF MOTION OF LEFT SHOULDER: Primary | ICD-10-CM

## 2022-07-05 PROCEDURE — 97110 THERAPEUTIC EXERCISES: CPT | Mod: CQ

## 2022-07-05 NOTE — PROGRESS NOTES
PT/PTA met face to face to discuss pt's treatment plan and progress towards established goals. Pt will be seen by a physical therapist minimally every 6th visit or every 30 days.        Fern Lala PTA

## 2022-07-05 NOTE — PROGRESS NOTES
[facility]   Physical Therapist Assistant Treatment Note     Name: Miriam Jett  Clinic Number: [id number]    Therapy Diagnosis:   Encounter Diagnosis   Name Primary?    Decreased range of motion of left shoulder Yes     Physician: Michael Ramirez    Visit Date: 7/5/2022   Physician Orders: PT Eval and Treat   Medical Diagnosis from Referral: Avascular necrosis of bone of shoulder [M87.019], Chronic left shoulder pain [M25.512, G89.29]  Evaluation Date: 6/23/2022  Authorization Period Expiration: 6/1/23  Plan of Care Expiration: 9/16/22  Visit # / Visits authorized: 1/ 24 + eval    FOTO: 1/3    Precautions: Standard, reverse TSA protocol  DOS: 6/16/22    PTA Visit #: 1/5     Time In: 8:15  Time Out: 9:00  Total Billable Time: 45 minutes    SUBJECTIVE     Pt reports: doing well and feeling better.  She was compliant with home exercise program.  Response to previous treatment: no issues  Functional change: na at this time     Pain: 0/10 no pain meds  Location: --    OBJECTIVE     Objective Measures updated at progress report unless specified.     Treatment     Miriam received the treatments listed below:      therapeutic exercises to develop strength, endurance, ROM, flexibility and posture for 30 minutes including:  Codman's   Supine scap depression  Supine dowel press up into flexion as ladonna limited to 130 degrees  Supine dowel ext rotation as ladonna limited to 25 degrees  Standing Deltoid isometrics  Seated Scap retractions   Seated and standing shoulder shrugs  Seated  strength w/hand exerciser setting on 3rd hole         manual therapy techniques:  for 15 minutes, including:  STM to biceps, upper traps, levator, patsy scap mm  Scap mobs  PROM shoulder flexion and ext rotation as per protocol  PROM elbow extension     supervised modalities after being cleared for contradictions: --    hot pack for 0 minutes to --.    cold pack for 0 minutes to --.        Patient  Education and Home Exercises     Home Exercises Provided and Patient Education Provided     Education provided:   - HEP review    Written Home Exercises Provided: Patient instructed to cont prior HEP. Exercises were reviewed and Miriam was able to demonstrate them prior to the end of the session.  Miriam demonstrated good  understanding of the education provided. See EMR under Patient Instructions for exercises provided during therapy sessions    ASSESSMENT     Miriam presented to therapy today with her soft sling donned and no complaints of pain. Initiated soft tissue mobilization to reduce muscle tension which was greatest in her distal biceps and along the distal medial scapula border. Her passive range of motion was within protocol limits with mild pain. She lacks full elbow extension actively however she has full range with manual.  Miriam demonstrated a positive response to her first session after eval as evidenced with her activity tolerance/progression. Continued skilled intervention indicated to progress her range of motion and strength as per physician protocol for daily activities.   Miriam Is progressing well towards her goals.   Pt prognosis is Good.     Pt will continue to benefit from skilled outpatient physical therapy to address the deficits listed in the problem list box on initial evaluation, provide pt/family education and to maximize pt's level of independence in the home and community environment.     Pt's spiritual, cultural and educational needs considered and pt agreeable to plan of care and goals.     Anticipated barriers to physical therapy: none    Goals:   Short Term Goals: 6 weeks   1. Patient will be IND with HEP.     Long Term Goals: 12 weeks   1. Patient will improve left shoulder flexion ROM to 150 degrees.  2. Pt will improve left shoulder ER ROM to 60 degrees.  3. Pt will improve LUE MMT to 4+/5.  4. Pt demo improvements in FOTO score to 42% limited or less.        PLAN      Plan of care Certification: 6/23/2022 to 9/16/22.     Outpatient Physical Therapy 2 times weekly for 12 weeks to include the following interventions: Electrical Stimulation TENS, Manual Therapy, Moist Heat/ Ice, Neuromuscular Re-ed, Patient Education, Therapeutic Activities and Therapeutic Exercise, ASTYM, Kinesiotaping PRN, Functional Dry Needling    Fern Lala, PTA

## 2022-07-06 ENCOUNTER — PATIENT MESSAGE (OUTPATIENT)
Dept: HEMATOLOGY/ONCOLOGY | Facility: CLINIC | Age: 27
End: 2022-07-06
Payer: MEDICAID

## 2022-07-06 DIAGNOSIS — E61.1 IRON DEFICIENCY: Primary | ICD-10-CM

## 2022-07-06 RX ORDER — FERROUS SULFATE 325(65) MG
TABLET ORAL
Qty: 90 TABLET | Refills: 1 | Status: SHIPPED | OUTPATIENT
Start: 2022-07-06 | End: 2023-02-09

## 2022-07-07 ENCOUNTER — CLINICAL SUPPORT (OUTPATIENT)
Dept: REHABILITATION | Facility: HOSPITAL | Age: 27
End: 2022-07-07
Payer: MEDICAID

## 2022-07-07 DIAGNOSIS — M25.612 DECREASED RANGE OF MOTION OF LEFT SHOULDER: Primary | ICD-10-CM

## 2022-07-07 PROCEDURE — 97110 THERAPEUTIC EXERCISES: CPT | Mod: CQ

## 2022-07-07 NOTE — PROGRESS NOTES
[facility]   Physical Therapist Assistant Treatment Note     Name: Miriam Jett  Clinic Number: [id number]    Therapy Diagnosis:   Encounter Diagnosis   Name Primary?    Decreased range of motion of left shoulder Yes     Physician: Michael Ramirez    Visit Date: 7/7/2022   Physician Orders: PT Eval and Treat   Medical Diagnosis from Referral: Avascular necrosis of bone of shoulder [M87.019], Chronic left shoulder pain [M25.512, G89.29]  Evaluation Date: 6/23/2022  Authorization Period Expiration: 6/1/23  Plan of Care Expiration: 9/16/22  Visit # / Visits authorized: 2/ 24 + eval    FOTO: 1/3    Precautions: Standard, reverse TSA protocol  DOS: 6/16/22    PTA Visit #: 2/5     Time In: 9:30  Time Out: 10:15  Total Billable Time: 45 minutes    SUBJECTIVE     Pt reports: doing well  She was compliant with home exercise program.  Response to previous treatment: no issues  Functional change: na at this time     Pain: 0/10 no pain meds  Location: --    OBJECTIVE     Objective Measures updated at progress report unless specified.     Treatment     Miriam received the treatments listed below:      therapeutic exercises to develop strength, endurance, ROM, flexibility and posture for 30 minutes including:  Codman's   Supine scap depression  Supine dowel press up into flexion as ladonna limited to 130 degrees  Supine dowel ext rotation as ladonan limited to 25 degrees  Standing dowel extension  Deltoid isometrics  Seated Scap retractions   Standing shoulder shrugs and circles    manual therapy techniques:  for 15 minutes, including:  STM to biceps, deltoids, upper traps, levator  PROM shoulder flexion and ext rotation as per protocol  PROM elbow extension     supervised modalities after being cleared for contradictions: --    hot pack for 0 minutes to --.    cold pack for 0 minutes to --.        Patient Education and Home Exercises     Home Exercises Provided and Patient Education  Provided     Education provided:   - HEP review    Written Home Exercises Provided: Patient instructed to cont prior HEP. Exercises were reviewed and Miriam was able to demonstrate them prior to the end of the session.  Miriam demonstrated good  understanding of the education provided. See EMR under Patient Instructions for exercises provided during therapy sessions    ASSESSMENT     Miriam presented to therapy today with her soft sling donned and no complaints of pain. Initiated soft tissue mobilization to reduce muscle tension which was greatest in her distal and mid biceps . Her passive range of motion was within protocol limits with only mild pain mainly with external rotation. Her elbow extension improved today from previous session. Miriam demonstrated a positive response to her session today as evidenced with her activity tolerance/progression. Continued skilled intervention indicated to progress her range of motion and strength as per physician protocol for daily activities.   Miriam Is progressing well towards her goals.   Pt prognosis is Good.     Pt will continue to benefit from skilled outpatient physical therapy to address the deficits listed in the problem list box on initial evaluation, provide pt/family education and to maximize pt's level of independence in the home and community environment.     Pt's spiritual, cultural and educational needs considered and pt agreeable to plan of care and goals.     Anticipated barriers to physical therapy: none    Goals:   Short Term Goals: 6 weeks   1. Patient will be IND with HEP.     Long Term Goals: 12 weeks   1. Patient will improve left shoulder flexion ROM to 150 degrees.  2. Pt will improve left shoulder ER ROM to 60 degrees.  3. Pt will improve LUE MMT to 4+/5.  4. Pt demo improvements in FOTO score to 42% limited or less.        PLAN     Plan of care Certification: 6/23/2022 to 9/16/22.     Outpatient Physical Therapy 2 times weekly for 12  weeks to include the following interventions: Electrical Stimulation TENS, Manual Therapy, Moist Heat/ Ice, Neuromuscular Re-ed, Patient Education, Therapeutic Activities and Therapeutic Exercise, ASTYM, Kinesiotaping PRN, Functional Dry Needling    Fern Lala, PTA

## 2022-07-08 ENCOUNTER — PATIENT MESSAGE (OUTPATIENT)
Dept: ORTHOPEDICS | Facility: CLINIC | Age: 27
End: 2022-07-08
Payer: MEDICAID

## 2022-07-11 LAB
CHROM BANDING METHOD: NORMAL
CHROMOSOME ANALYSIS BM ADDITIONAL INFORMATION: NORMAL
CHROMOSOME ANALYSIS BM RELEASED BY: NORMAL
CHROMOSOME ANALYSIS BM RESULT SUMMARY: NORMAL
CLINICAL CYTOGENETICIST REVIEW: NORMAL
KARYOTYP MAR: NORMAL
REASON FOR REFERRAL (NARRATIVE): NORMAL
REF LAB TEST METHOD: NORMAL
SPECIMEN SOURCE: NORMAL
SPECIMEN: NORMAL

## 2022-07-14 ENCOUNTER — LAB VISIT (OUTPATIENT)
Dept: LAB | Facility: HOSPITAL | Age: 27
End: 2022-07-14
Attending: NURSE PRACTITIONER
Payer: MEDICAID

## 2022-07-14 ENCOUNTER — CLINICAL SUPPORT (OUTPATIENT)
Dept: REHABILITATION | Facility: HOSPITAL | Age: 27
End: 2022-07-14
Payer: MEDICAID

## 2022-07-14 ENCOUNTER — PATIENT MESSAGE (OUTPATIENT)
Dept: HEMATOLOGY/ONCOLOGY | Facility: CLINIC | Age: 27
End: 2022-07-14
Payer: MEDICAID

## 2022-07-14 DIAGNOSIS — M87.052 AVASCULAR NECROSIS OF BONE OF LEFT HIP: ICD-10-CM

## 2022-07-14 DIAGNOSIS — M87.051 AVASCULAR NECROSIS OF BONE OF RIGHT HIP: ICD-10-CM

## 2022-07-14 DIAGNOSIS — M25.612 DECREASED RANGE OF MOTION OF LEFT SHOULDER: Primary | ICD-10-CM

## 2022-07-14 DIAGNOSIS — M87.051 AVASCULAR NECROSIS OF BONE OF RIGHT HIP: Primary | ICD-10-CM

## 2022-07-14 PROCEDURE — 84165 PROTEIN E-PHORESIS SERUM: CPT | Mod: 26,,, | Performed by: PATHOLOGY

## 2022-07-14 PROCEDURE — 86334 IMMUNOFIX E-PHORESIS SERUM: CPT | Performed by: NURSE PRACTITIONER

## 2022-07-14 PROCEDURE — 86334 IMMUNOFIX E-PHORESIS SERUM: CPT | Mod: 26,,, | Performed by: PATHOLOGY

## 2022-07-14 PROCEDURE — 97110 THERAPEUTIC EXERCISES: CPT

## 2022-07-14 PROCEDURE — 84165 PROTEIN E-PHORESIS SERUM: CPT | Performed by: NURSE PRACTITIONER

## 2022-07-14 PROCEDURE — 86334 PATHOLOGIST INTERPRETATION IFE: ICD-10-PCS | Mod: 26,,, | Performed by: PATHOLOGY

## 2022-07-14 PROCEDURE — 83520 IMMUNOASSAY QUANT NOS NONAB: CPT | Mod: 59 | Performed by: NURSE PRACTITIONER

## 2022-07-14 PROCEDURE — 84165 PATHOLOGIST INTERPRETATION SPE: ICD-10-PCS | Mod: 26,,, | Performed by: PATHOLOGY

## 2022-07-14 PROCEDURE — 36415 COLL VENOUS BLD VENIPUNCTURE: CPT | Performed by: NURSE PRACTITIONER

## 2022-07-14 NOTE — PROGRESS NOTES
[facility]   Physical Therapist Treatment Note     Name: Miriam Jett  Clinic Number: [id number]    Therapy Diagnosis:   Encounter Diagnosis   Name Primary?    Decreased range of motion of left shoulder Yes     Physician: Michael Ramirez    Visit Date: 7/14/2022   Physician Orders: PT Eval and Treat   Medical Diagnosis from Referral: Avascular necrosis of bone of shoulder [M87.019], Chronic left shoulder pain [M25.512, G89.29]  Evaluation Date: 6/23/2022  Authorization Period Expiration: 6/1/23  Plan of Care Expiration: 9/16/22  Visit # / Visits authorized: 3/ 24 + eval    FOTO: 1/3    Precautions: Standard, reverse TSA protocol  DOS: 6/16/22    PTA Visit #:     Time In: 8:10 am  Time Out: 8 40 am   Total Billable Time: 30 minutes    SUBJECTIVE     Pt reports: no pain and shoulder feeling much better.  She was compliant with home exercise program.  Response to previous treatment: no issues  Functional change: na at this time     Pain: 0/10 no pain meds  Location: --    OBJECTIVE     Objective Measures updated at progress report unless specified.     Treatment     Miriam received the treatments listed below:      therapeutic exercises to develop strength, endurance, ROM, flexibility and posture for 20 minutes including:  Codman's   Supine scap depression  Supine dowel press up into flexion as ladonna limited to 130 degrees  Supine dowel ext rotation as ladonna limited to 25 degrees  Standing dowel extension  Deltoid isometrics  Seated Scap retractions   Standing shoulder shrugs and circles    manual therapy techniques:  for 10 minutes, including:  PROM shoulder flexion and ext rotation as per protocol  PROM elbow extension        Patient Education and Home Exercises     Home Exercises Provided and Patient Education Provided     Education provided:   - HEP review    Written Home Exercises Provided: Patient instructed to cont prior HEP. Exercises were reviewed and Miriam was  able to demonstrate them prior to the end of the session.  Miriam demonstrated good  understanding of the education provided. See EMR under Patient Instructions for exercises provided during therapy sessions    ASSESSMENT     Miriam presented to therapy today with her soft sling donned and no complaints of pain. Pt tolerated well with exercise interventions with no adverse effect. Able to complete exercises with minimal cueing.   Miriam Is progressing well towards her goals.   Pt prognosis is Good.     Pt will continue to benefit from skilled outpatient physical therapy to address the deficits listed in the problem list box on initial evaluation, provide pt/family education and to maximize pt's level of independence in the home and community environment.     Pt's spiritual, cultural and educational needs considered and pt agreeable to plan of care and goals.     Anticipated barriers to physical therapy: none    Goals:   Short Term Goals: 6 weeks   1. Patient will be IND with HEP.     Long Term Goals: 12 weeks   1. Patient will improve left shoulder flexion ROM to 150 degrees.  2. Pt will improve left shoulder ER ROM to 60 degrees.  3. Pt will improve LUE MMT to 4+/5.  4. Pt demo improvements in FOTO score to 42% limited or less.        PLAN     Plan of care Certification: 6/23/2022 to 9/16/22.     Outpatient Physical Therapy 2 times weekly for 12 weeks to include the following interventions: Electrical Stimulation TENS, Manual Therapy, Moist Heat/ Ice, Neuromuscular Re-ed, Patient Education, Therapeutic Activities and Therapeutic Exercise, ASTYM, Kinesiotaping PRN, Functional Dry Needling    Karen Martinez, PT

## 2022-07-14 NOTE — TELEPHONE ENCOUNTER
Schedule her for spep, marilyn, flc.  This doesn't look like it was done.  I am going to reach out to Dr. Jang for further recommendations.

## 2022-07-15 LAB
ALBUMIN SERPL ELPH-MCNC: 3.89 G/DL (ref 3.35–5.55)
ALPHA1 GLOB SERPL ELPH-MCNC: 0.39 G/DL (ref 0.17–0.41)
ALPHA2 GLOB SERPL ELPH-MCNC: 0.84 G/DL (ref 0.43–0.99)
B-GLOBULIN SERPL ELPH-MCNC: 0.84 G/DL (ref 0.5–1.1)
GAMMA GLOB SERPL ELPH-MCNC: 1.25 G/DL (ref 0.67–1.58)
INTERPRETATION SERPL IFE-IMP: NORMAL
KAPPA LC SER QL IA: 2.99 MG/DL (ref 0.33–1.94)
KAPPA LC/LAMBDA SER IA: 1.66 (ref 0.26–1.65)
LAMBDA LC SER QL IA: 1.8 MG/DL (ref 0.57–2.63)
PROT SERPL-MCNC: 7.2 G/DL (ref 6–8.4)

## 2022-07-18 LAB
PATHOLOGIST INTERPRETATION IFE: NORMAL
PATHOLOGIST INTERPRETATION SPE: NORMAL

## 2022-07-19 ENCOUNTER — CLINICAL SUPPORT (OUTPATIENT)
Dept: REHABILITATION | Facility: HOSPITAL | Age: 27
End: 2022-07-19
Payer: MEDICAID

## 2022-07-19 DIAGNOSIS — M25.612 DECREASED RANGE OF MOTION OF LEFT SHOULDER: Primary | ICD-10-CM

## 2022-07-19 PROCEDURE — 97110 THERAPEUTIC EXERCISES: CPT | Mod: CQ

## 2022-07-19 NOTE — PROGRESS NOTES
[facility]   Physical Therapist Assistant Treatment Note     Name: Miriam Jett  Clinic Number: [id number]    Therapy Diagnosis:   Encounter Diagnosis   Name Primary?    Decreased range of motion of left shoulder Yes     Physician: Michael Ramirez    Visit Date: 7/19/2022   Physician Orders: PT Eval and Treat   Medical Diagnosis from Referral: Avascular necrosis of bone of shoulder [M87.019], Chronic left shoulder pain [M25.512, G89.29]  Evaluation Date: 6/23/2022  Authorization Period Expiration: 6/1/23  Plan of Care Expiration: 9/16/22  Visit # / Visits authorized: 4/ 24 + eval    FOTO: 1/3    Precautions: Standard, reverse TSA protocol  DOS: 6/16/22    PTA Visit #: 1/5    Time In: 8:30  Time Out: 9:09   Total Billable Time: 39 minutes    SUBJECTIVE     Pt reports: no issues; feeling good  She was compliant with home exercise program.  Response to previous treatment: no issues  Functional change: improved range of motion     Pain: 0/10 no pain meds  Location: --    OBJECTIVE     Objective Measures updated at progress report unless specified.     Treatment     Miriam received the treatments listed below:      therapeutic exercises to develop strength, endurance, ROM, flexibility and posture for 24 minutes including:  Codman's   Supine scap depression  Supine dowel press up into flexion as ladonna limited to 130 degrees  Supine dowel ext rotation as ladonna limited to 25 degrees  Standing dowel extension  Deltoid isometrics  Standing Scap retractions   Standing shoulder shrugs and circles  Seated thoracic extension over small ball    manual therapy techniques:  for 15 minutes, including:  STM along incision and lats, subscap  PROM shoulder flexion and ext rotation as per protocol      Patient Education and Home Exercises     Home Exercises Provided and Patient Education Provided     Education provided:   - HEP review    Written Home Exercises Provided: Patient instructed to  cont prior HEP. Exercises were reviewed and Miriam was able to demonstrate them prior to the end of the session.  Miriam demonstrated good  understanding of the education provided. See EMR under Patient Instructions for exercises provided during therapy sessions    ASSESSMENT     Miriam presented to therapy today with her soft sling donned and no complaints of pain. Scar mobilization w/tension most noted distally. Minimal tissue tension noted also in lats and subscap; significant improvement in the upper traps. Miriam's range of motion steadily improving ; discomfort at her available end range . Miriam demonstrated a positive response to today's session without adverse symptoms/pain.    Miriam Is progressing well towards her goals.   Pt prognosis is Good.     Pt will continue to benefit from skilled outpatient physical therapy to address the deficits listed in the problem list box on initial evaluation, provide pt/family education and to maximize pt's level of independence in the home and community environment.     Pt's spiritual, cultural and educational needs considered and pt agreeable to plan of care and goals.     Anticipated barriers to physical therapy: none    Goals:   Short Term Goals: 6 weeks   1. Patient will be IND with HEP.     Long Term Goals: 12 weeks   1. Patient will improve left shoulder flexion ROM to 150 degrees.  2. Pt will improve left shoulder ER ROM to 60 degrees.  3. Pt will improve LUE MMT to 4+/5.  4. Pt demo improvements in FOTO score to 42% limited or less.        PLAN     Plan of care Certification: 6/23/2022 to 9/16/22.     Outpatient Physical Therapy 2 times weekly for 12 weeks to include the following interventions: Electrical Stimulation TENS, Manual Therapy, Moist Heat/ Ice, Neuromuscular Re-ed, Patient Education, Therapeutic Activities and Therapeutic Exercise, ASTYM, Kinesiotaping PRN, Functional Dry Needling    Fern Lala, PTA

## 2022-07-25 ENCOUNTER — PATIENT MESSAGE (OUTPATIENT)
Dept: HEMATOLOGY/ONCOLOGY | Facility: CLINIC | Age: 27
End: 2022-07-25
Payer: MEDICAID

## 2022-07-25 ENCOUNTER — TELEPHONE (OUTPATIENT)
Dept: HEMATOLOGY/ONCOLOGY | Facility: CLINIC | Age: 27
End: 2022-07-25
Payer: MEDICAID

## 2022-07-25 ENCOUNTER — PATIENT MESSAGE (OUTPATIENT)
Dept: ORTHOPEDICS | Facility: CLINIC | Age: 27
End: 2022-07-25
Payer: MEDICAID

## 2022-07-25 DIAGNOSIS — E61.1 IRON DEFICIENCY: ICD-10-CM

## 2022-07-25 LAB
COMMENT: NORMAL
FINAL PATHOLOGIC DIAGNOSIS: NORMAL
GROSS: NORMAL
Lab: NORMAL
MICROSCOPIC EXAM: NORMAL
SUPPLEMENTAL DIAGNOSIS: NORMAL

## 2022-07-25 RX ORDER — HEPARIN 100 UNIT/ML
500 SYRINGE INTRAVENOUS
OUTPATIENT
Start: 2022-07-25

## 2022-07-25 RX ORDER — SODIUM CHLORIDE 0.9 % (FLUSH) 0.9 %
10 SYRINGE (ML) INJECTION
OUTPATIENT
Start: 2022-08-23

## 2022-07-25 RX ORDER — HEPARIN 100 UNIT/ML
500 SYRINGE INTRAVENOUS
OUTPATIENT
Start: 2022-08-30

## 2022-07-25 RX ORDER — SODIUM CHLORIDE 0.9 % (FLUSH) 0.9 %
10 SYRINGE (ML) INJECTION
OUTPATIENT
Start: 2022-08-30

## 2022-07-25 RX ORDER — HEPARIN 100 UNIT/ML
500 SYRINGE INTRAVENOUS
OUTPATIENT
Start: 2022-08-19

## 2022-07-25 RX ORDER — SODIUM CHLORIDE 0.9 % (FLUSH) 0.9 %
10 SYRINGE (ML) INJECTION
OUTPATIENT
Start: 2022-08-19

## 2022-07-25 RX ORDER — HEPARIN 100 UNIT/ML
500 SYRINGE INTRAVENOUS
OUTPATIENT
Start: 2022-08-23

## 2022-07-25 RX ORDER — HEPARIN 100 UNIT/ML
500 SYRINGE INTRAVENOUS
OUTPATIENT
Start: 2022-08-26

## 2022-07-25 RX ORDER — SODIUM CHLORIDE 0.9 % (FLUSH) 0.9 %
10 SYRINGE (ML) INJECTION
OUTPATIENT
Start: 2022-07-25

## 2022-07-25 RX ORDER — SODIUM CHLORIDE 0.9 % (FLUSH) 0.9 %
10 SYRINGE (ML) INJECTION
OUTPATIENT
Start: 2022-08-26

## 2022-07-25 NOTE — TELEPHONE ENCOUNTER
----- Message from Tia Thornton NP sent at 7/25/2022  3:09 PM CDT -----  Please let patient know that her bone marrow biopsy looked good except for iron deficiency.  Would like to give her venofer 200 mg IV x5.  Will you please arrange?    Tia Blackmon'

## 2022-07-27 RX ORDER — HYDROCODONE BITARTRATE AND ACETAMINOPHEN 5; 325 MG/1; MG/1
1 TABLET ORAL EVERY 6 HOURS PRN
Qty: 24 TABLET | Refills: 0 | Status: SHIPPED | OUTPATIENT
Start: 2022-07-27 | End: 2023-02-09

## 2022-07-29 ENCOUNTER — TELEPHONE (OUTPATIENT)
Dept: HEMATOLOGY/ONCOLOGY | Facility: CLINIC | Age: 27
End: 2022-07-29
Payer: MEDICAID

## 2022-07-29 ENCOUNTER — PATIENT MESSAGE (OUTPATIENT)
Dept: HEMATOLOGY/ONCOLOGY | Facility: CLINIC | Age: 27
End: 2022-07-29
Payer: MEDICAID

## 2022-07-29 NOTE — TELEPHONE ENCOUNTER
"Good morning Tia I called Miriam Jett with her bone marrow results and your recommendation of Venofer x5 and she said "and they put me on Iron pills you must have the wrong message they put me on pills I'm taking pills . So I told Miriam that I would recheck with Tia to make sure of her reccomedation of Venofer x5. Please advise  Thank you.  "

## 2022-07-29 NOTE — TELEPHONE ENCOUNTER
Yes, I did put her on iron pills; however the bone marrow biopsy showed very low iron levels.  She can continue pills if she likes as long as she is not having any constipation or GI issues.  It just takes a little longer to bring iron stores up with oral iron vs IV iron.

## 2022-07-29 NOTE — TELEPHONE ENCOUNTER
Please schedule patient for IV venofer 200 mg IV on D1 and 4 for 5 doses and let her know her schedule.  Thank you

## 2022-08-02 ENCOUNTER — OFFICE VISIT (OUTPATIENT)
Dept: ORTHOPEDICS | Facility: CLINIC | Age: 27
End: 2022-08-02
Payer: MEDICAID

## 2022-08-02 VITALS — BODY MASS INDEX: 27.47 KG/M2 | HEIGHT: 67 IN | WEIGHT: 175 LBS

## 2022-08-02 DIAGNOSIS — Z96.612 S/P SHOULDER HEMIARTHROPLASTY, LEFT: Primary | ICD-10-CM

## 2022-08-02 PROCEDURE — 99999 PR PBB SHADOW E&M-EST. PATIENT-LVL III: ICD-10-PCS | Mod: PBBFAC,,, | Performed by: STUDENT IN AN ORGANIZED HEALTH CARE EDUCATION/TRAINING PROGRAM

## 2022-08-02 PROCEDURE — 3008F PR BODY MASS INDEX (BMI) DOCUMENTED: ICD-10-PCS | Mod: CPTII,,, | Performed by: STUDENT IN AN ORGANIZED HEALTH CARE EDUCATION/TRAINING PROGRAM

## 2022-08-02 PROCEDURE — 99999 PR PBB SHADOW E&M-EST. PATIENT-LVL III: CPT | Mod: PBBFAC,,, | Performed by: STUDENT IN AN ORGANIZED HEALTH CARE EDUCATION/TRAINING PROGRAM

## 2022-08-02 PROCEDURE — 1159F PR MEDICATION LIST DOCUMENTED IN MEDICAL RECORD: ICD-10-PCS | Mod: CPTII,,, | Performed by: STUDENT IN AN ORGANIZED HEALTH CARE EDUCATION/TRAINING PROGRAM

## 2022-08-02 PROCEDURE — 1160F PR REVIEW ALL MEDS BY PRESCRIBER/CLIN PHARMACIST DOCUMENTED: ICD-10-PCS | Mod: CPTII,,, | Performed by: STUDENT IN AN ORGANIZED HEALTH CARE EDUCATION/TRAINING PROGRAM

## 2022-08-02 PROCEDURE — 3008F BODY MASS INDEX DOCD: CPT | Mod: CPTII,,, | Performed by: STUDENT IN AN ORGANIZED HEALTH CARE EDUCATION/TRAINING PROGRAM

## 2022-08-02 PROCEDURE — 99213 OFFICE O/P EST LOW 20 MIN: CPT | Mod: PBBFAC | Performed by: STUDENT IN AN ORGANIZED HEALTH CARE EDUCATION/TRAINING PROGRAM

## 2022-08-02 PROCEDURE — 1160F RVW MEDS BY RX/DR IN RCRD: CPT | Mod: CPTII,,, | Performed by: STUDENT IN AN ORGANIZED HEALTH CARE EDUCATION/TRAINING PROGRAM

## 2022-08-02 PROCEDURE — 99024 PR POST-OP FOLLOW-UP VISIT: ICD-10-PCS | Mod: ,,, | Performed by: STUDENT IN AN ORGANIZED HEALTH CARE EDUCATION/TRAINING PROGRAM

## 2022-08-02 PROCEDURE — 99024 POSTOP FOLLOW-UP VISIT: CPT | Mod: ,,, | Performed by: STUDENT IN AN ORGANIZED HEALTH CARE EDUCATION/TRAINING PROGRAM

## 2022-08-02 PROCEDURE — 1159F MED LIST DOCD IN RCRD: CPT | Mod: CPTII,,, | Performed by: STUDENT IN AN ORGANIZED HEALTH CARE EDUCATION/TRAINING PROGRAM

## 2022-08-02 NOTE — PROGRESS NOTES
Orthopaedics  Post-operative follow-up    Procedure Performed:  1.  Left humerus steven-arthroplasty      Date of Surgery: 6/16/22    Subjective: Miriam Jett is now approximately 6 weeks out from her shoulder surgery.  She has been compliant with post-operative restrictions and has been progressing with PT.  Her pain and function continue to improve.    Exam:  Incision sites healed, C/D/I  No swelling or bruising noted  Fluid ROM with no crepitus  Active Supine ROM: , ABD 60,  Passive Supine ROM: , ABD 90, ER 50  Cuff strength intact on limited testing  Axillary nerve sensation and motor intact  Motor and sensory intact distally  Strong radial pulse, fingers warm and well perfused    Imaging:  X-Ray Shoulder 2 or More Views Left  Narrative: EXAMINATION:  XR SHOULDER COMPLETE 2 OR MORE VIEWS LEFT    CLINICAL HISTORY:  Pain in left shoulder    TECHNIQUE:  Two or three views of the left shoulder were performed.    COMPARISON:  Left shoulder radiographs May 3, 2022    FINDINGS:  Left humeral head arthroplasty appears unchanged without evidence of periprosthetic fracture or osteolysis.  Shoulder alignment is within normal limits.  Left acromioclavicular joint appears normal.  There is been interval resolution of the postoperative soft tissue gas previously seen about the left shoulder.  Visualized lung is clear.  Impression: As above.    Electronically signed by: Benson Santos  Date:    07/01/2022  Time:    11:21      Impression:  S/P left shoulder steven-arthoplasty, second post-operative visit - doing well    Plan:  She is making good progress in PT. Her ROM is steadily improving and she has little to no pain.  Advance PT per protocol.   Symptomatic treatment for pain / swelling  May advance activities as reviewed today: may begin to transition out of the sling over the next week, focus on progressing active range of motion.   Instructed patient to call clinic if questions or  concerns    Follow-up in 6 weeks with XR at 3 months post-op (around 9/16/22).     Work status:  For weeks 0-6 from now:  1) Discontinue sling  2) Continue physical therapy  3) No lifting/pushing/pulling greater than 2 pounds  4) No overhead work  5) No repetitive motions        Michael Ramirez MD    I, Flakito Sommer, acted as a scribe for Michael Ramirez MD for the duration of this office visit.

## 2022-08-15 ENCOUNTER — TELEPHONE (OUTPATIENT)
Dept: INFUSION THERAPY | Facility: HOSPITAL | Age: 27
End: 2022-08-15
Payer: MEDICAID

## 2022-09-14 ENCOUNTER — HOSPITAL ENCOUNTER (OUTPATIENT)
Dept: RADIOLOGY | Facility: HOSPITAL | Age: 27
Discharge: HOME | End: 2022-09-14
Attending: STUDENT IN AN ORGANIZED HEALTH CARE EDUCATION/TRAINING PROGRAM
Payer: MEDICAID

## 2022-09-14 ENCOUNTER — OFFICE VISIT (OUTPATIENT)
Dept: ORTHOPEDICS | Facility: CLINIC | Age: 27
End: 2022-09-14
Payer: MEDICAID

## 2022-09-14 VITALS — WEIGHT: 175 LBS | BODY MASS INDEX: 27.47 KG/M2 | HEIGHT: 67 IN

## 2022-09-14 DIAGNOSIS — Z96.612 S/P SHOULDER HEMIARTHROPLASTY, LEFT: ICD-10-CM

## 2022-09-14 DIAGNOSIS — Z96.612 S/P SHOULDER HEMIARTHROPLASTY, LEFT: Primary | ICD-10-CM

## 2022-09-14 PROCEDURE — 73030 X-RAY EXAM OF SHOULDER: CPT | Mod: 26,LT,, | Performed by: RADIOLOGY

## 2022-09-14 PROCEDURE — 3008F PR BODY MASS INDEX (BMI) DOCUMENTED: ICD-10-PCS | Mod: CPTII,,, | Performed by: STUDENT IN AN ORGANIZED HEALTH CARE EDUCATION/TRAINING PROGRAM

## 2022-09-14 PROCEDURE — 99213 OFFICE O/P EST LOW 20 MIN: CPT | Mod: PBBFAC | Performed by: STUDENT IN AN ORGANIZED HEALTH CARE EDUCATION/TRAINING PROGRAM

## 2022-09-14 PROCEDURE — 99024 POSTOP FOLLOW-UP VISIT: CPT | Mod: ,,, | Performed by: STUDENT IN AN ORGANIZED HEALTH CARE EDUCATION/TRAINING PROGRAM

## 2022-09-14 PROCEDURE — 73030 X-RAY EXAM OF SHOULDER: CPT | Mod: TC,LT

## 2022-09-14 PROCEDURE — 1160F PR REVIEW ALL MEDS BY PRESCRIBER/CLIN PHARMACIST DOCUMENTED: ICD-10-PCS | Mod: CPTII,,, | Performed by: STUDENT IN AN ORGANIZED HEALTH CARE EDUCATION/TRAINING PROGRAM

## 2022-09-14 PROCEDURE — 99999 PR PBB SHADOW E&M-EST. PATIENT-LVL III: CPT | Mod: PBBFAC,,, | Performed by: STUDENT IN AN ORGANIZED HEALTH CARE EDUCATION/TRAINING PROGRAM

## 2022-09-14 PROCEDURE — 1160F RVW MEDS BY RX/DR IN RCRD: CPT | Mod: CPTII,,, | Performed by: STUDENT IN AN ORGANIZED HEALTH CARE EDUCATION/TRAINING PROGRAM

## 2022-09-14 PROCEDURE — 1159F MED LIST DOCD IN RCRD: CPT | Mod: CPTII,,, | Performed by: STUDENT IN AN ORGANIZED HEALTH CARE EDUCATION/TRAINING PROGRAM

## 2022-09-14 PROCEDURE — 99999 PR PBB SHADOW E&M-EST. PATIENT-LVL III: ICD-10-PCS | Mod: PBBFAC,,, | Performed by: STUDENT IN AN ORGANIZED HEALTH CARE EDUCATION/TRAINING PROGRAM

## 2022-09-14 PROCEDURE — 73030 XR SHOULDER COMPLETE 2 OR MORE VIEWS LEFT: ICD-10-PCS | Mod: 26,LT,, | Performed by: RADIOLOGY

## 2022-09-14 PROCEDURE — 3008F BODY MASS INDEX DOCD: CPT | Mod: CPTII,,, | Performed by: STUDENT IN AN ORGANIZED HEALTH CARE EDUCATION/TRAINING PROGRAM

## 2022-09-14 PROCEDURE — 99024 PR POST-OP FOLLOW-UP VISIT: ICD-10-PCS | Mod: ,,, | Performed by: STUDENT IN AN ORGANIZED HEALTH CARE EDUCATION/TRAINING PROGRAM

## 2022-09-14 PROCEDURE — 1159F PR MEDICATION LIST DOCUMENTED IN MEDICAL RECORD: ICD-10-PCS | Mod: CPTII,,, | Performed by: STUDENT IN AN ORGANIZED HEALTH CARE EDUCATION/TRAINING PROGRAM

## 2022-09-14 RX ORDER — NAPROXEN 500 MG/1
500 TABLET ORAL 2 TIMES DAILY
Qty: 60 TABLET | Refills: 1 | Status: SHIPPED | OUTPATIENT
Start: 2022-09-14 | End: 2022-11-16 | Stop reason: SDUPTHER

## 2022-09-14 RX ORDER — HYDROCODONE BITARTRATE AND ACETAMINOPHEN 5; 325 MG/1; MG/1
1 TABLET ORAL EVERY 6 HOURS PRN
Qty: 20 TABLET | Refills: 0 | Status: SHIPPED | OUTPATIENT
Start: 2022-09-14 | End: 2023-02-09

## 2022-09-14 NOTE — PROGRESS NOTES
Orthopaedics  Post-operative follow-up    Procedure Performed:   Left humerus steven-arthroplasty      Date of Surgery: 6/16/22    Subjective: Miriam Jett is now approximately 3 months out from her shoulder surgery.  She has been compliant with post-operative restrictions and has been progressing with PT.  Her pain and function continue to improve. She is making slow but steady progress in terms of range of motion. She has much less pain that before surgery but continues to have some tightness with ROM    Exam:  Incision sites healed, C/D/I  No swelling or bruising noted  Fluid ROM with no crepitus  Active Supine ROM: ,   Active upright ROM: FF 90, ABD 70  Cuff strength: IR 5/5, ER 5/5  Axillary nerve sensation and motor intact  Motor and sensory intact distally  Strong radial pulse, fingers warm and well perfused    Imaging:    X-Ray Shoulder 2 or More Views Left  Narrative: EXAMINATION:  XR SHOULDER COMPLETE 2 OR MORE VIEWS LEFT    CLINICAL HISTORY:  Presence of left artificial shoulder joint    TECHNIQUE:  Two or three views of the left shoulder were performed.    COMPARISON:  07/01/2022.    FINDINGS:  Prior humeral head arthroplasty in normal alignment.  No evidence to suggest hardware failure.  Glenoid rim appears intact.    AC joint intact.  No overlying soft tissue swelling.  Impression: Prior humeral head arthroplasty in alignment.    Electronically signed by: Jorge Lmi  Date:    09/14/2022  Time:    12:57           Impression:  S/P left shoulder steven-arthoplasty, third post-operative visit - routine healing    Plan:  She is making slow but steady progress in PT. Her ROM is steadily improving and she has little to no pain.  Advance PT per protocol - focus on ROM and strengthening  Symptomatic treatment for pain / swelling  May advance activities as reviewed today: continue to work on ROM, I emphasized working daily at home and showed additional exercises she can perform on her  own  Instructed patient to call clinic if questions or concerns    Prescription for Norco provided today.     Follow-up in 2 months for motion check             Michael Ramirez MD    I, Flakito Sommer, acted as a scribe for Michael Ramirez MD for the duration of this office visit.

## 2022-10-10 ENCOUNTER — PATIENT MESSAGE (OUTPATIENT)
Dept: ORTHOPEDICS | Facility: CLINIC | Age: 27
End: 2022-10-10
Payer: MEDICAID

## 2022-10-11 ENCOUNTER — PATIENT MESSAGE (OUTPATIENT)
Dept: ORTHOPEDICS | Facility: CLINIC | Age: 27
End: 2022-10-11
Payer: MEDICAID

## 2022-10-11 DIAGNOSIS — Z96.612 S/P SHOULDER HEMIARTHROPLASTY, LEFT: Primary | ICD-10-CM

## 2022-10-11 RX ORDER — HYDROCODONE BITARTRATE AND ACETAMINOPHEN 5; 325 MG/1; MG/1
1 TABLET ORAL EVERY 8 HOURS PRN
Qty: 15 TABLET | Refills: 0 | Status: SHIPPED | OUTPATIENT
Start: 2022-10-11 | End: 2023-02-09

## 2022-10-24 ENCOUNTER — CLINICAL SUPPORT (OUTPATIENT)
Dept: REHABILITATION | Facility: HOSPITAL | Age: 27
End: 2022-10-24
Payer: MEDICAID

## 2022-10-24 DIAGNOSIS — M25.612 DECREASED RANGE OF MOTION OF LEFT SHOULDER: Primary | ICD-10-CM

## 2022-10-24 PROCEDURE — 97110 THERAPEUTIC EXERCISES: CPT

## 2022-10-24 NOTE — PROGRESS NOTES
[facility]   Physical Therapist Treatment Note     Name: Miriam Jett  Clinic Number: [id number]    Therapy Diagnosis:   No diagnosis found.    Physician: Michael Ramirez*    Visit Date: 10/24/2022   Physician Orders: PT Eval and Treat   Medical Diagnosis from Referral: Avascular necrosis of bone of shoulder [M87.019], Chronic left shoulder pain [M25.512, G89.29]  Evaluation Date: 6/23/2022  Authorization Period Expiration: 6/1/23  Plan of Care Expiration: 1/16/22  Visit # / Visits authorized: 5/ 24 + eval    FOTO: 1/3    Precautions: Standard, reverse TSA protocol  DOS: 6/16/22    PTA Visit #: 1/5    Time In: 900  Time Out: 9:42   Total Billable Time: 42 minutes    SUBJECTIVE     Pt reports: no issues; feeling good  She was compliant with home exercise program.  Response to previous treatment: no issues  Functional change: improved range of motion     Pain: 0/10 no pain meds  Location: --    OBJECTIVE     Objective Measures updated at progress report unless specified.     CMS Impairment/Limitation/Restriction for FOTO Shoulder Survey     Therapist reviewed FOTO scores for Miriam Jett on 6/23/2022.   FOTO documents entered into [employer] - see Media section.     Limitation Score: 96% Initial Eval  Limitation Score:   Category: Mobility      Range of Motion/Strength: Initial Eval     Shoulder Right Left Pain/Dysfunction with Movement   AROM         flexion 160 NT     abduction 160 NT     ER at 0° abd 60 NT           U/E MMT Right Left Pain/Dysfunction with Movement   Shoulder Flexion 5/5 NT     Shoulder Abduction 5/5 NT     Shoulder IR 5/5 NT     Shoulder ER  @ 0* Abduction 5/5 NT     Elbow Flexion  5/5 3+/5     Elbow Extension 5/5 3+/5        Range of Motion/Strength: 10/24/2022     Shoulder Right Left Pain/Dysfunction with Movement   AROM         flexion 160 105*     abduction 160 85*     ER at 0° abd 60 C7     IR T10 L4          U/E MMT Right Left  Pain/Dysfunction with Movement   Shoulder Flexion 5/5 4/5     Shoulder Abduction 5/5 4/5     Shoulder IR 5/5 4/5     Shoulder ER  @ 0* Abduction 5/5 4/5     Elbow Flexion  5/5 4+/5     Elbow Extension 5/5 4+/5        Treatment     Miriam received the treatments listed below:      therapeutic exercises to develop strength, endurance, ROM, flexibility and posture for 45 minutes including:    +Supine AAROM Wand ER x20  +Sidelying Abduction x20  +Sidelying Flexion x20  +Sidelying ER x20  +Standing AAROM Wand IR Toeczz56  +Standing AAROM Wand IR Pulls x20  +Standing AAROM Towel IR x20    manual therapy techniques:  for 00 minutes, including:      Patient Education and Home Exercises     Home Exercises Provided and Patient Education Provided     Education provided:   - HEP review    Written Home Exercises Provided: Patient instructed to cont prior HEP. Exercises were reviewed and Miriam was able to demonstrate them prior to the end of the session.  Miriam demonstrated good  understanding of the education provided. See EMR under Patient Instructions for exercises provided during therapy sessions    ASSESSMENT     Miriam presented to therapy today after ~6 weeks off from physical therapy after visiting MD.  Notes reduced pain in the L Shoulder and improved ROM but continued difficulties with elevation to end range and reaching behind her back.  Discussed importance of physical therapy adherence for full return of recreational and occupational activities.  Given updated HEP for continued maintenance and progression at home.  Pt tolerated well and noted fatigue upon conclusion of interventions.  Will continue to progress per tissue tolerance.    Miriam Is progressing well towards her goals.     Pt prognosis is Good.     Pt will continue to benefit from skilled outpatient physical therapy to address the deficits listed in the problem list box on initial evaluation, provide pt/family education and to maximize pt's  level of independence in the home and community environment.     Pt's spiritual, cultural and educational needs considered and pt agreeable to plan of care and goals.     Anticipated barriers to physical therapy: none    Goals:   Short Term Goals: 6 weeks   1. Patient will be IND with HEP. PROGRESSING     Long Term Goals: 12 weeks   1. Patient will improve left shoulder flexion ROM to 150 degrees. PROGRESSING  2. Pt will improve left shoulder ER ROM to 60 degrees. PROGRESSING  3. Pt will improve LUE MMT to 4+/5. PROGRESSING  4. Pt demo improvements in FOTO score to 42% limited or less. PROGRESSING     PLAN     Plan of care Certification: 10/24/2022 to 01/16/2022.     Outpatient Physical Therapy 2 times weekly for 12 weeks to include the following interventions: Electrical Stimulation TENS, Manual Therapy, Moist Heat/ Ice, Neuromuscular Re-ed, Patient Education, Therapeutic Activities and Therapeutic Exercise, ASTYM, Kinesiotaping PRN, Functional Dry Needling    Ammon Milton, SPT

## 2022-10-25 ENCOUNTER — TELEPHONE (OUTPATIENT)
Dept: ORTHOPEDICS | Facility: CLINIC | Age: 27
End: 2022-10-25
Payer: MEDICAID

## 2022-11-04 ENCOUNTER — CLINICAL SUPPORT (OUTPATIENT)
Dept: REHABILITATION | Facility: HOSPITAL | Age: 27
End: 2022-11-04
Payer: MEDICAID

## 2022-11-04 DIAGNOSIS — M25.612 DECREASED RANGE OF MOTION OF LEFT SHOULDER: Primary | ICD-10-CM

## 2022-11-04 PROCEDURE — 97110 THERAPEUTIC EXERCISES: CPT | Mod: CQ

## 2022-11-04 PROCEDURE — 97140 MANUAL THERAPY 1/> REGIONS: CPT | Mod: CQ

## 2022-11-04 NOTE — PROGRESS NOTES
[facility]   Physical Therapist Assistant Treatment Note     Name: Miriam Jett  Clinic Number: [id number]    Therapy Diagnosis:   Encounter Diagnosis   Name Primary?    Decreased range of motion of left shoulder Yes       Physician: Michael Ramirez    Visit Date: 11/4/2022   Physician Orders: PT Eval and Treat   Medical Diagnosis from Referral: Avascular necrosis of bone of shoulder [M87.019], Chronic left shoulder pain [M25.512, G89.29]  Evaluation Date: 6/23/2022  Authorization Period Expiration: 6/1/23  Plan of Care Expiration: 1/16/22  Visit # / Visits authorized: 6/ 24 + eval    FOTO: 1/3    Precautions: Standard, reverse TSA protocol  DOS: 6/16/22    PTA Visit #: 1/5    Time In: 2:55  Time Out: 3:40   Total Billable Time: 45 minutes    SUBJECTIVE     Pt reports: no complaints of pain today.  She was compliant with home exercise program.  Response to previous treatment: no issues  Functional change: improved range of motion     Pain: 0/10 no pain meds  Location: --    OBJECTIVE     Objective Measures updated at progress report unless specified.      Treatment     Miriam received the treatments listed below:      therapeutic exercises to develop strength, endurance, ROM, flexibility and posture for 30 minutes including:    +Supine AAROM Wand ER x20  +Supine AAROM Wand Abd x10  +Supine AAROM Wand ER 2x10  +Supine protraction x15  +Sidelying Abduction x20  +Sidelying Flexion x20  +Sidelying ER x20  +Standing AAROM Wand IR Lifts x20  +Standing AAROM Wand IR Pulls x20  +Standing AAROM Towel IR x20    manual therapy techniques:  for 15 minutes, including:  STM along incision and lats, subscap  PROM shoulder flexion and ext rotation as per protocol      Patient Education and Home Exercises     Home Exercises Provided and Patient Education Provided     Education provided:   - HEP review    Written Home Exercises Provided: Patient instructed to cont prior HEP. Exercises  were reviewed and Miriam was able to demonstrate them prior to the end of the session.  Miriam demonstrated good  understanding of the education provided. See EMR under Patient Instructions for exercises provided during therapy sessions    ASSESSMENT     Miriam presented to therapy with no significant complaints of pain in left shoulder but still limited with overhead range of motion. She has increased myofascial tension in anterior shoulder and deltoids addressed with manual therapy. Patient continued strengthening and range of motion exercises with notable fatigue in side lying.     Miriam Is progressing well towards her goals.     Pt prognosis is Good.     Pt will continue to benefit from skilled outpatient physical therapy to address the deficits listed in the problem list box on initial evaluation, provide pt/family education and to maximize pt's level of independence in the home and community environment.     Pt's spiritual, cultural and educational needs considered and pt agreeable to plan of care and goals.     Anticipated barriers to physical therapy: none    Goals:   Short Term Goals: 6 weeks   1. Patient will be IND with HEP. PROGRESSING     Long Term Goals: 12 weeks   1. Patient will improve left shoulder flexion ROM to 150 degrees. PROGRESSING  2. Pt will improve left shoulder ER ROM to 60 degrees. PROGRESSING  3. Pt will improve LUE MMT to 4+/5. PROGRESSING  4. Pt demo improvements in FOTO score to 42% limited or less. PROGRESSING     PLAN     Plan of care Certification: 10/24/2022 to 01/16/2022.     Outpatient Physical Therapy 2 times weekly for 12 weeks to include the following interventions: Electrical Stimulation TENS, Manual Therapy, Moist Heat/ Ice, Neuromuscular Re-ed, Patient Education, Therapeutic Activities and Therapeutic Exercise, ASTYM, Kinesiotaping PRN, Functional Dry Needling    Mariella Wray, PTA

## 2022-11-07 ENCOUNTER — PATIENT MESSAGE (OUTPATIENT)
Dept: PAIN MEDICINE | Facility: CLINIC | Age: 27
End: 2022-11-07
Payer: MEDICAID

## 2022-11-09 ENCOUNTER — PATIENT MESSAGE (OUTPATIENT)
Dept: ORTHOPEDICS | Facility: CLINIC | Age: 27
End: 2022-11-09
Payer: MEDICAID

## 2022-11-09 ENCOUNTER — TELEPHONE (OUTPATIENT)
Dept: ORTHOPEDICS | Facility: CLINIC | Age: 27
End: 2022-11-09
Payer: MEDICAID

## 2022-11-09 ENCOUNTER — OFFICE VISIT (OUTPATIENT)
Dept: PAIN MEDICINE | Facility: CLINIC | Age: 27
End: 2022-11-09
Payer: MEDICAID

## 2022-11-09 VITALS
SYSTOLIC BLOOD PRESSURE: 140 MMHG | RESPIRATION RATE: 17 BRPM | DIASTOLIC BLOOD PRESSURE: 92 MMHG | HEIGHT: 67 IN | HEART RATE: 92 BPM | BODY MASS INDEX: 32.44 KG/M2 | WEIGHT: 206.69 LBS

## 2022-11-09 DIAGNOSIS — Z96.612 S/P SHOULDER HEMIARTHROPLASTY, LEFT: Primary | ICD-10-CM

## 2022-11-09 DIAGNOSIS — M87.019 AVASCULAR NECROSIS OF BONE OF SHOULDER: ICD-10-CM

## 2022-11-09 DIAGNOSIS — M79.18 MYOFASCIAL PAIN: Primary | ICD-10-CM

## 2022-11-09 DIAGNOSIS — M25.559 HIP PAIN: Primary | ICD-10-CM

## 2022-11-09 PROCEDURE — 1159F MED LIST DOCD IN RCRD: CPT | Mod: CPTII,,, | Performed by: PHYSICIAN ASSISTANT

## 2022-11-09 PROCEDURE — 99999 PR PBB SHADOW E&M-EST. PATIENT-LVL IV: ICD-10-PCS | Mod: PBBFAC,,, | Performed by: PHYSICIAN ASSISTANT

## 2022-11-09 PROCEDURE — 3008F PR BODY MASS INDEX (BMI) DOCUMENTED: ICD-10-PCS | Mod: CPTII,,, | Performed by: PHYSICIAN ASSISTANT

## 2022-11-09 PROCEDURE — 3077F PR MOST RECENT SYSTOLIC BLOOD PRESSURE >= 140 MM HG: ICD-10-PCS | Mod: CPTII,,, | Performed by: PHYSICIAN ASSISTANT

## 2022-11-09 PROCEDURE — 99214 PR OFFICE/OUTPT VISIT, EST, LEVL IV, 30-39 MIN: ICD-10-PCS | Mod: S$PBB,,, | Performed by: PHYSICIAN ASSISTANT

## 2022-11-09 PROCEDURE — 3080F PR MOST RECENT DIASTOLIC BLOOD PRESSURE >= 90 MM HG: ICD-10-PCS | Mod: CPTII,,, | Performed by: PHYSICIAN ASSISTANT

## 2022-11-09 PROCEDURE — 1160F RVW MEDS BY RX/DR IN RCRD: CPT | Mod: CPTII,,, | Performed by: PHYSICIAN ASSISTANT

## 2022-11-09 PROCEDURE — 99214 OFFICE O/P EST MOD 30 MIN: CPT | Mod: S$PBB,,, | Performed by: PHYSICIAN ASSISTANT

## 2022-11-09 PROCEDURE — 1159F PR MEDICATION LIST DOCUMENTED IN MEDICAL RECORD: ICD-10-PCS | Mod: CPTII,,, | Performed by: PHYSICIAN ASSISTANT

## 2022-11-09 PROCEDURE — 3080F DIAST BP >= 90 MM HG: CPT | Mod: CPTII,,, | Performed by: PHYSICIAN ASSISTANT

## 2022-11-09 PROCEDURE — 3077F SYST BP >= 140 MM HG: CPT | Mod: CPTII,,, | Performed by: PHYSICIAN ASSISTANT

## 2022-11-09 PROCEDURE — 99999 PR PBB SHADOW E&M-EST. PATIENT-LVL IV: CPT | Mod: PBBFAC,,, | Performed by: PHYSICIAN ASSISTANT

## 2022-11-09 PROCEDURE — 99214 OFFICE O/P EST MOD 30 MIN: CPT | Mod: PBBFAC | Performed by: PHYSICIAN ASSISTANT

## 2022-11-09 PROCEDURE — 3008F BODY MASS INDEX DOCD: CPT | Mod: CPTII,,, | Performed by: PHYSICIAN ASSISTANT

## 2022-11-09 PROCEDURE — 1160F PR REVIEW ALL MEDS BY PRESCRIBER/CLIN PHARMACIST DOCUMENTED: ICD-10-PCS | Mod: CPTII,,, | Performed by: PHYSICIAN ASSISTANT

## 2022-11-09 RX ORDER — METHOCARBAMOL 750 MG/1
750 TABLET, FILM COATED ORAL 3 TIMES DAILY
Qty: 90 TABLET | Refills: 2 | Status: SHIPPED | OUTPATIENT
Start: 2022-11-09 | End: 2022-11-19

## 2022-11-09 NOTE — TELEPHONE ENCOUNTER
Faxed referral to [facility] at [phone] with successful fax confirmation email receipt.

## 2022-11-09 NOTE — PROGRESS NOTES
Chronic Pain-Est patient - Follow up visit      Notes:    Referring Physician: No ref. provider found    PCP: Benjamin Yarbrough MD    Chief Complaint: hip pain    Past Medical History:   Diagnosis Date    Miscarriage      Past Surgical History:   Procedure Laterality Date    BONE GRAFT Left 7/13/2021    Procedure: BONE GRAFT;  Surgeon: Spenser Krishnamurthy MD;  Location: [facility] OR;  Service: Orthopedics;  Laterality: Left;  pro-dense    CORE DECOMPRESSION OF HEAD OF FEMUR Left 7/13/2021    Procedure: CORE DECOMPRESSION, FEMUR, HEAD;  Surgeon: Spenser Krishnamurthy MD;  Location: [facility] OR;  Service: Orthopedics;  Laterality: Left;    HIP ARTHROPLASTY Right 10/6/2021    Procedure: ARTHROPLASTY, HIP;  Surgeon: Spenser Krishnamurthy MD;  Location: [facility] OR;  Service: Orthopedics;  Laterality: Right;    INJECTION OF JOINT Right 7/12/2021    Procedure: right IA Hip Joint injection- per Dr Ruelsa;  Surgeon: Juan David Ho MD;  [facility];  Service: Pain Management;  Laterality: Right;    PARTIAL ARTHROPLASTY OF SHOULDER Left 6/16/2022    Procedure: HEMIARTHROPLASTY, SHOULDER;  Surgeon: Michael Ramirez MD;  Location: [facility] OR;  Service: Orthopedics;  Laterality: Left;     Review of patient's allergies indicates:  No Known Allergies    Current Outpatient Medications   Medication Sig    acetaminophen (TYLENOL) 500 MG tablet Take 2 tablets (1,000 mg total) by mouth every 8 (eight) hours as needed for Pain. (Patient not taking: Reported on 9/14/2022)    aspirin (ECOTRIN) 81 MG EC tablet Take 1 tablet (81 mg total) by mouth 2 (two) times a day. for 14 days    ferrous sulfate (FEOSOL) 325 mg (65 mg iron) Tab tablet Take 1 tablet every by mouth every other day. (Patient not taking: Reported on 9/14/2022)    HYDROcodone-acetaminophen (NORCO) 5-325 mg per tablet Take 1 tablet by mouth every 6 (six) hours as needed for Pain.    HYDROcodone-acetaminophen (NORCO) 5-325 mg per tablet Take 1 tablet by mouth every 6 (six) hours as  needed for Pain.    HYDROcodone-acetaminophen (NORCO) 5-325 mg per tablet Take 1 tablet by mouth every 8 (eight) hours as needed for Pain.    megestroL (MEGACE ES) 625 mg/5 mL (125 mg/mL) Susp daily as needed.     naproxen (NAPROSYN) 500 MG tablet Take 1 tablet (500 mg total) by mouth 2 (two) times daily.    oxyCODONE (ROXICODONE) 5 MG immediate release tablet Take 1 tablet (5 mg total) by mouth every 4 (four) hours as needed for Pain (post-op pain). (Patient not taking: Reported on 9/14/2022)     No current facility-administered medications for this visit.     Facility-Administered Medications Ordered in Other Visits   Medication    iron sucrose (VENOFER) 200 mg in sodium chloride 0.9% 100 mL IVPB        SUBJECTIVE:  Interval History (11/9/2022):  Miriam Jett presents today for follow-up visit.  Patient was last seen on 05/03/2022. At that visit, the plan was to schedule bilateral femoral and obturator nerve blocks, but this was not scheduled as she reports previous hip injections worsened her pain.  Patient reports pain as 3/10 today. She is currently enrolled in physical therapy s/p hemiarthroplasty of her left shoulder with Dr. Ramirez on 06/16/2022. She reports today her worst pain is located in her lower back with radiation up to her mid back, cramping in nature. Her pain is worsened by sitting and laying down at night.      Initial HPI 05/03/2022  Miriam Jett is a 27 y.o. female with past medical  history significant status post right total hip arthroplasty 10/06/2021 and left hip core decompression 07/13/2021 who presents to tele-medicine clinic for the evaluation of bilateral hip pain.  Patient reports pain has been present since her last surgery in October of last year.  Patient reports pain along bilateral incision sites which radiates into the groin in L2 distribution.  Pain is constant and described as aching in nature and numbness along the incision sites.  Pain is  exacerbated with prolonged standing and with ambulation.  Patient is able to stand or ambulate approximately 5 minutes before requiring rest.  Patient does endorse associated sensation of buckling or weakness in the lower extremities associated with her pain.  Pain is marginally improved with Aleve or Tylenol.    Of note patient saw orthopedics 04/29/2022 with diagnosis of idiopathic avascular necrosis of both hips.  Patient received refill and Mobic and Voltaren gel.    Patient reports significant motor weakness and loss of sensations.  Patient denies night fever/night sweats, urinary incontinence, bowel incontinence and significant weight loss.    Non-Pharmacologic Treatments:  Physical Therapy/Home Exercise: yes  Ice/Heat:yes  TENS: no  Acupuncture: no  Massage: no  Chiropractic: no    Other: yes; sx    Pain Medications:  - Opioids: Percocet (Oxycodone/Acetaminophen)  - Adjuvant Medications: Mobic (Meloxicam) and Neurontin (Gabapentin)    Pain Procedures:   None    Imaging:   X-Ray Hips Bilateral 2 View Incl AP Pelvis      Details    Reading Physician Reading Date Result Priority   Saul Rose MD  [phone] 3/24/2022 Routine     Narrative & Impression  EXAMINATION:  XR HIPS BILATERAL 2 VIEW INCL AP PELVIS     CLINICAL HISTORY:  Idiopathic aseptic necrosis of left femur     TECHNIQUE:  AP view of the pelvis and frogleg lateral views of both hips were performed.     COMPARISON:  Prior radiographs     FINDINGS:  Right hip total arthroplasty findings are stable.  No acute osseous abnormality.  Left hip demonstrates similar appearance with postoperative decompression and osteonecrosis changes.  No interval femoral head collapse or volume loss.  No acute abnormality.     Impression:     Similar appearance of the pelvis/hips.        Electronically signed by: Saul Rose MD  Date:                                            03/24/2022  Time:                                           09:21       GENERAL:   "No weight loss, malaise or fevers.  HEENT:   No recent changes in vision or hearing  NECK:  Negative for lumps, no difficulty with swallowing.  RESPIRATORY:  Negative for cough, wheezing or shortness of breath, patient denies any recent URI.  CARDIOVASCULAR:  Negative for chest pain, leg swelling or palpitations.  GI:  Negative for abdominal discomfort, blood in stools or black stools or change in bowel habits.  MUSCULOSKELETAL:  See HPI.  SKIN:  Negative for lesions, rash, and itching.  PSYCH:  No mood disorder or recent psychosocial stressors.  Patients sleep is not disturbed secondary to pain.  HEMATOLOGY/LYMPHOLOGY:  Negative for prolonged bleeding, bruising easily or swollen nodes.    NEURO:   No history of headaches, syncope, paralysis, seizures or tremors.  All other reviewed and negative other than HPI.    OBJECTIVE:  Telemedicine Exam  Vitals:    11/09/22 1144   BP: (!) 140/92   Pulse: 92   Resp: 17   Weight: 93.8 kg (206 lb 10.9 oz)  Comment: total hip replacement   Height: 5' 7" (1.702 m)     Body mass index is 32.37 kg/m².   (reviewed on 11/25/2022)     GENERAL: Well appearing, in no acute distress, alert and oriented x3.  Cooperative.  PSYCH:  Mood and affect appropriate.  SKIN: Skin color & texture with no obvious abnormalities.    HEAD/FACE:  Normocephalic, atraumatic.    PULM:  No difficulty breathing. No nasal flaring. No obvious wheezing.  EXTREMITIES: No obvious deformities. Moving all extremities well, appears to have symmetric strength throughout.  MUSCULOSKELETAL: No obvious atrophy abnormalities are noted.   NEURO: No obvious neurologic deficit.   GAIT: sitting.     Physical Exam: last in clinic visit:      ASSESSMENT: 27 y.o. year old female with bilateral hip pain, consistent with     No diagnosis found.        PLAN:   - Interventions:  None at this time.      - Anticoagulation use: no no anticoagulation    - report:  Reviewed and consistent with medication use as prescribed.    - " Medications:  Start Robaxin (methocarbamol) 500mg to take 1/2 to 1 tab BID PRN muscle spasms.  she understands this could cause drowsiness.       - Therapy:  -Continue physical therapy as directed    - Imaging: Reviewed available imaging with patient and answered any questions they had regarding study.    - Follow up visit: PRN      The above plan and management options were discussed at length with patient. Patient is in agreement with the above and verbalized understanding.    - I discussed the goals of interventional chronic pain management with the patient on today's visit. We discussed a multimodal and systematic approach to pain.  This includes diagnostic and therapeutic injections, adjuvant pharmacologic treatment, physical therapy, and at times psychiatry.  I emphasized the importance of regular exercise, core strengthening and stretching, diet and weight loss as a cornerstone of long-term pain management.    - This condition does not require this patient to take time off of work, and the primary goal of our Pain Management services is to improve the patient's functional capacity.  - Patient Questions: Answered all of the patient's questions regarding diagnoses, therapy, treatment and next steps      Funmi Harris PA-C  Interventional Pain Management  [facility]    Disclaimer:  This note was prepared using voice recognition system and is likely to have sound alike errors that may have been overlooked even after proof reading.  Please call me with any questions

## 2022-11-11 ENCOUNTER — CLINICAL SUPPORT (OUTPATIENT)
Dept: REHABILITATION | Facility: HOSPITAL | Age: 27
End: 2022-11-11
Payer: MEDICAID

## 2022-11-11 ENCOUNTER — PATIENT MESSAGE (OUTPATIENT)
Dept: ORTHOPEDICS | Facility: CLINIC | Age: 27
End: 2022-11-11
Payer: MEDICAID

## 2022-11-11 DIAGNOSIS — M25.612 DECREASED RANGE OF MOTION OF LEFT SHOULDER: Primary | ICD-10-CM

## 2022-11-11 PROCEDURE — 97140 MANUAL THERAPY 1/> REGIONS: CPT | Mod: PN

## 2022-11-11 PROCEDURE — 97110 THERAPEUTIC EXERCISES: CPT | Mod: PN

## 2022-11-11 NOTE — PROGRESS NOTES
[facility]   Physical Therapist Treatment Note     Name: Miriam Buckley [place]  Clinic Number: [id number]    Therapy Diagnosis:   Encounter Diagnosis   Name Primary?    Decreased range of motion of left shoulder Yes         Physician: Michael Ramirez*    Visit Date: 11/11/2022   Physician Orders: PT Eval and Treat   Medical Diagnosis from Referral: Avascular necrosis of bone of shoulder [M87.019], Chronic left shoulder pain [M25.512, G89.29]  Evaluation Date: 6/23/2022  Authorization Period Expiration: 6/1/23  Plan of Care Expiration: 1/16/22  Visit # / Visits authorized: 7/ 24 + eval    FOTO: 1/3    Precautions: Standard, reverse TSA protocol  DOS: 6/16/22    PTA Visit #: 1/5    Time In: 1030  Time Out: 1110   Total Billable Time: 40 minutes    SUBJECTIVE     Pt reports: no complaints of pain today in the Shoulder but low back pain that started 4 days ago.  She was compliant with home exercise program.  Response to previous treatment: no issues  Functional change: improved range of motion     Pain: 0/10 no pain meds  Location: --    OBJECTIVE     Objective Measures updated at progress report unless specified.      Treatment     Miriam received the treatments listed below:      therapeutic exercises to develop strength, endurance, ROM, flexibility and posture for 30 minutes including:    +Seated Pulley Flex/Scap x3' each  +Standing Wand Flex to Max ROM, Eccentric Lowering x10  +Standing Wand Scap to Max ROM, Eccentric Lowering x10  +Supine protraction x15  +Sidelying Abduction x20  +Sidelying Flexion x20  +Sidelying Horizontal Ab x20  +Standing AAROM Wand IR Lifts x20  +Standing AAROM Wand IR Pulls x20  +Standing AAROM Towel IR x20  +Prone PressUps 2x10    manual therapy techniques:  for 10 minutes, including:  STM along incision and lats, subscap  PROM shoulder flexion and ext rotation as per protocol  PROM of Scapula  Prone P/A of Lumbar     Patient Education and Home  Exercises     Home Exercises Provided and Patient Education Provided     Education provided:   - HEP review    Written Home Exercises Provided: Patient instructed to cont prior HEP. Exercises were reviewed and Miriam was able to demonstrate them prior to the end of the session.  Miriam demonstrated good  understanding of the education provided. See EMR under Patient Instructions for exercises provided during therapy sessions    ASSESSMENT     Miriam presented to therapy with no significant complaints of pain in left shoulder but still limited with overhead range of motion. Increased pain today in Low Back that has been ongoing for 4 days.  Promoted Scapula mobility and worked to develop motor control of L Shoulder 2* PROM>AROM.  Patient continued strengthening and range of motion exercises with notable fatigue in all planes.   Low back pain decreased with Prone Pressups and PA mobs.  Will continue to progress strength per tissue tolerance.     Miriam Is progressing well towards her goals.     Pt prognosis is Good.     Pt will continue to benefit from skilled outpatient physical therapy to address the deficits listed in the problem list box on initial evaluation, provide pt/family education and to maximize pt's level of independence in the home and community environment.     Pt's spiritual, cultural and educational needs considered and pt agreeable to plan of care and goals.     Anticipated barriers to physical therapy: none    Goals:   Short Term Goals: 6 weeks   1. Patient will be IND with HEP. PROGRESSING     Long Term Goals: 12 weeks   1. Patient will improve left shoulder flexion ROM to 150 degrees. PROGRESSING  2. Pt will improve left shoulder ER ROM to 60 degrees. PROGRESSING  3. Pt will improve LUE MMT to 4+/5. PROGRESSING  4. Pt demo improvements in FOTO score to 42% limited or less. PROGRESSING     PLAN     Plan of care Certification: 10/24/2022 to 01/16/2022.     Outpatient Physical Therapy 2 times  weekly for 12 weeks to include the following interventions: Electrical Stimulation TENS, Manual Therapy, Moist Heat/ Ice, Neuromuscular Re-ed, Patient Education, Therapeutic Activities and Therapeutic Exercise, ASTYM, Kinesiotaping PRN, Functional Dry Needling    Rubi Mcgee, PT

## 2022-11-15 NOTE — PROGRESS NOTES
Orthopaedics  Post-operative follow-up    Procedure Performed:   Left humerus steven-arthroplasty      Date of Surgery: 6/16/22    Subjective: Miriam Jett is now approximately 5 months out from her shoulder surgery.  She has been compliant with post-operative restrictions and has been progressing with PT.  Her pain and function continue to improve. She is still going to PT and has been working on strengthening. This has helped her improve her ROM but she still has some limitations.    Exam:  Incision sites healed, C/D/I  No swelling or bruising noted  Fluid ROM with no crepitus  Active Supine ROM: ,   Active upright ROM: , ABD 90, ER 60  Passive supine ROM: ,   Cuff strength: IR 5/5, ER 5/5  Axillary nerve sensation and motor intact  Motor and sensory intact distally  Strong radial pulse, fingers warm and well perfused    Imaging:    X-Ray Shoulder 2 or More Views Left  Narrative: EXAMINATION:  XR SHOULDER COMPLETE 2 OR MORE VIEWS LEFT    CLINICAL HISTORY:  Presence of left artificial shoulder joint    TECHNIQUE:  Two or three views of the left shoulder were performed.    COMPARISON:  07/01/2022.    FINDINGS:  Prior humeral head arthroplasty in normal alignment.  No evidence to suggest hardware failure.  Glenoid rim appears intact.    AC joint intact.  No overlying soft tissue swelling.  Impression: Prior humeral head arthroplasty in alignment.    Electronically signed by: Jorge Lim  Date:    09/14/2022  Time:    12:57           Impression:  S/P left shoulder steven-arthoplasty     Plan:  She continues to make progress with her range of motion and function.   Advance PT per protocol - continue progressing on range of motion and work on strengthening of shoulder and periscapular musculature. I anticipate as her strength improves then so will her ROM and function  Symptomatic treatment for pain / swelling  May advance activities as reviewed today: Continue progressing on  range of motion and work on strengthening and endurance of shoulder and periscapular musculature   Instructed patient to call clinic if questions or concerns    Follow-up in 2 months for recheck.             Michael Ramirez MD    I, Flakito Sommer, acted as a scribe for Michael Ramirez MD for the duration of this office visit.

## 2022-11-16 ENCOUNTER — OFFICE VISIT (OUTPATIENT)
Dept: ORTHOPEDICS | Facility: CLINIC | Age: 27
End: 2022-11-16
Payer: MEDICAID

## 2022-11-16 ENCOUNTER — PATIENT MESSAGE (OUTPATIENT)
Dept: ORTHOPEDICS | Facility: CLINIC | Age: 27
End: 2022-11-16

## 2022-11-16 VITALS — BODY MASS INDEX: 32.33 KG/M2 | WEIGHT: 206 LBS | HEIGHT: 67 IN

## 2022-11-16 DIAGNOSIS — Z96.612 S/P SHOULDER HEMIARTHROPLASTY, LEFT: Primary | ICD-10-CM

## 2022-11-16 PROCEDURE — 99213 OFFICE O/P EST LOW 20 MIN: CPT | Mod: PBBFAC | Performed by: STUDENT IN AN ORGANIZED HEALTH CARE EDUCATION/TRAINING PROGRAM

## 2022-11-16 PROCEDURE — 99213 PR OFFICE/OUTPT VISIT, EST, LEVL III, 20-29 MIN: ICD-10-PCS | Mod: S$PBB,,, | Performed by: STUDENT IN AN ORGANIZED HEALTH CARE EDUCATION/TRAINING PROGRAM

## 2022-11-16 PROCEDURE — 1159F MED LIST DOCD IN RCRD: CPT | Mod: CPTII,,, | Performed by: STUDENT IN AN ORGANIZED HEALTH CARE EDUCATION/TRAINING PROGRAM

## 2022-11-16 PROCEDURE — 99999 PR PBB SHADOW E&M-EST. PATIENT-LVL III: ICD-10-PCS | Mod: PBBFAC,,, | Performed by: STUDENT IN AN ORGANIZED HEALTH CARE EDUCATION/TRAINING PROGRAM

## 2022-11-16 PROCEDURE — 1160F RVW MEDS BY RX/DR IN RCRD: CPT | Mod: CPTII,,, | Performed by: STUDENT IN AN ORGANIZED HEALTH CARE EDUCATION/TRAINING PROGRAM

## 2022-11-16 PROCEDURE — 1160F PR REVIEW ALL MEDS BY PRESCRIBER/CLIN PHARMACIST DOCUMENTED: ICD-10-PCS | Mod: CPTII,,, | Performed by: STUDENT IN AN ORGANIZED HEALTH CARE EDUCATION/TRAINING PROGRAM

## 2022-11-16 PROCEDURE — 3008F PR BODY MASS INDEX (BMI) DOCUMENTED: ICD-10-PCS | Mod: CPTII,,, | Performed by: STUDENT IN AN ORGANIZED HEALTH CARE EDUCATION/TRAINING PROGRAM

## 2022-11-16 PROCEDURE — 99213 OFFICE O/P EST LOW 20 MIN: CPT | Mod: S$PBB,,, | Performed by: STUDENT IN AN ORGANIZED HEALTH CARE EDUCATION/TRAINING PROGRAM

## 2022-11-16 PROCEDURE — 3008F BODY MASS INDEX DOCD: CPT | Mod: CPTII,,, | Performed by: STUDENT IN AN ORGANIZED HEALTH CARE EDUCATION/TRAINING PROGRAM

## 2022-11-16 PROCEDURE — 1159F PR MEDICATION LIST DOCUMENTED IN MEDICAL RECORD: ICD-10-PCS | Mod: CPTII,,, | Performed by: STUDENT IN AN ORGANIZED HEALTH CARE EDUCATION/TRAINING PROGRAM

## 2022-11-16 PROCEDURE — 99999 PR PBB SHADOW E&M-EST. PATIENT-LVL III: CPT | Mod: PBBFAC,,, | Performed by: STUDENT IN AN ORGANIZED HEALTH CARE EDUCATION/TRAINING PROGRAM

## 2022-11-16 RX ORDER — NAPROXEN 500 MG/1
500 TABLET ORAL 2 TIMES DAILY
Qty: 60 TABLET | Refills: 1 | Status: ON HOLD | OUTPATIENT
Start: 2022-11-16 | End: 2023-07-24 | Stop reason: HOSPADM

## 2022-11-17 ENCOUNTER — CLINICAL SUPPORT (OUTPATIENT)
Dept: REHABILITATION | Facility: HOSPITAL | Age: 27
End: 2022-11-17
Payer: MEDICAID

## 2022-11-17 DIAGNOSIS — M25.612 DECREASED RANGE OF MOTION OF LEFT SHOULDER: Primary | ICD-10-CM

## 2022-11-17 PROCEDURE — 97110 THERAPEUTIC EXERCISES: CPT | Mod: PN

## 2022-11-17 PROCEDURE — 97140 MANUAL THERAPY 1/> REGIONS: CPT | Mod: PN

## 2022-11-17 NOTE — PROGRESS NOTES
[facility]   Physical Therapist Treatment Note     Name: Miriam Jett  Clinic Number: [id number]    Therapy Diagnosis:   Encounter Diagnosis   Name Primary?    Decreased range of motion of left shoulder Yes         Physician: Michael Ramirez    Visit Date: 11/17/2022   Physician Orders: PT Eval and Treat   Medical Diagnosis from Referral: Avascular necrosis of bone of shoulder [M87.019], Chronic left shoulder pain [M25.512, G89.29]  Evaluation Date: 6/23/2022  Authorization Period Expiration: 6/1/23  Plan of Care Expiration: 1/16/22  Visit # / Visits authorized: 8/ 24 + eval    FOTO: 1/3    Precautions: Standard, reverse TSA protocol  DOS: 6/16/22    PTA Visit #: 1/5    Time In: 9:20  Time Out: 10:15   Total Billable Time: 50 minutes    SUBJECTIVE     Pt reports: improved LBP today but increased shoulder soreness.  She was compliant with home exercise program.  Response to previous treatment: no issues  Functional change: improved range of motion     Pain: 0/10 no pain meds  Location: --    OBJECTIVE     Objective Measures updated at progress report unless specified.      Treatment     Miriam received the treatments listed below:      therapeutic exercises to develop strength, endurance, ROM, flexibility and posture for 40 minutes including:    Seated Pulley Flex/Scap x3' each  Sup IR press with flexion holding ball 2x10  Chest press holding yellow ball 1x10   Qp rocks 1x10  Prone ext 2# 1x15  Prone row 2# 1x10  +Standing Wand Scap to Max ROM, Eccentric Lowering x10  ++Sidelying Flexion x10 2#  +Sidelying Horizontal Ab x15 2#  +Sidelying ER x20 2#  +serratus punch BUE holding 5# 2x10  +Prone PressUps 2x10    manual therapy techniques:  for 12 minutes, including:  STM along incision and lats, subscap, bicep  Trp release in left UT region with ART  GH mobs inf, ant, lat  PROM shoulder flexion and ext rotation as per protocol  Scapula release  Scap mobs with active  flexion  Prone P/A of thoracic    Patient Education and Home Exercises     Home Exercises Provided and Patient Education Provided     Education provided:   - HEP review    Written Home Exercises Provided: Patient instructed to cont prior HEP. Exercises were reviewed and Miriam was able to demonstrate them prior to the end of the session.  Miriam demonstrated good  understanding of the education provided. See EMR under Patient Instructions for exercises provided during therapy sessions    ASSESSMENT     Miriam presented to therapy with minimal pain in left shoulder but still limited with overhead range of motion.  Promoted Scapula mobility and worked to develop motor control of L Shoulder 2* PROM>AROM.  Pt progressed to 2# weight with SL and prone exercises, notable fatigue. Min cues needed to limited UT compensation and rib flare. Patient continued strengthening and range of motion exercises, but remains limited with ROM and strength.  Will continue to progress strength per tissue tolerance.     Miriam Is progressing well towards her goals.     Pt prognosis is Good.     Pt will continue to benefit from skilled outpatient physical therapy to address the deficits listed in the problem list box on initial evaluation, provide pt/family education and to maximize pt's level of independence in the home and community environment.     Pt's spiritual, cultural and educational needs considered and pt agreeable to plan of care and goals.     Anticipated barriers to physical therapy: none    Goals:   Short Term Goals: 6 weeks   1. Patient will be IND with HEP. PROGRESSING     Long Term Goals: 12 weeks   1. Patient will improve left shoulder flexion ROM to 150 degrees. PROGRESSING  2. Pt will improve left shoulder ER ROM to 60 degrees. PROGRESSING  3. Pt will improve LUE MMT to 4+/5. PROGRESSING  4. Pt demo improvements in FOTO score to 42% limited or less. PROGRESSING     PLAN     Plan of care Certification: 10/24/2022  to 01/16/2022.     Outpatient Physical Therapy 2 times weekly for 12 weeks to include the following interventions: Electrical Stimulation TENS, Manual Therapy, Moist Heat/ Ice, Neuromuscular Re-ed, Patient Education, Therapeutic Activities and Therapeutic Exercise, ASTYM, Kinesiotaping PRN, Functional Dry Needling    Rubi Mcgee, PT

## 2023-01-19 ENCOUNTER — TELEPHONE (OUTPATIENT)
Dept: ORTHOPEDICS | Facility: CLINIC | Age: 28
End: 2023-01-19
Payer: MEDICAID

## 2023-01-19 NOTE — TELEPHONE ENCOUNTER
Returned patient's call and let her know that we would r/s her missed appointment with Dr. Ramirez on 1/18 to 1/25 at 9am. Patient verbalized understanding and was grateful for the call back.

## 2023-01-25 ENCOUNTER — TELEPHONE (OUTPATIENT)
Dept: ORTHOPEDICS | Facility: CLINIC | Age: 28
End: 2023-01-25
Payer: MEDICAID

## 2023-01-25 NOTE — TELEPHONE ENCOUNTER
----- Message from Ada Morrissey sent at 1/25/2023 10:46 AM CST -----  Contact: Miriam Pineda needs a call back at [phone], Regards to getting her appointment reschedule.    Thanks  Td

## 2023-01-25 NOTE — TELEPHONE ENCOUNTER
Called patient and let her know that we would r/s her missed appointment with Dr. Ramirez today to 1/31 at 8:45am. Patient verbalized understanding and was grateful for the call back.

## 2023-01-31 ENCOUNTER — TELEPHONE (OUTPATIENT)
Dept: ORTHOPEDICS | Facility: CLINIC | Age: 28
End: 2023-01-31
Payer: MEDICAID

## 2023-01-31 NOTE — TELEPHONE ENCOUNTER
Returned patient's call and r/s her appointment with Dr. Ramirez from today to 2/7 at 10:45am. Patient verbalized understanding and was grateful for the call back.

## 2023-01-31 NOTE — TELEPHONE ENCOUNTER
----- Message from Fabiola Gama sent at 1/31/2023  9:54 AM CST -----  Regarding: self-  Name of Caller:  malachi    When is the first available appointment?  4/23    Symptoms:  left sholulder    Would the patient rather a call back or a response via [facility]?  phone    Best Call Back Number: [phone]

## 2023-02-08 ENCOUNTER — TELEPHONE (OUTPATIENT)
Dept: ORTHOPEDICS | Facility: CLINIC | Age: 28
End: 2023-02-08
Payer: MEDICAID

## 2023-02-08 NOTE — TELEPHONE ENCOUNTER
Called patient and r/s her missed appointment with Dr. Ramirez on 2/7 to 2/14 at 10:45am. Patient verbalized understanding and was grateful for the call back.

## 2023-02-08 NOTE — TELEPHONE ENCOUNTER
----- Message from Marivel Ngo MA sent at 2/7/2023  5:06 PM CST -----  Contact: Miriam    ----- Message -----  From: Karen Hassan  Sent: 2/7/2023  10:22 AM CST  To: [facility] Staff    Miriam could not make her appointment today and would like to be contacted again to be rescheduled before the next available.    Miriam can be reached at [phone] (home)      Thanks

## 2023-02-09 ENCOUNTER — OFFICE VISIT (OUTPATIENT)
Dept: ORTHOPEDICS | Facility: CLINIC | Age: 28
End: 2023-02-09
Payer: MEDICAID

## 2023-02-09 ENCOUNTER — HOSPITAL ENCOUNTER (OUTPATIENT)
Dept: RADIOLOGY | Facility: HOSPITAL | Age: 28
Discharge: HOME | End: 2023-02-09
Attending: ORTHOPAEDIC SURGERY
Payer: MEDICAID

## 2023-02-09 VITALS — BODY MASS INDEX: 32.32 KG/M2 | WEIGHT: 205.94 LBS | HEIGHT: 67 IN

## 2023-02-09 DIAGNOSIS — Z96.641 HISTORY OF TOTAL RIGHT HIP REPLACEMENT: ICD-10-CM

## 2023-02-09 DIAGNOSIS — Z96.612 S/P SHOULDER HEMIARTHROPLASTY, LEFT: ICD-10-CM

## 2023-02-09 DIAGNOSIS — M87.052 AVASCULAR NECROSIS OF BONE OF LEFT HIP: Primary | ICD-10-CM

## 2023-02-09 DIAGNOSIS — M25.559 HIP PAIN: ICD-10-CM

## 2023-02-09 PROCEDURE — 99214 OFFICE O/P EST MOD 30 MIN: CPT | Mod: S$PBB,,, | Performed by: ORTHOPAEDIC SURGERY

## 2023-02-09 PROCEDURE — 73502 X-RAY EXAM HIP UNI 2-3 VIEWS: CPT | Mod: TC,RT

## 2023-02-09 PROCEDURE — 1159F PR MEDICATION LIST DOCUMENTED IN MEDICAL RECORD: ICD-10-PCS | Mod: CPTII,,, | Performed by: ORTHOPAEDIC SURGERY

## 2023-02-09 PROCEDURE — 3008F BODY MASS INDEX DOCD: CPT | Mod: CPTII,,, | Performed by: ORTHOPAEDIC SURGERY

## 2023-02-09 PROCEDURE — 99999 PR PBB SHADOW E&M-EST. PATIENT-LVL II: ICD-10-PCS | Mod: PBBFAC,,, | Performed by: ORTHOPAEDIC SURGERY

## 2023-02-09 PROCEDURE — 1159F MED LIST DOCD IN RCRD: CPT | Mod: CPTII,,, | Performed by: ORTHOPAEDIC SURGERY

## 2023-02-09 PROCEDURE — 3008F PR BODY MASS INDEX (BMI) DOCUMENTED: ICD-10-PCS | Mod: CPTII,,, | Performed by: ORTHOPAEDIC SURGERY

## 2023-02-09 PROCEDURE — 73502 X-RAY EXAM HIP UNI 2-3 VIEWS: CPT | Mod: 26,RT,, | Performed by: RADIOLOGY

## 2023-02-09 PROCEDURE — 99214 PR OFFICE/OUTPT VISIT, EST, LEVL IV, 30-39 MIN: ICD-10-PCS | Mod: S$PBB,,, | Performed by: ORTHOPAEDIC SURGERY

## 2023-02-09 PROCEDURE — 73502 XR HIP WITH PELVIS WHEN PERFORMED, 2 OR 3  VIEWS RIGHT: ICD-10-PCS | Mod: 26,RT,, | Performed by: RADIOLOGY

## 2023-02-09 PROCEDURE — 99999 PR PBB SHADOW E&M-EST. PATIENT-LVL II: CPT | Mod: PBBFAC,,, | Performed by: ORTHOPAEDIC SURGERY

## 2023-02-09 PROCEDURE — 99212 OFFICE O/P EST SF 10 MIN: CPT | Mod: PBBFAC | Performed by: ORTHOPAEDIC SURGERY
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
## 2023-02-09 NOTE — PATIENT INSTRUCTIONS
Your x-ray of the right total hip is excellent as well as the left hip core decompression there is no collapse of the femoral head   You doing well you not having any pain   Avoid high impact activities but you can do bicycling elliptical and StairMaster/you can do the weight on the legs to strengthen her quads and hamstrings, you can do swimming.  Jogging is not recommended because it increases 30 times you body weight will land on the joints.  When we walk 3 times of body weight lands on the joint    Will see you in 1 year we need x-rays of both hips

## 2023-02-14 ENCOUNTER — OFFICE VISIT (OUTPATIENT)
Dept: ORTHOPEDICS | Facility: CLINIC | Age: 28
End: 2023-02-14
Payer: MEDICAID

## 2023-02-14 VITALS — BODY MASS INDEX: 32.18 KG/M2 | HEIGHT: 67 IN | WEIGHT: 205 LBS

## 2023-02-14 DIAGNOSIS — M87.019 AVASCULAR NECROSIS OF BONE OF SHOULDER: ICD-10-CM

## 2023-02-14 DIAGNOSIS — Z96.612 S/P SHOULDER HEMIARTHROPLASTY, LEFT: Primary | ICD-10-CM

## 2023-02-14 PROCEDURE — 99213 OFFICE O/P EST LOW 20 MIN: CPT | Mod: PBBFAC | Performed by: STUDENT IN AN ORGANIZED HEALTH CARE EDUCATION/TRAINING PROGRAM

## 2023-02-14 PROCEDURE — 99999 PR PBB SHADOW E&M-EST. PATIENT-LVL III: ICD-10-PCS | Mod: PBBFAC,,, | Performed by: STUDENT IN AN ORGANIZED HEALTH CARE EDUCATION/TRAINING PROGRAM

## 2023-02-14 PROCEDURE — 99213 OFFICE O/P EST LOW 20 MIN: CPT | Mod: S$PBB,,, | Performed by: STUDENT IN AN ORGANIZED HEALTH CARE EDUCATION/TRAINING PROGRAM

## 2023-02-14 PROCEDURE — 3008F PR BODY MASS INDEX (BMI) DOCUMENTED: ICD-10-PCS | Mod: CPTII,,, | Performed by: STUDENT IN AN ORGANIZED HEALTH CARE EDUCATION/TRAINING PROGRAM

## 2023-02-14 PROCEDURE — 1160F PR REVIEW ALL MEDS BY PRESCRIBER/CLIN PHARMACIST DOCUMENTED: ICD-10-PCS | Mod: CPTII,,, | Performed by: STUDENT IN AN ORGANIZED HEALTH CARE EDUCATION/TRAINING PROGRAM

## 2023-02-14 PROCEDURE — 1159F PR MEDICATION LIST DOCUMENTED IN MEDICAL RECORD: ICD-10-PCS | Mod: CPTII,,, | Performed by: STUDENT IN AN ORGANIZED HEALTH CARE EDUCATION/TRAINING PROGRAM

## 2023-02-14 PROCEDURE — 99213 PR OFFICE/OUTPT VISIT, EST, LEVL III, 20-29 MIN: ICD-10-PCS | Mod: S$PBB,,, | Performed by: STUDENT IN AN ORGANIZED HEALTH CARE EDUCATION/TRAINING PROGRAM

## 2023-02-14 PROCEDURE — 3008F BODY MASS INDEX DOCD: CPT | Mod: CPTII,,, | Performed by: STUDENT IN AN ORGANIZED HEALTH CARE EDUCATION/TRAINING PROGRAM

## 2023-02-14 PROCEDURE — 1159F MED LIST DOCD IN RCRD: CPT | Mod: CPTII,,, | Performed by: STUDENT IN AN ORGANIZED HEALTH CARE EDUCATION/TRAINING PROGRAM

## 2023-02-14 PROCEDURE — 99999 PR PBB SHADOW E&M-EST. PATIENT-LVL III: CPT | Mod: PBBFAC,,, | Performed by: STUDENT IN AN ORGANIZED HEALTH CARE EDUCATION/TRAINING PROGRAM

## 2023-02-14 PROCEDURE — 1160F RVW MEDS BY RX/DR IN RCRD: CPT | Mod: CPTII,,, | Performed by: STUDENT IN AN ORGANIZED HEALTH CARE EDUCATION/TRAINING PROGRAM

## 2023-02-14 NOTE — PROGRESS NOTES
Orthopaedics  Post-operative follow-up    Procedure Performed:   Left humerus steven-arthroplasty      Date of Surgery: 6/16/22    Subjective: Miriam Jett is now approximately 8 months out from her shoulder surgery.  She has been steadily progressing with ROM but has not gone to PT in several months.  Her pain and function continue to improve but she still has some limitations at terminal ranges of motion.    Exam:  Incision sites healed, hypertrophic, C/D/I  No swelling or bruising noted  Fluid ROM with no crepitus  Active Supine ROM: ,   Active upright ROM: , ABD 90, ER 60  Cuff strength: IR 5/5, ER 5/5  Axillary nerve sensation and motor intact  Motor and sensory intact distally  Strong radial pulse, fingers warm and well perfused    Imaging:    X-Ray Shoulder 2 or More Views Left  Narrative: EXAMINATION:  XR SHOULDER COMPLETE 2 OR MORE VIEWS LEFT    CLINICAL HISTORY:  Presence of left artificial shoulder joint    TECHNIQUE:  Two or three views of the left shoulder were performed.    COMPARISON:  07/01/2022.    FINDINGS:  Prior humeral head arthroplasty in normal alignment.  No evidence to suggest hardware failure.  Glenoid rim appears intact.    AC joint intact.  No overlying soft tissue swelling.  Impression: Prior humeral head arthroplasty in alignment.    Electronically signed by: Jorge Lim  Date:    09/14/2022  Time:    12:57           Impression:  S/P left shoulder steven-arthoplasty with capsular tightness    Plan:  She continues to make progress with her range of motion and function though it has been slow.   I would like her to restart PT to work on ROM and strength. I have no restrictions for her and encouraged her to stretch the ROM.  Symptomatic treatment for pain / swelling  May advance activities as reviewed today: Continue progressing on range of motion and work on strengthening and endurance of shoulder and periscapular musculature   Instructed patient to call  clinic if questions or concerns    Follow-up 4 months 1 year s/p sx with XR            Michael Ramirez MD    I, Sydni Rodriguez, acted as a scribe for Michael Ramirez MD for the duration of this office visit.

## 2023-02-21 ENCOUNTER — CLINICAL SUPPORT (OUTPATIENT)
Dept: REHABILITATION | Facility: HOSPITAL | Age: 28
End: 2023-02-21
Payer: MEDICAID

## 2023-02-21 DIAGNOSIS — M25.612 DECREASED RANGE OF MOTION OF LEFT SHOULDER: Primary | ICD-10-CM

## 2023-02-21 DIAGNOSIS — M87.019 AVASCULAR NECROSIS OF BONE OF SHOULDER: ICD-10-CM

## 2023-02-21 DIAGNOSIS — Z96.612 S/P SHOULDER HEMIARTHROPLASTY, LEFT: ICD-10-CM

## 2023-02-21 DIAGNOSIS — M62.81 MUSCLE WEAKNESS OF LEFT UPPER EXTREMITY: ICD-10-CM

## 2023-02-21 PROCEDURE — 97110 THERAPEUTIC EXERCISES: CPT | Mod: PN

## 2023-02-21 PROCEDURE — 97161 PT EVAL LOW COMPLEX 20 MIN: CPT | Mod: PN

## 2023-02-21 NOTE — PLAN OF CARE
[facility]  Physical Therapy Initial Evaluation    Date: 2/21/2023   Name: Miriam Jett  Clinic Number: [id number]    Therapy Diagnosis:   Encounter Diagnoses   Name Primary?    S/P shoulder hemiarthroplasty, left     Avascular necrosis of bone of shoulder     Decreased range of motion of left shoulder Yes    Muscle weakness of left upper extremity       Physician: Michael Ramirez*     Physician Orders: PT Eval and Treat  Medical Diagnosis from Referral: S/P shoulder hemiarthroplasty, left [Z96.612], Avascular necrosis of bone of shoulder [M87.019]  Evaluation Date: 2/21/2023  Authorization Period Expiration: 2/14/2024  Plan of Care Expiration: 4/18/2023  Visit # / Visits authorized: 1/1  FOTO: 1/3      Precautions: Standard    Time In: 9:05 am  Time Out: 9:50am  Total Billable Time (timed & untimed codes): 45 minutes    SUBJECTIVE   Date of onset: 6/16/2022  History of current condition - Miriam is a 27 y.o. female whom reports residual pain, limited motion and weakness following L shoulder reverse TSA last summer. Pt was attending therapy initially following surgery and abiding by protocol. However, patient was inconsistent with therapy attendance and eventually has to quit attending completely in November due to transportation issues. Pt states in time since last physical therapy treatment she has gotten less and less consistent with HEP performance. Pt states she is now motivated to address additional deficits, due to problems L shoulder is still causing with her performance of activities of daily living.       Pain:  Current 0/10, worst 5/10, best 0/10   Location: L anterior shoulder   Description: Aching and Sharp  Aggravating Factors: moving to much, reaching, lifting  Easing Factors: rest and medication       Prior Therapy: Yes  Social History: Pt lives with their family  Occupation: Pt is not working   Prior Level of Function: Independent and pain free with  all ADL, IADL, community mobility and functional activities.   Current Level of Function: Independent with all ADL, IADL, community mobility and functional activities with reports of increased pain and need for increased time and frequent breaks.      Dominant Extremity: right    Pts goals: Pt reported goals are to decrease overall pain levels in order to return to prior functional level.       Medical History:   Past Medical History:   Diagnosis Date    Miscarriage        Surgical History:   Miriam Jett  has a past surgical history that includes Injection of joint (Right, 7/12/2021); Core decompression of head of femur (Left, 7/13/2021); Bone graft (Left, 7/13/2021); Hip Arthroplasty (Right, 10/6/2021); and Partial arthroplasty of shoulder (Left, 6/16/2022).    Medications:   Miriam has a current medication list which includes the following prescription(s): acetaminophen and naproxen, and the following Facility-Administered Medications: iron sucrose in ns.    Allergies:   Review of patient's allergies indicates:  No Known Allergies     OBJECTIVE     RANGE OF MOTION:    Shoulder AROM  (Degrees) Right Left Pain/Dysfunction with Movement Goal   Shoulder Flexion (180) 180 Supine: 115   Seated: 110 A little pain in L  L: 155 seated and pain free   Shoulder Extension (60) FULL FULL -- ---   Shoulder Abduction (180) 180 Supine: 85  Seated: 70 A little pain in L  L: 125 seated and pain free    Shoulder ER (90) T7/8 T3  --- L: T7   Shoulder IR (70) T 10 L5 --- L: T12     STRENGTH:    U/E MMT Right Left Pain/Dysfunction with Movement Goal   Shoulder Flexion 4+/5 3+/5 Minimal pain reproduction on L  4+/5 L   Shoulder Extension 5/5 4/5 --- 5/5 L   Shoulder Abduction 4/5 3-/5 Pain reproduction on L  4+/5 L   Shoulder IR 4+/5 4+/5 --- ---   Shoulder ER 4+/5 4+/5 --- ---   Middle Trapezius 3+/5 3+/5 --- 4+/5 B     Sensation:  Sensation is intact to light touch in B upper extremities    Palpation: Increased tone  and tenderness noted with palpation of scar at and around incision site.    Posture:  Pt presents with postural abnormalities which include: forward head and rounded shoulders     FUNCTION:     CMS Impairment/Limitation/Restriction for FOTO Shoulder Survey    Therapist reviewed FOTO scores for Miriam on 2/21/2023.   FOTO documents entered into EPIC - see Media section.    Limitation Score: 54%         TREATMENT   Total Treatment time separate from Evaluation: (25) minutes       Miriam received the following manual therapy techniques applied for (--) minutes, including:      Miriam received therapeutic exercises to develop strength, endurance, ROM, flexibility, posture, and core stabilization for (25) minutes including:    Self AAROM L shoulder flexion x 10   L shoulder abduction wall slides AAROM x 10   Shoulder circles on wall x 20 CW/CCW   Standing rows RTB 2 x10    Miriam participated in neuromuscular re-education activities to improve: Coordination, Kinesthetic, Sense, Proprioception, and Posture for (--) minutes., including:      Miriam participated in dynamic functional therapeutic activities to improve functional performance for (--)  minutes, including:    Home Exercises and Patient Education Provided    Education/Self-Care provided:   Patient educated on the impairments noted above and the effects of physical therapy intervention to improve overall condition and QOL.   Patient was educated on all the above exercise prior/during/after for proper posture, positioning, and execution for safe performance with home exercise program.     Written Home Exercises Provided: Patient instructed to cont prior HEP.  Exercises were reviewed and Miriam was able to demonstrate them prior to the end of the session.  Miriam demonstrated good understanding of the education provided.     See EMR under Patient Instructions for exercises provided 2/21/2023.    ASSESSMENT   Miriam is a 27 y.o. female referred to  "outpatient Physical Therapy with a medical diagnosis of 7 months s/p left shoulder steven-arthroplasty, following avascular necrosis of bone of shoulder.  Pt presents with impairments including: decreased ROM, decreased strength, decreased joint mobility, decreased muscle length, and decreased overall function. These limitations are limiting patient function with reaching and lifting activities around home and community     Pt prognosis is Good.   Pt will benefit from skilled outpatient Physical Therapy to address the deficits stated above and in the chart below, provide pt/family education, and to maximize pt's level of independence.     Plan of care discussed with patient: Yes  Pt's spiritual, cultural and educational needs considered and patient is agreeable to the plan of care and goals as stated below:     Anticipated Barriers for therapy: adherence to treatment plan    Medical Necessity is demonstrated by the following  History  Co-morbidities and personal factors that may impact the plan of care Co-morbidities:   high BMI and prior hip surgery    Personal Factors:   no deficits     low   Examination  Body Structures and Functions, activity limitations and participation restrictions that may impact the plan of care Body Regions:   upper extremities    Body Systems:    ROM  strength  motor control    Participation Restrictions:   See above in "Current Level of Function"     Activity limitations:   Learning and applying knowledge  no deficits    General Tasks and Commands  no deficits    Communication  no deficits    Mobility  lifting and carrying objects    Self care  caring for body parts (brushing teeth, shaving, grooming)    Domestic Life  cooking  doing house work (cleaning house, washing dishes, laundry)    Interactions/Relationships  no deficits    Life Areas  no deficits    Community and Social Life  community life  recreation and leisure         low   Clinical Presentation stable and uncomplicated low "   Decision Making/ Complexity Score: low       GOALS:    Short Term Goals:  4 weeks Progress   2/21/2023   Pain: Pt will demonstrate improved pain by reports of less than or equal to 3/10 worst pain on the verbal rating scale in order to progress toward maximal functional ability and improve QOL. PC   Function: Patient will demonstrate improved function as indicated by a functional limitation score of less than or equal to 45 out of 100 on FOTO. PC   HEP: Patient will demonstrate independence with HEP in order to progress toward functional independence. PC     Long Term Goals:  8 weeks Progress  2/21/2023   Function: Patient will demonstrate improved function as indicated by a functional limitation score of less than or equal to 30 out of 100 on FOTO. PC   Mobility: Patient will improve AROM to stated goals, listed in objective measures above, in order to return to maximal functional potential and improve quality of life. PC   Strength: Patient will improve strength to stated goals, listed in objective measures above, in order to improve functional independence and quality of life. PC   Patient will return to normal ADL's, IADL's, community involvement, recreational activities, and work-related activities without reported pain and maximal function.  PC     Goals Key:  PC= progressing/continue; PM= partially met;        DC= discontinue    PLAN   Plan of care Certification: 2/21/2023 to 4/18/2023    Outpatient Physical Therapy 2 times weekly for 8 weeks to include any combination of the following interventions: virtual visits, dry needling, modalities, electrical stimulation (IFC, Pre-Mod, Attended with Functional Dry Needling), Aquatic Therapy, Cervical/Lumbar Traction, Manual Therapy, Neuromuscular Re-ed, Patient Education, Self Care, Therapeutic Activites, Therapeutic Exercise, and Ultrasound     Thank you for this referral.    These services are reasonable and necessary for the conditions set forth above while  under my care.    Eunice Ríos, PT

## 2023-02-28 ENCOUNTER — CLINICAL SUPPORT (OUTPATIENT)
Dept: REHABILITATION | Facility: HOSPITAL | Age: 28
End: 2023-02-28
Payer: MEDICAID

## 2023-02-28 DIAGNOSIS — M62.81 MUSCLE WEAKNESS OF LEFT UPPER EXTREMITY: Primary | ICD-10-CM

## 2023-02-28 PROCEDURE — 97140 MANUAL THERAPY 1/> REGIONS: CPT | Mod: PN

## 2023-02-28 PROCEDURE — 97110 THERAPEUTIC EXERCISES: CPT | Mod: PN

## 2023-02-28 NOTE — PROGRESS NOTES
"  [facility]   Physical Therapy Treatment Note     Name: Miriam Buckley [place]  Clinic Number: [id number]    Therapy Diagnosis:   Encounter Diagnosis   Name Primary?    Muscle weakness of left upper extremity Yes     Physician: Michael Ramirez    Visit Date: 2/28/2023    Physician Orders: PT Eval and Treat  Medical Diagnosis from Referral: S/P shoulder hemiarthroplasty, left [Z96.612], Avascular necrosis of bone of shoulder [M87.019]  Evaluation Date: 2/21/2023  Authorization Period Expiration: 2/14/2024  Plan of Care Expiration: 4/18/2023  Visit # / Visits authorized: 1/1  FOTO: 1/3    PTA Visit #: 0/5     Time In: 8:45 AM  Time Out: 9:35  Total Billable Time: 40 minutes    SUBJECTIVE     Pt reports: Minimal pain/soreness since previous visit. 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  She was compliant with home exercise program.  Response to previous treatment: Minimal soreness  Functional change: N/a    Pain: 0/10  Location: Left shoulder    OBJECTIVE     Objective Measures updated at progress report unless specified.     Treatment     Miriam received the treatments listed below:      therapeutic exercises to develop strength, endurance, ROM, and flexibility for 25 minutes including:  Supine wand flexion (3 x 10 x 5", 2 lbs)  Semi reclined wand flexion (3 x 10)  Shoulder circles (3 x 10)  Standing rows (3 x 10, RTB)  IR/ER (3 x 10, yellow)  Wall climbs with ladder (3 x 10)  UBE (3/3)    manual therapy techniques: Joint mobilizations, Manual traction, and Soft tissue Mobilization were applied to the: L shoulder for 15 minutes, including:  GII/III Inf + Post GH Mobilizations  PROM into scaption and flexion    neuromuscular re-education activities to improve: Balance, Coordination, Kinesthetic, Sense, Proprioception, and Posture for - minutes. The following activities were included:  -    therapeutic activities to improve functional performance for -  minutes, " including:  -    Patient Education and Home Exercises     Home Exercises Provided and Patient Education Provided     Education provided:   - Continued HEP    Written Home Exercises Provided: yes. Exercises were reviewed and Miriam was able to demonstrate them prior to the end of the session.  Miriam demonstrated good  understanding of the education provided. See EMR under Patient Instructions for exercises provided during therapy sessions    ASSESSMENT     Pt demonstrates independence c HEP c minimal cuing required for desired form. Pt demonstrates improved AROM within visit and reports minimal pain with elevation. Pt requires minimal cuing throughout treatment for desired performance of exercise.     Miriam Is progressing well towards her goals.   Pt prognosis is Good.     Pt will continue to benefit from skilled outpatient physical therapy to address the deficits listed in the problem list box on initial evaluation, provide pt/family education and to maximize pt's level of independence in the home and community environment.     Pt's spiritual, cultural and educational needs considered and pt agreeable to plan of care and goals.     Anticipated barriers to physical therapy: adherence to treatment plan    GOALS:     Short Term Goals:  4 weeks Progress   2/21/2023   Pain: Pt will demonstrate improved pain by reports of less than or equal to 3/10 worst pain on the verbal rating scale in order to progress toward maximal functional ability and improve QOL. PC   Function: Patient will demonstrate improved function as indicated by a functional limitation score of less than or equal to 45 out of 100 on FOTO. PC   HEP: Patient will demonstrate independence with HEP in order to progress toward functional independence. PC      Long Term Goals:  8 weeks Progress  2/21/2023   Function: Patient will demonstrate improved function as indicated by a functional limitation score of less than or equal to 30 out of 100 on FOTO.  PC   Mobility: Patient will improve AROM to stated goals, listed in objective measures above, in order to return to maximal functional potential and improve quality of life. PC   Strength: Patient will improve strength to stated goals, listed in objective measures above, in order to improve functional independence and quality of life. PC   Patient will return to normal ADL's, IADL's, community involvement, recreational activities, and work-related activities without reported pain and maximal function.  PC      Goals Key:  PC= progressing/continue;        PM= partially met;             DC= discontinue    PLAN     Plan of care Certification: 2/21/2023 to 4/18/2023     Outpatient Physical Therapy 2 times weekly for 8 weeks to include any combination of the following interventions: virtual visits, dry needling, modalities, electrical stimulation (IFC, Pre-Mod, Attended with Functional Dry Needling), Aquatic Therapy, Cervical/Lumbar Traction, Manual Therapy, Neuromuscular Re-ed, Patient Education, Self Care, Therapeutic Activites, Therapeutic Exercise, and Ultrasound     Samia Toth, PT

## 2023-03-03 ENCOUNTER — CLINICAL SUPPORT (OUTPATIENT)
Dept: REHABILITATION | Facility: HOSPITAL | Age: 28
End: 2023-03-03
Payer: MEDICAID

## 2023-03-03 DIAGNOSIS — M62.81 MUSCLE WEAKNESS OF LEFT UPPER EXTREMITY: Primary | ICD-10-CM

## 2023-03-03 PROCEDURE — 97112 NEUROMUSCULAR REEDUCATION: CPT | Mod: PN

## 2023-03-03 PROCEDURE — 97110 THERAPEUTIC EXERCISES: CPT | Mod: PN

## 2023-03-03 PROCEDURE — 97140 MANUAL THERAPY 1/> REGIONS: CPT | Mod: PN

## 2023-03-03 NOTE — PROGRESS NOTES
"  [facility]   Physical Therapy Treatment Note     Name: Miriam Buckley [place]  Clinic Number: [id number]    Therapy Diagnosis:   Encounter Diagnosis   Name Primary?    Muscle weakness of left upper extremity Yes     Physician: Michael Ramirez    Visit Date: 3/3/2023    Physician Orders: PT Eval and Treat  Medical Diagnosis from Referral: S/P shoulder hemiarthroplasty, left [Z96.612], Avascular necrosis of bone of shoulder [M87.019]  Evaluation Date: 2/21/2023  Authorization Period Expiration: 2/14/2024  Plan of Care Expiration: 4/18/2023  Visit # / Visits authorized: 2/20 plus eval  FOTO: 1/3    PTA Visit #: 0/5     Time In: 8:15 AM  Time Out: 9:10  Total Billable Time: 50 minutes    SUBJECTIVE     Pt reports: left shoulder pain/soreness after previous visit. 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  She was compliant with home exercise program.  Response to previous treatment: Minimal soreness  Functional change: N/a    Pain: 0/10  Location: Left shoulder    OBJECTIVE     Objective Measures updated at progress report unless specified.     Treatment     Miriam received the treatments listed below:      therapeutic exercises to develop strength, endurance, ROM, and flexibility for 30 minutes including:  Supine wand flexion (3 x 10 x 5", 4 lbs)  Standing wand flexion to shoulder height (2 x 10)  Shoulder circles (3 x 10)  Standing shrugs 10# 2x10  SL left horiz abd 2x10  SL ER 2x10  Serratus punches 2x10  IR/ER (3 x 10, yellow)  Wall climbs with ladder (3 x 10)  UBE (3/3)  Elliptical 5 min  [place] carry 10# LUE 2 laps    manual therapy techniques: Joint mobilizations, Manual traction, and Soft tissue Mobilization were applied to the: L shoulder for 15 minutes, including:  GII/III Inf + Post GH Mobilizations  PROM into scaption and flexion    neuromuscular re-education activities to improve: Balance, Coordination, Kinesthetic, Sense, Proprioception, and Posture for 15 " "minutes. The following activities were included:  Prone Ts for mid trap fac with AA  Prone row and ext for scap stabilization and RTC fac  PNF iso hold with  mulit angle pertubation 3x10"      therapeutic activities to improve functional performance for -  minutes, including:  -    Patient Education and Home Exercises     Home Exercises Provided and Patient Education Provided     Education provided:   - Continued HEP    Written Home Exercises Provided: yes. Exercises were reviewed and Miriam was able to demonstrate them prior to the end of the session.  Miriam demonstrated good  understanding of the education provided. See EMR under Patient Instructions for exercises provided during therapy sessions    ASSESSMENT     Pt demonstrates independence c HEP c minimal cuing required for desired form. Pt demonstrates improved AROM within visit and reports minimal pain with elevation. Pt encouraged to increase compliance with HEP.    Miriam Is progressing well towards her goals.   Pt prognosis is Good.     Pt will continue to benefit from skilled outpatient physical therapy to address the deficits listed in the problem list box on initial evaluation, provide pt/family education and to maximize pt's level of independence in the home and community environment.     Pt's spiritual, cultural and educational needs considered and pt agreeable to plan of care and goals.     Anticipated barriers to physical therapy: adherence to treatment plan    GOALS:     Short Term Goals:  4 weeks Progress   2/21/2023   Pain: Pt will demonstrate improved pain by reports of less than or equal to 3/10 worst pain on the verbal rating scale in order to progress toward maximal functional ability and improve QOL. PC   Function: Patient will demonstrate improved function as indicated by a functional limitation score of less than or equal to 45 out of 100 on FOTO. PC   HEP: Patient will demonstrate independence with HEP in order to progress toward " functional independence. PC      Long Term Goals:  8 weeks Progress  2/21/2023   Function: Patient will demonstrate improved function as indicated by a functional limitation score of less than or equal to 30 out of 100 on FOTO. PC   Mobility: Patient will improve AROM to stated goals, listed in objective measures above, in order to return to maximal functional potential and improve quality of life. PC   Strength: Patient will improve strength to stated goals, listed in objective measures above, in order to improve functional independence and quality of life. PC   Patient will return to normal ADL's, IADL's, community involvement, recreational activities, and work-related activities without reported pain and maximal function.  PC      Goals Key:  PC= progressing/continue;        PM= partially met;             DC= discontinue    PLAN     Plan of care Certification: 2/21/2023 to 4/18/2023     Outpatient Physical Therapy 2 times weekly for 8 weeks to include any combination of the following interventions: virtual visits, dry needling, modalities, electrical stimulation (IFC, Pre-Mod, Attended with Functional Dry Needling), Aquatic Therapy, Cervical/Lumbar Traction, Manual Therapy, Neuromuscular Re-ed, Patient Education, Self Care, Therapeutic Activites, Therapeutic Exercise, and Ultrasound     Rubi Mcgee, PT

## 2023-03-21 ENCOUNTER — CLINICAL SUPPORT (OUTPATIENT)
Dept: REHABILITATION | Facility: HOSPITAL | Age: 28
End: 2023-03-21
Payer: MEDICAID

## 2023-03-21 DIAGNOSIS — M62.81 MUSCLE WEAKNESS OF LEFT UPPER EXTREMITY: Primary | ICD-10-CM

## 2023-03-21 PROCEDURE — 97110 THERAPEUTIC EXERCISES: CPT | Mod: PN

## 2023-03-21 PROCEDURE — 97140 MANUAL THERAPY 1/> REGIONS: CPT | Mod: PN

## 2023-03-22 NOTE — PROGRESS NOTES
"  [facility]   Physical Therapy Treatment Note     Name: Miriam Buckley [place]  Clinic Number: [id number]    Therapy Diagnosis:   Encounter Diagnosis   Name Primary?    Muscle weakness of left upper extremity Yes     Physician: Michael Ramirez    Visit Date: 3/21/2023    Physician Orders: PT Eval and Treat  Medical Diagnosis from Referral: S/P shoulder hemiarthroplasty, left [Z96.612], Avascular necrosis of bone of shoulder [M87.019]  Evaluation Date: 2/21/2023  Authorization Period Expiration: 2/14/2024  Plan of Care Expiration: 4/18/2023  Visit # / Visits authorized: 3/20 plus eval  FOTO: 1/3    PTA Visit #: 0/5     Time In: 9:30 AM  Time Out: 10:30  Total Billable Time: 60 minutes    SUBJECTIVE     Pt reports: left shoulder soreness. 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  She was compliant with home exercise program.  Response to previous treatment: Minimal soreness  Functional change: N/a    Pain: 0/10  Location: Left shoulder    OBJECTIVE     Objective Measures updated at progress report unless specified.     Treatment     Miriam received the treatments listed below:      therapeutic exercises to develop strength, endurance, ROM, and flexibility for 45 minutes including:  Supine wand flexion (3 x 10 x 5", 4 lbs)  Standing shoulder flexion to shoulder height 1x10 in mirror to limit UT compensation  Shoulder circles (3 x 10)  Seated AA LUE flexion with elbow flexion to touch head once at mat ROM  SL left horiz abd 2x10  SL ER 2x10  Serratus punches 2x10  IR/ER (3 x 10, yellow)  Wall climbs (3 x 10)  UBE (3/3)  [place] carry 10# LUE 2 laps  Seated Ts 3x5  [place] and arrow rtb 3x3 B    manual therapy techniques: Joint mobilizations, Manual traction, and Soft tissue Mobilization were applied to the: L shoulder for 15 minutes, including:  GII/III Inf + Post GH Mobilizations  PROM into scaption and flexion    neuromuscular re-education activities to improve: Balance, " "Coordination, Kinesthetic, Sense, Proprioception, and Posture for 00 minutes. The following activities were included:  Prone Ts for mid trap fac with AA  Prone row and ext for scap stabilization and RTC fac  PNF iso hold with  mulit angle pertubation 3x10"      therapeutic activities to improve functional performance for -  minutes, including:  -    Patient Education and Home Exercises     Home Exercises Provided and Patient Education Provided     Education provided:   - Continued HEP    Written Home Exercises Provided: yes. Exercises were reviewed and Miriam was able to demonstrate them prior to the end of the session.  Miriam demonstrated good  understanding of the education provided. See EMR under Patient Instructions for exercises provided during therapy sessions    ASSESSMENT     Pt provided new exercises to assist with grooming her own hair. She is currently very limited due left shoulder ROM and strength deficits. Pt did need verbal and visual cue in mirror to reduce UT compensation. Pt demonstrates improved AROM within visit and reports minimal pain with elevation with notable fatigue. Pt encouraged to increase compliance with HEP.    Miriam Is progressing well towards her goals.   Pt prognosis is Good.     Pt will continue to benefit from skilled outpatient physical therapy to address the deficits listed in the problem list box on initial evaluation, provide pt/family education and to maximize pt's level of independence in the home and community environment.     Pt's spiritual, cultural and educational needs considered and pt agreeable to plan of care and goals.     Anticipated barriers to physical therapy: adherence to treatment plan    GOALS:     Short Term Goals:  4 weeks Progress   2/21/2023   Pain: Pt will demonstrate improved pain by reports of less than or equal to 3/10 worst pain on the verbal rating scale in order to progress toward maximal functional ability and improve QOL. PC   Function: " Patient will demonstrate improved function as indicated by a functional limitation score of less than or equal to 45 out of 100 on FOTO. PC   HEP: Patient will demonstrate independence with HEP in order to progress toward functional independence. PC      Long Term Goals:  8 weeks Progress  2/21/2023   Function: Patient will demonstrate improved function as indicated by a functional limitation score of less than or equal to 30 out of 100 on FOTO. PC   Mobility: Patient will improve AROM to stated goals, listed in objective measures above, in order to return to maximal functional potential and improve quality of life. PC   Strength: Patient will improve strength to stated goals, listed in objective measures above, in order to improve functional independence and quality of life. PC   Patient will return to normal ADL's, IADL's, community involvement, recreational activities, and work-related activities without reported pain and maximal function.  PC      Goals Key:  PC= progressing/continue;        PM= partially met;             DC= discontinue    PLAN     Plan of care Certification: 2/21/2023 to 4/18/2023     Outpatient Physical Therapy 2 times weekly for 8 weeks to include any combination of the following interventions: virtual visits, dry needling, modalities, electrical stimulation (IFC, Pre-Mod, Attended with Functional Dry Needling), Aquatic Therapy, Cervical/Lumbar Traction, Manual Therapy, Neuromuscular Re-ed, Patient Education, Self Care, Therapeutic Activites, Therapeutic Exercise, and Ultrasound     Rubi Mcgee, PT

## 2023-03-28 ENCOUNTER — CLINICAL SUPPORT (OUTPATIENT)
Dept: REHABILITATION | Facility: HOSPITAL | Age: 28
End: 2023-03-28
Payer: MEDICAID

## 2023-03-28 DIAGNOSIS — M62.81 MUSCLE WEAKNESS OF LEFT UPPER EXTREMITY: Primary | ICD-10-CM

## 2023-03-28 PROCEDURE — 97110 THERAPEUTIC EXERCISES: CPT | Mod: PN

## 2023-03-28 NOTE — PROGRESS NOTES
"  [facility]   Physical Therapy Treatment Note     Name: Miriam Buckley [place]  Clinic Number: [id number]    Therapy Diagnosis:   Encounter Diagnosis   Name Primary?    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  She was compliant with home exercise program.  Response to previous treatment: Minimal soreness  Functional change: N/a    Pain: 0/10  Location: Left shoulder    OBJECTIVE     Objective Measures updated at progress report unless specified.     Treatment     Miriam received the treatments listed below:      therapeutic exercises to develop strength, endurance, ROM, and flexibility for 20 minutes including:  Supine wand flexion (3 x 10 x 5", 4 lbs)  Standing shoulder flexion to shoulder height 1x10 in mirror to limit UT compensation  Seated AA LUE flexion with elbow flexion to touch head once at mat ROM  SL left horiz abd 2x10  SL ER 2x10  IR/ER (3 x 10, yellow)  Wall climbs (3 x 10)  UBE (3/3)  [place] carry 10# LUE 2 laps    manual therapy techniques: Joint mobilizations, Manual traction, and Soft tissue Mobilization were applied to the: L shoulder for 15 minutes, including:  GII/III Inf + Post GH Mobilizations  PROM into scaption and flexion    neuromuscular re-education activities to improve: Balance, Coordination, Kinesthetic, Sense, Proprioception, and Posture for 20 minutes. The " "following activities were included:  Prone Ts for mid trap fac with AA  Prone row and ext for scap stabilization and RTC fac  PNF iso hold with  mulit angle pertubation 3x10"  Seated abd 4x3  Seated ball press out for scap mobility and postural awareness 3x4  Ball press up with IR and postural control 3x3   Sup press up for RTC fac and scap px 1x10  Serratus pucnes 6# for serratus ant fac    therapeutic activities to improve functional performance for -  minutes, including:  -    Patient Education and Home Exercises     Home Exercises Provided and Patient Education Provided     Education provided:   - Continued HEP    Written Home Exercises Provided: yes. Exercises were reviewed and Miriam was able to demonstrate them prior to the end of the session.  Miriam demonstrated good  understanding of the education provided. See EMR under Patient Instructions for exercises provided during therapy sessions    ASSESSMENT     Pt demonstrates improved left shoulder ROM today. She reports she was able to assist with grooming her hair. Pt does lack full ROM in all planes and progressing slowly, she was able to reduce upper trap compensation and rib flare with seated exercises with less Vc and visual cues. Pt encouraged to cont with current HEP. Pt did need verbal and visual cue in mirror to reduce UT compensation. Pt demonstrates improved AROM within visit and reports minimal pain with elevation with notable fatigue. Pt encouraged to increase compliance with HEP.    Miriam Is progressing well towards her goals.   Pt prognosis is Good.     Pt will continue to benefit from skilled outpatient physical therapy to address the deficits listed in the problem list box on initial evaluation, provide pt/family education and to maximize pt's level of independence in the home and community environment.     Pt's spiritual, cultural and educational needs considered and pt agreeable to plan of care and goals.     Anticipated barriers to " physical therapy: adherence to treatment plan    GOALS:     Short Term Goals:  4 weeks Progress   2/21/2023   Pain: Pt will demonstrate improved pain by reports of less than or equal to 3/10 worst pain on the verbal rating scale in order to progress toward maximal functional ability and improve QOL. PC   Function: Patient will demonstrate improved function as indicated by a functional limitation score of less than or equal to 45 out of 100 on FOTO. PC   HEP: Patient will demonstrate independence with HEP in order to progress toward functional independence. PC      Long Term Goals:  8 weeks Progress  2/21/2023   Function: Patient will demonstrate improved function as indicated by a functional limitation score of less than or equal to 30 out of 100 on FOTO. PC   Mobility: Patient will improve AROM to stated goals, listed in objective measures above, in order to return to maximal functional potential and improve quality of life. PC   Strength: Patient will improve strength to stated goals, listed in objective measures above, in order to improve functional independence and quality of life. PC   Patient will return to normal ADL's, IADL's, community involvement, recreational activities, and work-related activities without reported pain and maximal function.  PC      Goals Key:  PC= progressing/continue;        PM= partially met;             DC= discontinue    PLAN     Plan of care Certification: 2/21/2023 to 4/18/2023     Outpatient Physical Therapy 2 times weekly for 8 weeks to include any combination of the following interventions: virtual visits, dry needling, modalities, electrical stimulation (IFC, Pre-Mod, Attended with Functional Dry Needling), Aquatic Therapy, Cervical/Lumbar Traction, Manual Therapy, Neuromuscular Re-ed, Patient Education, Self Care, Therapeutic Activites, Therapeutic Exercise, and Ultrasound     Rubi Mcgee, PT

## 2023-03-30 ENCOUNTER — CLINICAL SUPPORT (OUTPATIENT)
Dept: REHABILITATION | Facility: HOSPITAL | Age: 28
End: 2023-03-30
Payer: MEDICAID

## 2023-03-30 DIAGNOSIS — M62.81 MUSCLE WEAKNESS OF LEFT UPPER EXTREMITY: Primary | ICD-10-CM

## 2023-03-30 PROCEDURE — 97110 THERAPEUTIC EXERCISES: CPT | Mod: PN,CQ

## 2023-03-30 NOTE — PROGRESS NOTES
"  [facility]   Physical Therapy Treatment Note     Name: Miriam Buckley [place]  Clinic Number: [id number]    Therapy Diagnosis:   Encounter Diagnosis   Name Primary?    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 Patient reports continuing to have limited Range of Motion but admits to infrequent usage of Left Upper Extremity.   She was compliant with home exercise program.  Response to previous treatment: Minimal soreness  Functional change: N/a    Pain: 0/10 at time of appointment; 4-5/10 when attempting to do a lot  Location: Left shoulder    OBJECTIVE     Objective Measures updated at progress report unless specified.     Treatment     Miriam received the treatments listed below:      therapeutic exercises to develop strength, endurance, ROM, and flexibility for 20 minutes including:  UBE (3/3)  Supine wand flexion (2 x 10 x 10", 4 lbs)  Supine shoulder External Rotation with wand; 10x10"  Standing shoulder flexion to shoulder height 1x10 in mirror to limit UT compensation  SL ER 2x10  IR/ER (3 x 10, yellow)  Wall climbs (3 x 10)  [place] carry 10# LUE 2 laps  Sleeper stretch; 10x10"    manual therapy techniques: Joint mobilizations, Manual traction, and Soft tissue Mobilization were applied to the: L shoulder for 15 minutes, including:  GII/III Inf + Post GH Mobilizations  PROM into scaption and " flexion    neuromuscular re-education activities to improve: Balance, Coordination, Kinesthetic, Sense, Proprioception, and Posture for 20 minutes. The following activities were included:    Prone Ts for mid trap fac with AA; 2x12  Prone Y; 2x12  Prone row and ext for scap stabilization and RTC fac  PNF D1 and D2 x15 each  Seated horizontal Abd; 3x5  Seated abd 4x3  Seated ball press out for scap mobility and postural awareness 3x4  Ball press up with IR and postural control 3x3   Sup press up for RTC fac and scap px 1x10  Serratus pucnes 6# for serratus ant fac    therapeutic activities to improve functional performance for -  minutes, including:  -    Patient Education and Home Exercises     Home Exercises Provided and Patient Education Provided     Education provided:   - Continued HEP    Written Home Exercises Provided: yes. Exercises were reviewed and Miriam was able to demonstrate them prior to the end of the session.  Miriam demonstrated good  understanding of the education provided. See EMR under Patient Instructions for exercises provided during therapy sessions    ASSESSMENT     Continued Range of Motion limitations present with all shoulder Range of Motion especially Internal Rotation, External Rotation, and Abduction. Patient continues to demonstrate compensatory patterns when attempting to further push shoulder Range of Motion. Discussed with patient about consistency of attempting to use Left Upper Extremity throughout the day for gradual improvements of functional endurance. Educated patient on avoiding painful Range of Motion and mindfulness of when she begins to experience exacerbations to stop and allow for rest.     Miriam Is progressing well towards her goals.   Pt prognosis is Good.     Pt will continue to benefit from skilled outpatient physical therapy to address the deficits listed in the problem list box on initial evaluation, provide pt/family education and to maximize pt's level of  independence in the home and community environment.     Pt's spiritual, cultural and educational needs considered and pt agreeable to plan of care and goals.     Anticipated barriers to physical therapy: adherence to treatment plan    GOALS:     Short Term Goals:  4 weeks Progress   2/21/2023   Pain: Pt will demonstrate improved pain by reports of less than or equal to 3/10 worst pain on the verbal rating scale in order to progress toward maximal functional ability and improve QOL. PC   Function: Patient will demonstrate improved function as indicated by a functional limitation score of less than or equal to 45 out of 100 on FOTO. PC   HEP: Patient will demonstrate independence with HEP in order to progress toward functional independence. PC      Long Term Goals:  8 weeks Progress  2/21/2023   Function: Patient will demonstrate improved function as indicated by a functional limitation score of less than or equal to 30 out of 100 on FOTO. PC   Mobility: Patient will improve AROM to stated goals, listed in objective measures above, in order to return to maximal functional potential and improve quality of life. PC   Strength: Patient will improve strength to stated goals, listed in objective measures above, in order to improve functional independence and quality of life. PC   Patient will return to normal ADL's, IADL's, community involvement, recreational activities, and work-related activities without reported pain and maximal function.  PC      Goals Key:  PC= progressing/continue;        PM= partially met;             DC= discontinue    PLAN     Plan of care Certification: 2/21/2023 to 4/18/2023     Outpatient Physical Therapy 2 times weekly for 8 weeks to include any combination of the following interventions: virtual visits, dry needling, modalities, electrical stimulation (IFC, Pre-Mod, Attended with Functional Dry Needling), Aquatic Therapy, Cervical/Lumbar Traction, Manual Therapy, Neuromuscular Re-ed, Patient  Education, Self Care, Therapeutic Activites, Therapeutic Exercise, and Ultrasound     Dean Eller, ALINA

## 2023-04-11 ENCOUNTER — PATIENT MESSAGE (OUTPATIENT)
Dept: RESEARCH | Facility: HOSPITAL | Age: 28
End: 2023-04-11
Payer: MEDICAID

## 2023-04-19 ENCOUNTER — DOCUMENTATION ONLY (OUTPATIENT)
Dept: REHABILITATION | Facility: HOSPITAL | Age: 28
End: 2023-04-19

## 2023-04-19 NOTE — PROGRESS NOTES
Attempted to call patient regarding missed appointment. Patient did not answer and does not have a voice mail setup.

## 2023-05-03 ENCOUNTER — TELEPHONE (OUTPATIENT)
Dept: ORTHOPEDICS | Facility: CLINIC | Age: 28
End: 2023-05-03
Payer: MEDICAID

## 2023-05-03 NOTE — TELEPHONE ENCOUNTER
LVM for patient to confirm she wanted a new referral and let her know to give us a call back or send a message and we will get that entered for her. -NS    ----- Message from Reshma Silva sent at 5/3/2023 11:43 AM CDT -----  Who Called: Patient    What is the request in detail: Requesting call back to discuss getting a referral set up to continue physical therapy at [facility] out patient rehab. Patient has not been placed back onto the schedule as they are awaiting this referral. Please advise.     Can the clinic reply by [facility]? No    Best Call Back Number: [phone]      Additional Information:     Symptom Screen outcome:

## 2023-05-19 ENCOUNTER — PATIENT MESSAGE (OUTPATIENT)
Dept: ORTHOPEDICS | Facility: CLINIC | Age: 28
End: 2023-05-19
Payer: MEDICAID

## 2023-05-22 ENCOUNTER — CLINICAL SUPPORT (OUTPATIENT)
Dept: REHABILITATION | Facility: HOSPITAL | Age: 28
End: 2023-05-22
Payer: MEDICAID

## 2023-05-22 DIAGNOSIS — M62.81 MUSCLE WEAKNESS OF LEFT UPPER EXTREMITY: Primary | ICD-10-CM

## 2023-05-22 PROCEDURE — 97110 THERAPEUTIC EXERCISES: CPT | Mod: PN

## 2023-05-22 NOTE — PROGRESS NOTES
"  [facility]   Physical Therapy Treatment Note     Name: Miriam Buckley [place]  Clinic Number: [id number]    Therapy Diagnosis:   Encounter Diagnosis   Name Primary?    Muscle weakness of left upper extremity Yes     Physician: Michael Ramirez    Visit Date: 5/22/2023    Physician Orders: PT Eval and Treat  Medical Diagnosis from Referral: S/P shoulder hemiarthroplasty, left [Z96.612], Avascular necrosis of bone of shoulder [M87.019]  Evaluation Date:5/22/2023  Authorization Period Expiration: 12/31/2023  Plan of Care Expiration: 7/30/2023  Visit # / Visits authorized: 6/20 plus eval  FOTO: 1/3    PTA Visit #: 1/5     Time In: 9:05 AM  Time Out: 10:00  Total Billable Time: 55 minutes    SUBJECTIVE     Pt reports:  she cont to feel stiff and limited but slowly improving    She was compliant with home exercise program.  Response to previous treatment: Minimal soreness  Functional change: N/a    Pain: 0/10 at time of appointment; 4-5/10 when attempting to do a lot  Location: Left shoulder    OBJECTIVE     Objective Measures updated at progress report unless specified.   5/22/2023  Left Shoulder ROM  AAROM supine  Flexion: 140 degrees (AROM seated 121 deg)  Abduction: 98 degrees (seated 90 deg)  ER: 45 degrees (seated 60 deg)      Treatment     Miriam received the treatments listed below:      therapeutic exercises to develop strength, endurance, ROM, and flexibility for 20 minutes including:  UBE (3/3)  Supine wand flexion (2 x 10 x 10", 4 lbs)  Supine shoulder External Rotation with wand; 10x10"  Standing shoulder flexion to shoulder height 1x10 in mirror to limit UT compensation  SL ER 2x10  IR/ER (3 x 10, yellow)  Wall climbs (3 x 10)  [place] carry 10# LUE 2 laps  Sleeper stretch; 10x10"    manual therapy techniques: Joint mobilizations, Manual traction, and Soft tissue Mobilization were applied to the: L shoulder for 12 minutes, including:  GII/III Inf + Post GH " Mobilizations  PROM into scaption and flexion    neuromuscular re-education activities to improve: Balance, Coordination, Kinesthetic, Sense, Proprioception, and Posture for 20 minutes. The following activities were included:    Prone Ts for mid trap fac with AA; 2x12  Prone Y; 2x12  Prone row and ext for scap stabilization and RTC fac  PNF D1 and D2 x15 each  Seated horizontal Abd; 3x5  Seated abd 4x3  Seated ball press out for scap mobility and postural awareness 3x4  Ball press up with IR and postural control 3x3   Sup press up for RTC fac and scap px 1x10  Serratus pucnes 6# for serratus ant fac    therapeutic activities to improve functional performance for -  minutes, including:  -    Patient Education and Home Exercises     Home Exercises Provided and Patient Education Provided     Education provided:   - Continued HEP    Written Home Exercises Provided: yes. Exercises were reviewed and Miriam was able to demonstrate them prior to the end of the session.  Miriam demonstrated good  understanding of the education provided. See EMR under Patient Instructions for exercises provided during therapy sessions    ASSESSMENT     Continued Range of Motion limitations present with all shoulder Range of Motion especially Internal Rotation, External Rotation, and Abduction. Patient continues to demonstrate compensatory patterns when attempting to further push shoulder Range of Motion.   Miriam Is progressing well towards her goals.   Pt prognosis is Good.     Pt will continue to benefit from skilled outpatient physical therapy to address the deficits listed in the problem list box on initial evaluation, provide pt/family education and to maximize pt's level of independence in the home and community environment.     Pt's spiritual, cultural and educational needs considered and pt agreeable to plan of care and goals.     Anticipated barriers to physical therapy: adherence to treatment plan    GOALS:     Short Term  Goals:  4 weeks Progress   5/22/2023   Pain: Pt will demonstrate improved pain by reports of less than or equal to 3/10 worst pain on the verbal rating scale in order to progress toward maximal functional ability and improve QOL. MET   Function: Patient will demonstrate improved function as indicated by a functional limitation score of less than or equal to 45 out of 100 on FOTO. PC   HEP: Patient will demonstrate independence with HEP in order to progress toward functional independence. PC      Long Term Goals:  8 weeks Progress  5/22/2023   Function: Patient will demonstrate improved function as indicated by a functional limitation score of less than or equal to 30 out of 100 on FOTO. PC   Mobility: Patient will improve AROM to stated goals, listed in objective measures above, in order to return to maximal functional potential and improve quality of life. PC   Strength: Patient will improve strength to stated goals, listed in objective measures above, in order to improve functional independence and quality of life. PC   Patient will return to normal ADL's, IADL's, community involvement, recreational activities, and work-related activities without reported pain and maximal function.  PC      Goals Key:  PC= progressing/continue;        PM= partially met;             DC= discontinue    PLAN     Plan of care Certification: 5/22/2023 to 7/30/2023  Outpatient Physical Therapy 2 times weekly for 8 weeks to include any combination of the following interventions: virtual visits, dry needling, modalities, electrical stimulation (IFC, Pre-Mod, Attended with Functional Dry Needling), Aquatic Therapy, Cervical/Lumbar Traction, Manual Therapy, Neuromuscular Re-ed, Patient Education, Self Care, Therapeutic Activites, Therapeutic Exercise, and Ultrasound     Rubi Mcgee, PT

## 2023-06-06 DIAGNOSIS — Z96.612 S/P SHOULDER HEMIARTHROPLASTY, LEFT: Primary | ICD-10-CM

## 2023-06-07 ENCOUNTER — HOSPITAL ENCOUNTER (OUTPATIENT)
Dept: RADIOLOGY | Facility: HOSPITAL | Age: 28
Discharge: HOME | End: 2023-06-07
Attending: STUDENT IN AN ORGANIZED HEALTH CARE EDUCATION/TRAINING PROGRAM
Payer: MEDICAID

## 2023-06-07 ENCOUNTER — OFFICE VISIT (OUTPATIENT)
Dept: SPORTS MEDICINE | Facility: CLINIC | Age: 28
End: 2023-06-07
Payer: MEDICAID

## 2023-06-07 VITALS — HEIGHT: 67 IN | BODY MASS INDEX: 32.18 KG/M2 | WEIGHT: 205 LBS

## 2023-06-07 DIAGNOSIS — Z96.612 S/P SHOULDER HEMIARTHROPLASTY, LEFT: ICD-10-CM

## 2023-06-07 DIAGNOSIS — M75.02 ADHESIVE CAPSULITIS OF LEFT SHOULDER: Primary | ICD-10-CM

## 2023-06-07 PROCEDURE — 3008F BODY MASS INDEX DOCD: CPT | Mod: CPTII,,, | Performed by: STUDENT IN AN ORGANIZED HEALTH CARE EDUCATION/TRAINING PROGRAM

## 2023-06-07 PROCEDURE — 1159F MED LIST DOCD IN RCRD: CPT | Mod: CPTII,,, | Performed by: STUDENT IN AN ORGANIZED HEALTH CARE EDUCATION/TRAINING PROGRAM

## 2023-06-07 PROCEDURE — 73030 X-RAY EXAM OF SHOULDER: CPT | Mod: 26,LT,, | Performed by: RADIOLOGY

## 2023-06-07 PROCEDURE — 73030 X-RAY EXAM OF SHOULDER: CPT | Mod: TC,LT

## 2023-06-07 PROCEDURE — 99212 OFFICE O/P EST SF 10 MIN: CPT | Mod: PBBFAC | Performed by: STUDENT IN AN ORGANIZED HEALTH CARE EDUCATION/TRAINING PROGRAM

## 2023-06-07 PROCEDURE — 99999 PR PBB SHADOW E&M-EST. PATIENT-LVL II: CPT | Mod: PBBFAC,,, | Performed by: STUDENT IN AN ORGANIZED HEALTH CARE EDUCATION/TRAINING PROGRAM

## 2023-06-07 PROCEDURE — 99999 PR PBB SHADOW E&M-EST. PATIENT-LVL II: ICD-10-PCS | Mod: PBBFAC,,, | Performed by: STUDENT IN AN ORGANIZED HEALTH CARE EDUCATION/TRAINING PROGRAM

## 2023-06-07 PROCEDURE — 99214 OFFICE O/P EST MOD 30 MIN: CPT | Mod: S$PBB,,, | Performed by: STUDENT IN AN ORGANIZED HEALTH CARE EDUCATION/TRAINING PROGRAM

## 2023-06-07 PROCEDURE — 3008F PR BODY MASS INDEX (BMI) DOCUMENTED: ICD-10-PCS | Mod: CPTII,,, | Performed by: STUDENT IN AN ORGANIZED HEALTH CARE EDUCATION/TRAINING PROGRAM

## 2023-06-07 PROCEDURE — 1160F PR REVIEW ALL MEDS BY PRESCRIBER/CLIN PHARMACIST DOCUMENTED: ICD-10-PCS | Mod: CPTII,,, | Performed by: STUDENT IN AN ORGANIZED HEALTH CARE EDUCATION/TRAINING PROGRAM

## 2023-06-07 PROCEDURE — 99214 PR OFFICE/OUTPT VISIT, EST, LEVL IV, 30-39 MIN: ICD-10-PCS | Mod: S$PBB,,, | Performed by: STUDENT IN AN ORGANIZED HEALTH CARE EDUCATION/TRAINING PROGRAM

## 2023-06-07 PROCEDURE — 1159F PR MEDICATION LIST DOCUMENTED IN MEDICAL RECORD: ICD-10-PCS | Mod: CPTII,,, | Performed by: STUDENT IN AN ORGANIZED HEALTH CARE EDUCATION/TRAINING PROGRAM

## 2023-06-07 PROCEDURE — 1160F RVW MEDS BY RX/DR IN RCRD: CPT | Mod: CPTII,,, | Performed by: STUDENT IN AN ORGANIZED HEALTH CARE EDUCATION/TRAINING PROGRAM

## 2023-06-07 PROCEDURE — 73030 XR SHOULDER COMPLETE 2 OR MORE VIEWS LEFT: ICD-10-PCS | Mod: 26,LT,, | Performed by: RADIOLOGY

## 2023-06-07 RX ORDER — OXYCODONE AND ACETAMINOPHEN 5; 325 MG/1; MG/1
1 TABLET ORAL EVERY 4 HOURS PRN
Status: ON HOLD | COMMUNITY
End: 2023-07-24 | Stop reason: HOSPADM

## 2023-06-07 NOTE — PROGRESS NOTES
Orthopaedic Follow-Up Visit    Last Appointment: 2/14/23  Diagnosis: S/P left shoulder steven-arthoplasty (6/16/22) with capsular tightness  Prior Procedure: Continue PT    Miriam Jett is a 27 y.o. female who is here for f/u evaluation of her left shoulder. The patient was last seen here by me on 2/14/23 at which point she continued to make progress with her range of motion and function though it had been slow. We had her restart PT to work on ROM and strength, but had no formal restrictions for her. The patient returns today reporting that she has continued to experience stiffness and limited range of motion in her shoulder and is interested in discussing further treatment options.     To review her history, Miriam Jett is a 27 y.o. female who underwent left shoulder steven-arthroplasty on 6/16/22. She has had some continued stiffness/tightness following her surgery.     Patient's medications, allergies, past medical, surgical, social and family histories were reviewed and updated as appropriate.    Review of Systems   All systems reviewed were negative.  Specifically, the patient denies fever, chills, weight loss, chest pain, shortness of breath, or dyspnea on exertion.      Past Medical History:   Diagnosis Date    Miscarriage        Past Surgical History:   Procedure Laterality Date    BONE GRAFT Left 7/13/2021    Procedure: BONE GRAFT;  Surgeon: Spenser Krishnamurthy MD;  Location: [facility] OR;  Service: Orthopedics;  Laterality: Left;  pro-dense    CORE DECOMPRESSION OF HEAD OF FEMUR Left 7/13/2021    Procedure: CORE DECOMPRESSION, FEMUR, HEAD;  Surgeon: Spenser Krishnamurthy MD;  Location: [facility] OR;  Service: Orthopedics;  Laterality: Left;    HIP ARTHROPLASTY Right 10/6/2021    Procedure: ARTHROPLASTY, HIP;  Surgeon: Spenser Krishnamurthy MD;  Location: [facility] OR;  Service: Orthopedics;  Laterality: Right;    INJECTION OF JOINT Right 7/12/2021    Procedure: right IA Hip Joint injection- per   Suraj;  Surgeon: Juan David Ho MD;  [facility];  Service: Pain Management;  Laterality: Right;    PARTIAL ARTHROPLASTY OF SHOULDER Left 6/16/2022    Procedure: HEMIARTHROPLASTY, SHOULDER;  Surgeon: Michael Ramirez MD;  Location: [facility] OR;  Service: Orthopedics;  Laterality: Left;       Patient's Medications   New Prescriptions    No medications on file   Previous Medications    ACETAMINOPHEN (TYLENOL) 500 MG TABLET    Take 2 tablets (1,000 mg total) by mouth every 8 (eight) hours as needed for Pain.    NAPROXEN (NAPROSYN) 500 MG TABLET    Take 1 tablet (500 mg total) by mouth 2 (two) times daily.   Modified Medications    No medications on file   Discontinued Medications    No medications on file       Family History   Problem Relation Age of Onset    Birth defects Neg Hx        Review of patient's allergies indicates:  No Known Allergies      Objective:      Physical Exam  Patient is alert and oriented, no distress. Skin is intact. Neuro is normal with no focal motor or sensory findings.    Cervical exam is unremarkable. Intact cervical ROM. Negative Spurling's test    Physical Exam:  RIGHT    LEFT    Scap Dyskinesis/Winging (-)    (-)    Tenderness:          Greater Tuberosity  (-)    (-)  Bicipital Groove  (-)    (-)  AC joint   (-)    (-)  Other:     ROM:  Forward Elevation 180    100  Abduction  120    70  ER (at side)  80    40  IR   T8    Back pocket    Strength:   Supraspinatus  5/5    5/5  Infraspinatus  5/5    5/5  Subscap / IR  5/5    5/5     Special Tests:   Neer:    (-)    (-)   Jane:   (-)    (-)   SS Stress:   (-)    (-)   Bear Hug:   (-)    (-)   Aspen's:   (-)    (-)   Resisted Thrower's:   (-)    (-)   Cross Arm Abduction:  (-)    (-)    Neurovascular examination  - Motor grossly intact bilaterally to shoulder abduction, elbow flexion and extension, wrist flexion and extension, and intrinsic hand musculature  - Sensation intact to light touch bilaterally in axillary, median,  radial, and ulnar distributions  - Symmetrical radial pulses    Imaging:    XR Results:  X-Ray Shoulder 2 or More Views Left  Narrative: EXAM:  XR SHOULDER COMPLETE 2 OR MORE VIEWS LEFT    CLINICAL HISTORY:   Left shoulder joint pain    TECHNIQUE: 4 views of the left shoulder.    COMPARISON: None.    FINDINGS:    Postoperative changes with left humeral head arthroplasty evident.  No fracture.  Alignment appears normal.  AC joint appears normal.  Impression:  No acute findings.  See above.    Finalized on: 6/7/2023 3:36 PM By:  Moreno Jimenez MD  RG# [id number]      2023-06-07 15:39:[id number]    [facility]         Physician read: I agree with the above impression.    Assessment/Plan:   Miriam Jett is a 27 y.o. female with 1 year s/p left shoulder steven-arthoplasty - continued stiffness     Plan:    She is now almost 1 year out from her left shoulder steven-arthoplasty and reports continued stiffness and limited range of motion which has not greatly improved with considerable physical therapy at this time.   She has no real pain but has significant limitations with ROM that diminish her quality of life.  Due to her continued limitations I recommend moving forward with operative treatment at this time. I recommend left shoulder arthroscopy with lysis of adhesions and manipulation under anesthesia.   She is interested in moving forward with operative management but would like to discuss with her family more before proceeding.   Follow up with me in 2 weeks to further discuss treatment plan moving forward.            Michael Ramirez MD    I, Flakito Sommer, acted as a scribe for Michael Ramirez MD for the duration of this office visit.

## 2023-06-08 ENCOUNTER — PATIENT MESSAGE (OUTPATIENT)
Dept: SPORTS MEDICINE | Facility: CLINIC | Age: 28
End: 2023-06-08
Payer: MEDICAID

## 2023-06-17 NOTE — PROGRESS NOTES
Orthopaedic Follow-Up Visit    Last Appointment: 6/7/23  Diagnosis: S/P left shoulder steven-arthoplasty (6/16/22) with capsular tightness  Prior Procedure: None    Miriam Jett is a 27 y.o. female who is here for f/u evaluation of her left shoulder. The patient was last seen here by me on 6/7/23 at which point she was almost 1 year out from her left shoulder steven-arthoplasty and reported continued stiffness and limited range of motion which had not greatly improved with considerable physical therapy at that time. We discussed that due to these continued limitations I recommend moving forward with operative treatment in the form of left shoulder arthroscopy with lysis of adhesions and manipulation under anesthesia. She wanted to discuss it with her family further before proceeding.      To review her history, Miriam Jett is a 27 y.o. female who underwent left shoulder steven-arthroplasty on 6/16/22. She has had some continued stiffness/tightness following her surgery. We have previously discussed moving forward with operative treatment in the form of left shoulder arthroscopy with lysis of adhesions and manipulation under anesthesia.    Patient's medications, allergies, past medical, surgical, social and family histories were reviewed and updated as appropriate.    Review of Systems   All systems reviewed were negative.  Specifically, the patient denies fever, chills, weight loss, chest pain, shortness of breath, or dyspnea on exertion.      Past Medical History:   Diagnosis Date    Miscarriage        Past Surgical History:   Procedure Laterality Date    BONE GRAFT Left 7/13/2021    Procedure: BONE GRAFT;  Surgeon: Spenser Krishnamurthy MD;  Location: [facility] OR;  Service: Orthopedics;  Laterality: Left;  pro-dense    CORE DECOMPRESSION OF HEAD OF FEMUR Left 7/13/2021    Procedure: CORE DECOMPRESSION, FEMUR, HEAD;  Surgeon: Spenser Krishnamurthy MD;  Location: [facility] OR;  Service: Orthopedics;   Laterality: Left;    HIP ARTHROPLASTY Right 10/6/2021    Procedure: ARTHROPLASTY, HIP;  Surgeon: Spenser Krishnamurthy MD;  Location: [facility] OR;  Service: Orthopedics;  Laterality: Right;    INJECTION OF JOINT Right 7/12/2021    Procedure: right IA Hip Joint injection- per Dr Ruelas;  Surgeon: Juan David Ho MD;  Location: [facility];  Service: Pain Management;  Laterality: Right;    PARTIAL ARTHROPLASTY OF SHOULDER Left 6/16/2022    Procedure: HEMIARTHROPLASTY, SHOULDER;  Surgeon: Michael Ramirez MD;  Location: [facility] OR;  Service: Orthopedics;  Laterality: Left;       Patient's Medications   New Prescriptions    No medications on file   Previous Medications    ACETAMINOPHEN (TYLENOL) 500 MG TABLET    Take 2 tablets (1,000 mg total) by mouth every 8 (eight) hours as needed for Pain.    NAPROXEN (NAPROSYN) 500 MG TABLET    Take 1 tablet (500 mg total) by mouth 2 (two) times daily.    OXYCODONE-ACETAMINOPHEN (PERCOCET) 5-325 MG PER TABLET    Take 1 tablet by mouth every 4 (four) hours as needed for Pain.   Modified Medications    No medications on file   Discontinued Medications    No medications on file       Family History   Problem Relation Age of Onset    Birth defects Neg Hx        Review of patient's allergies indicates:  No Known Allergies      Objective:      Physical Exam  Patient is alert and oriented, no distress. Skin is intact. Neuro is normal with no focal motor or sensory findings.    Cervical exam is unremarkable. Intact cervical ROM. Negative Spurling's test    Physical Exam:  RIGHT    LEFT    Scap Dyskinesis/Winging (-)    (-)    Tenderness:          Greater Tuberosity  (-)    (-)  Bicipital Groove  (-)    (-)  AC joint   (-)    (-)  Other:     ROM:  Forward Elevation 180    100  Abduction  120    70  ER (at side)  80    40  IR   T8    Back pocket    Strength:   Supraspinatus  5/5    5/5  Infraspinatus  5/5    5/5  Subscap / IR  5/5    5/5     Special  Tests:   Neer:    (-)    +   Jane:   (-)    +   SS Stress:   (-)    (-)   Bear Hug:   (-)    (-)   Jack's:   (-)    (-)   Resisted Thrower's:   (-)    (-)   Cross Arm Abduction:  (-)    (-)    Neurovascular examination  - Motor grossly intact bilaterally to shoulder abduction, elbow flexion and extension, wrist flexion and extension, and intrinsic hand musculature  - Sensation intact to light touch bilaterally in axillary, median, radial, and ulnar distributions  - Symmetrical radial pulses    Imaging:    XR Results:  X-Ray Shoulder 2 or More Views Left  Narrative: EXAM:  XR SHOULDER COMPLETE 2 OR MORE VIEWS LEFT    CLINICAL HISTORY:   Left shoulder joint pain    TECHNIQUE: 4 views of the left shoulder.    COMPARISON: None.    FINDINGS:    Postoperative changes with left humeral head arthroplasty evident.  No fracture.  Alignment appears normal.  AC joint appears normal.  Impression:  No acute findings.  See above.    Finalized on: 6/7/2023 3:36 PM By:  Moreno Jimenez MD  [facility]# [id number]      2023-06-07 15:39:[id number]    [facility]         Physician read: I agree with the above impression.    Assessment/Plan:   Miriam Jett is a 27 y.o. female with 1 year s/p left shoulder steven-arthoplasty - continued stiffness     Plan:    She is now almost 1 year out from her left shoulder steven-arthoplasty and reports continued stiffness and limited range of motion which has not greatly improved with considerable physical therapy at this time.   She has no real pain but has significant limitations with ROM that diminish her quality of life.  Due to her continued limitations I recommend moving forward with operative treatment at this time. I recommend left shoulder arthroscopy with lysis of adhesions and manipulation under anesthesia.   We reviewed the proposed procedure in detail, which included discussion of risks and benefits, techniques, and possible complications of the procedure. Risks include infection, bleeding,  damage to artery and nerves, continual pain and possible stiffness, and blood clots. We reviewed the post-operative restrictions, recovery period, and rehabilitation.  All patient questions were answered. Despite the risks, she elected to proceed with surgery and the consent was freely signed.  At least 10 minutes were spent instructing the patient in home care following surgery including, but not limited to: sling use, sleeping, hygiene, post-operative exercises, preventing post-operative complications, etc.  All questions were answered.  This service was performed under the direction of Michael Ramirez MD.  CPT [id number].  Follow up with me 10-14 days after surgery          Michael Ramirez MD

## 2023-06-23 ENCOUNTER — OFFICE VISIT (OUTPATIENT)
Dept: SPORTS MEDICINE | Facility: CLINIC | Age: 28
End: 2023-06-23
Payer: MEDICAID

## 2023-06-23 DIAGNOSIS — Z96.612 S/P SHOULDER HEMIARTHROPLASTY, LEFT: ICD-10-CM

## 2023-06-23 DIAGNOSIS — M75.02 ADHESIVE CAPSULITIS OF LEFT SHOULDER: Primary | ICD-10-CM

## 2023-06-23 PROCEDURE — 99213 OFFICE O/P EST LOW 20 MIN: CPT | Mod: PBBFAC | Performed by: STUDENT IN AN ORGANIZED HEALTH CARE EDUCATION/TRAINING PROGRAM

## 2023-06-23 PROCEDURE — 99999 PR PBB SHADOW E&M-EST. PATIENT-LVL III: ICD-10-PCS | Mod: PBBFAC,,, | Performed by: STUDENT IN AN ORGANIZED HEALTH CARE EDUCATION/TRAINING PROGRAM

## 2023-06-23 PROCEDURE — 99999 PR PBB SHADOW E&M-EST. PATIENT-LVL III: CPT | Mod: PBBFAC,,, | Performed by: STUDENT IN AN ORGANIZED HEALTH CARE EDUCATION/TRAINING PROGRAM

## 2023-06-23 PROCEDURE — 1159F MED LIST DOCD IN RCRD: CPT | Mod: CPTII,,, | Performed by: STUDENT IN AN ORGANIZED HEALTH CARE EDUCATION/TRAINING PROGRAM

## 2023-06-23 PROCEDURE — 1159F PR MEDICATION LIST DOCUMENTED IN MEDICAL RECORD: ICD-10-PCS | Mod: CPTII,,, | Performed by: STUDENT IN AN ORGANIZED HEALTH CARE EDUCATION/TRAINING PROGRAM

## 2023-06-23 PROCEDURE — 97110 THERAPEUTIC EXERCISES: CPT | Mod: PBBFAC | Performed by: STUDENT IN AN ORGANIZED HEALTH CARE EDUCATION/TRAINING PROGRAM

## 2023-06-23 PROCEDURE — 99214 PR OFFICE/OUTPT VISIT, EST, LEVL IV, 30-39 MIN: ICD-10-PCS | Mod: S$PBB,,, | Performed by: STUDENT IN AN ORGANIZED HEALTH CARE EDUCATION/TRAINING PROGRAM

## 2023-06-23 PROCEDURE — 1160F PR REVIEW ALL MEDS BY PRESCRIBER/CLIN PHARMACIST DOCUMENTED: ICD-10-PCS | Mod: CPTII,,, | Performed by: STUDENT IN AN ORGANIZED HEALTH CARE EDUCATION/TRAINING PROGRAM

## 2023-06-23 PROCEDURE — 99214 OFFICE O/P EST MOD 30 MIN: CPT | Mod: S$PBB,,, | Performed by: STUDENT IN AN ORGANIZED HEALTH CARE EDUCATION/TRAINING PROGRAM

## 2023-06-23 PROCEDURE — 1160F RVW MEDS BY RX/DR IN RCRD: CPT | Mod: CPTII,,, | Performed by: STUDENT IN AN ORGANIZED HEALTH CARE EDUCATION/TRAINING PROGRAM

## 2023-06-23 RX ORDER — PREGABALIN 50 MG/1
50 CAPSULE ORAL 2 TIMES DAILY
COMMUNITY

## 2023-06-23 NOTE — PATIENT INSTRUCTIONS
In preparation for you upcoming surgery, here are some things to keep in mind leading up to and after your surgery:    PRE-ADMIT APPOINTMENT  We have a department that will review your chart for any health conditions or other issues to make sure that it is safe from an anesthesia standpoint to undergo surgery.   If they have any concerns they may schedule an appointment for you to be evaluated and have any further testing done. This may include but is not limited to bloodwork, EKG, chest X-ray, referral to cardiologist for additional testing/clearance, referral to pulmonologist for additional testing/clearance, or referral to any other needed specialties for additional testing/clearance.  If only basic testing is needed this appointment may be scheduled a few days before your actually surgery. Unless any new concerns or issues arise, this typically does not affect the date of your surgery.   If you are on any medications, at this appointment they will also review and discuss/provide instructions on when to stop or start taking these medications before and after surgery.   INSTRUCTIONS FOR SURGERY   The day before your surgery (usually between 1-3 PM), someone will call you to give you the scheduled time for you surgery, what time you need to arrive, what time to not eat/drink past, and any other final instructions  If your surgery is scheduled for Monday then they will call you on Friday afternoon.   If you do not receive this call please reach out to our office before the end of the day (4 PM) so that we may assist you.   HOME EXERCISES AFTER SURGERY        PHYSICAL THERAPY  A referral for physical therapy will be placed to the location we discussed today. If you would like to make changes to this, please give us a call or send us a [employer] message as soon as possible so we may coordinate these changes.   We will send that referral to the desired location and also provide them with the rehabilitation protocol that  will be followed to make sure you are progressed appropriately after surgery.   They will also be provided with the start date of your PT after surgery. You will likely start PT prior to you first post-op appointment. Depending on the procedure you have performed this may be as soon as 3 days after surgery or up to 2 weeks after surgery. Below are a few examples of some common time frames for certain procedures:  Rotator cuff surgery- 10-11 days after surgery  Shoulder labrum surgery- 3-5 days after surgery   Shoulder replacement- 3-5 days after surgery  ACL Reconstruction- 3-5 days after surgery  Hip scope- 3-5 days after surgery  Distal biceps tendon repair- once post-op splint is removed/per Dr. Ramirez recommendation  Fracture- once post-op splint is removed/per Dr. Ramirez recommendation   If you ever have any problems or issues with your physical therapy or wish to change locations at any point, please let us know and we are happy to assist with that change.   POST-OP APPOINTMENTS  Post-op appointments will be scheduled at 2 weeks, 6 weeks, and 3 months from the date of your surgery. We will schedule these appointments prior to your surgery and they will be able to be viewed in [employer].  2 week post-op appointment  This appointment will be with Monique Saha who is Dr. Ramirez's physician assistant (PA). At this appointment we will be removing your sutures, checking for any signs of infection or other concerning issues, and checking to make sure your range of motion is appropriate.   Dr. Ramirez will also be in clinic on the same day as this appointment and can step in to see you if there is anything of concern that needs to be addressed.   You may also have X-rays scheduled at this appointment, depending on the procedure you had performed (shoulder replacement, ACL surgery, surgery for a fracture, etc.)  6 week post-op appointment  This appointment will be with Dr. Ramirez. He will make sure  everything is progressing well and may also review your pictures from surgery if any were taken.   You may also have X-rays at this appointment, depending on the procedure you had performed (shoulder replacement, surgery for a fracture, etc.)  3 month post-op appointment  This appointment will be with Dr. Ramirez. He will make sure you continue to progress appropriately.  Any follow-ups after this visit will be at the discretion of Dr. Ramirez based upon your recovery/progress, procedure performed, etc.   FMLA OR SHORT TERM DISABILITY PAPERWORK  If you have any paperwork that needs to be filled out in regards to FMLA leave or short term disability leave, you may drop these forms off at the [place] or attachment them to a patient message via [employer].   These forms will be filled out within a few days of your actual surgery being performed in case your surgery date is rescheduled or changed and the forms would need to potentially be filled out again.   Please try to provide these forms to our office in a timely manner, as we ask for 5-7 business days for them to be completed.

## 2023-07-03 ENCOUNTER — PATIENT MESSAGE (OUTPATIENT)
Dept: SPORTS MEDICINE | Facility: CLINIC | Age: 28
End: 2023-07-03
Payer: MEDICAID

## 2023-07-03 DIAGNOSIS — Z01.818 PREOPERATIVE TESTING: Primary | ICD-10-CM

## 2023-07-12 NOTE — PROGRESS NOTES
Orthopaedics Sports Medicine     Shoulder Initial Visit         5/3/2022    Referring MD: Self, Aaareferral    Chief Complaint   Patient presents with    Left Shoulder - Pain         History of Present Illness:   Miriam Jett is a 26 y.o. right-hand dominant female who presents with LEFT shoulder pain and dysfunction.    Onset of the symptoms was 10/2021     Inciting event: No specific injury, gradual onset of symptoms.     Current symptoms include constant aching, throbbing, sharp, and shooting pains. The shoulder also pops and makes a grinding sound.     Pain is aggravated by night, reaching, and movements.      Evaluation to date: X-Ray     Treatment to date: Rest, activity modification, OTC medication     Past Medical History:   Past Medical History:   Diagnosis Date    Miscarriage        Past Surgical History:   Past Surgical History:   Procedure Laterality Date    BONE GRAFT Left 7/13/2021    Procedure: BONE GRAFT;  Surgeon: Spenser Krishnamurthy MD;  Location: [facility];  Service: Orthopedics;  Laterality: Left;  pro-dense    CORE DECOMPRESSION OF HEAD OF FEMUR Left 7/13/2021    Procedure: CORE DECOMPRESSION, FEMUR, HEAD;  Surgeon: Spenser Krishnamurtyh MD;  Location: [facility] OR;  Service: Orthopedics;  Laterality: Left;    HIP ARTHROPLASTY Right 10/6/2021    Procedure: ARTHROPLASTY, HIP;  Surgeon: Spenser Krishnamurthy MD;  Location: [facility];  Service: Orthopedics;  Laterality: Right;    INJECTION OF JOINT Right 7/12/2021    Procedure: right IA Hip Joint injection- per Dr Ruelas;  Surgeon: Juan David Ho MD;  Location: [facility];  Service: Pain Management;  Laterality: Right;       Medications:  Patient's Medications   New Prescriptions    No medications on file   Previous Medications    DICLOFENAC SODIUM (VOLTAREN) 1 % GEL    Apply 2 g topically 3 (three) times daily.    GABAPENTIN (NEURONTIN) 300 MG CAPSULE    Take 1 capsule (300 mg total) by mouth 2 (two) times daily.    MEGESTROL (MEGACE ES)  Subjective   Johnna Contreras is a 65 y.o. female.     Chief Complaint   Patient presents with   • Follow-up     HTN, smoking       History of Present Illness     Johnna Contreras presents today for   Chief Complaint   Patient presents with   • Follow-up     HTN, smoking     She is here for 3 month follow up on HTN and smoking. She has been on Chantix, we had started patches in August with plan to Reduce smoking to 1/4 ppd starting Friday 9/4/20. She had Plans to be smoke free by October 1. She had lung CA screening CT low dose on 9/10/20. She is at about 1/2ppd.    You have chosen to receive care through a telehealth visit.  Do you consent to use a video/audio connection for your medical care today? Yes    The following portions of the patient's history were reviewed and updated as appropriate: allergies, current medications, past family history, past medical history, past social history, past surgical history and problem list.    Active Ambulatory Problems     Diagnosis Date Noted   • Hyperlipidemia 11/12/2019   • Hypertension 11/12/2019   • Stroke (CMS/HCC) 11/12/2019   • Personal history of tobacco use, presenting hazards to health 12/09/2020   • Solid nodule of lung 6 mm to 8 mm in diameter 12/09/2020     Resolved Ambulatory Problems     Diagnosis Date Noted   • No Resolved Ambulatory Problems     Past Medical History:   Diagnosis Date   • GERD (gastroesophageal reflux disease)    • Headache      Past Surgical History:   Procedure Laterality Date   • DILATATION AND CURETTAGE     • EYE SURGERY     • TONSILLECTOMY     • TUBAL ABDOMINAL LIGATION       Family History   Problem Relation Age of Onset   • Heart attack Mother    • Stroke Mother    • Hypertension Mother    • Hyperlipidemia Mother    • Diabetes Father    • Cancer Paternal Grandmother    • Stroke Paternal Grandmother    • Heart attack Paternal Grandmother      Social History     Socioeconomic History   • Marital status:      Spouse name: Not on file    • Number of children: Not on file   • Years of education: Not on file   • Highest education level: Not on file   Tobacco Use   • Smoking status: Current Every Day Smoker     Packs/day: 0.50   • Smokeless tobacco: Never Used   Substance and Sexual Activity   • Alcohol use: Yes     Frequency: Never     Comment: rarely    • Drug use: No       Review of Systems  Review of Systems -  General ROS: negative for - chills, fever or night sweats  Cardiovascular ROS: baseline SWANSON 2/2 smoking  Gastrointestinal ROS: no abdominal pain, change in bowel habits, or black or bloody stools  Genito-Urinary ROS: no dysuria, trouble voiding, or hematuria    Objective   There were no vitals taken for this visit.  Vitals obtained from patient if available  Physical Exam  Const: Non-toxic appearing, NAD, A&Ox4, Pleasant, Cooperative  Eyes: EOMI, no conjunctivitis  ENT: No copious nasal drainage noted. Voice extremely raspy  Cardiac: Regular rate by pulse  Resp: Respiratory rate observed to be within normal limits, no increased work of breathing observed, no audible wheezing or cough noted  Psych: Appropriate mood and behavior.  Procedures  Assessment/Plan   Problem List Items Addressed This Visit        Cardiovascular and Mediastinum    Hypertension - Primary    Overview     Amlodipine 5mg daily, lisinopril-HCTZ 20-12.5mg daily            Respiratory    Solid nodule of lung 6 mm to 8 mm in diameter    Overview     Completed CT low dose 9/10/20, small 7 mm noncalcified pleural-based nodule identified within the inferior aspect of the right upper lobe. Six-month follow-up is recommended for stability.         Relevant Orders    CT Chest Without Contrast       Other    Personal history of tobacco use, presenting hazards to health    Overview     Chantix, patches (OTC only per insurance).  Completed CT low dose 9/10/20, small 7 mm noncalcified pleural-based nodule identified within the inferior aspect of the right upper lobe. Six-month  "625 MG/5 ML (125 MG/ML) SUSP    daily as needed.     MELOXICAM (MOBIC) 15 MG TABLET    Take 1 tablet (15 mg total) by mouth once daily.    ONDANSETRON (ZOFRAN-ODT) 4 MG TBDL    Take 2 tablets (8 mg total) by mouth every 6 (six) hours as needed.    ONDANSETRON (ZOFRAN-ODT) 4 MG TBDL    Take 2 tablets (8 mg total) by mouth every 6 (six) hours as needed (Nausea).    OXYCODONE-ACETAMINOPHEN (PERCOCET)  MG PER TABLET    Take 1 tablet by mouth every 8 (eight) hours as needed for Pain.   Modified Medications    No medications on file   Discontinued Medications    No medications on file       Allergies: Review of patient's allergies indicates:  No Known Allergies    Social History:   Home town: [place], [place]  Occupation: [employer]  Alcohol use: She reports no history of alcohol use.  Tobacco use: She reports that she has never smoked. She has never used smokeless tobacco.    Review of systems:  History of recent illness, fevers, shakes, or chills: no  History of cardiac problems or chest pain: no  History of pulmonary problems or asthma: no  History of diabetes: no  History of prior dvt or clotting problems: no  History of sleep apnea: no      Physical Examination:  Estimated body mass index is 26.31 kg/m² as calculated from the following:    Height as of this encounter: 5' 7" (1.702 m).    Weight as of this encounter: 76.2 kg (168 lb).    General  Healthy appearing female in no acute distress  Alert and oriented, normal mood, appropriate affect    Shoulder Examination:  Patient is alert and oriented, no distress. Skin is intact. Neuro is normal with no focal motor or sensory findings.    Cervical exam is unremarkable. Intact cervical ROM. Negative Spurling's test    Physical Exam:  RIGHT    LEFT    Scap Dyskinesis/Winging (-)    (-)    Tenderness:          Greater Tuberosity             (-)    (-)  Bicipital Groove  (-)    (-)  AC joint   (-)    (-)  Other:     ROM:  Forward " Elevation 180***    180***  Abduction  120***    120***  ER (at side)  80***    80***  IR   T8***    T8***    Strength:   Supraspinatus  5/5    5/5  Infraspinatus  5/5    5/5  Subscap / IR  5/5    5/5     Special Tests:   Apprehension:   (-)    (-)   Nusrat Relocation:  (-)    (-)   Jerk / Posterior Load:  (-)    (-)   Neer:    (-)    (-)   Jane:   (-)    (-)   SS Stress:   (-)    (-)   Bear Hug:   (-)    (-)   Laramie's:   (-)    (-)   Resisted Thrower's:   (-)    (-)   Cross Arm Abduction:  (-)    (-)    Neurovascular examination  - Motor grossly intact bilaterally to shoulder abduction, elbow flexion and extension, wrist flexion and extension, and intrinsic hand musculature  - Sensation intact to light touch bilaterally in axillary, median, radial, and ulnar distributions  - Symmetrical radial pulses      Imaging:  X-Ray Hips Bilateral 2 View Incl AP Pelvis  Narrative: EXAMINATION:  XR HIPS BILATERAL 2 VIEW INCL AP PELVIS    CLINICAL HISTORY:  Idiopathic aseptic necrosis of left femur    TECHNIQUE:  AP view of the pelvis and frogleg lateral views of both hips were performed.    COMPARISON:  Prior radiographs    FINDINGS:  Right hip total arthroplasty findings are stable.  No acute osseous abnormality.  Left hip demonstrates similar appearance with postoperative decompression and osteonecrosis changes.  No interval femoral head collapse or volume loss.  No acute abnormality.  Impression: Similar appearance of the pelvis/hips.    Electronically signed by: Saul Rose MD  Date:    03/24/2022  Time:    09:21       Physician Read: I agree with the above impression.***      Impression:  26 y.o. female with ***      Plan:  1. Discussed diagnosis and treatment options with patient today. ***  2. Follow up ***           Michael Ramirez MD     follow-up is recommended for stability.         Relevant Orders    CT Chest Without Contrast      Other Visit Diagnoses     Colon cancer screening        Relevant Orders    Ambulatory Referral For Screening Colonoscopy    Viral warts, unspecified type        Relevant Medications    salicylic acid-lactic acid 17 % external solution          See patient diagnoses and orders along with patient instructions for assessment, plan, and changes to care for patient.    This visit was conducted via telemedicine with live video and audio provided through Video Options: D[facility] at the point of care.    Patient Instructions   1.  Have colonoscopy and mammogram completed    2.  Have pneumonia shot at local pharmacy    3.  Cutaneous Wart Care:  The best approach for non-genital cutaneous warts includes the use of 17% salicylic acid solution, available over-the-counter in either liquid or gel preparation. Soak the wart area in warm water with Epsom salt for 5-10 minutes, then filed down the thick skin covering the lesion with a pumice stone or emery port. Do not use fingernail clippers on the wart. Apply salicylic acid to the wart, cover with a small piece of duct tape. Repeat daily. If using a salicylic acid patch, repeat above procedure every other day. Mild redness in the treatment area is common, however he should discontinue treatment if you have severe spreading redness, pain, or severe itching.    Cutaneous warts can take up to 12 weeks to clear. If the wart persists past 12 weeks of treatment, stop treatment and make an appointment with your physician.      No follow-ups on file.    Ambulatory progress note signed and attested to by Ras Padilla D.O.               General

## 2023-07-17 ENCOUNTER — TELEPHONE (OUTPATIENT)
Dept: PREADMISSION TESTING | Facility: HOSPITAL | Age: 28
End: 2023-07-17
Payer: MEDICAID

## 2023-07-17 ENCOUNTER — LAB VISIT (OUTPATIENT)
Dept: LAB | Facility: HOSPITAL | Age: 28
End: 2023-07-17
Attending: STUDENT IN AN ORGANIZED HEALTH CARE EDUCATION/TRAINING PROGRAM
Payer: MEDICAID

## 2023-07-17 DIAGNOSIS — Z01.818 PREOPERATIVE TESTING: ICD-10-CM

## 2023-07-17 LAB
ALBUMIN SERPL BCP-MCNC: 3.8 G/DL (ref 3.5–5.2)
ALP SERPL-CCNC: 68 U/L (ref 55–135)
ALT SERPL W/O P-5'-P-CCNC: 10 U/L (ref 10–44)
ANION GAP SERPL CALC-SCNC: 6 MMOL/L (ref 8–16)
AST SERPL-CCNC: 14 U/L (ref 10–40)
BASOPHILS # BLD AUTO: 0.05 K/UL (ref 0–0.2)
BASOPHILS NFR BLD: 0.7 % (ref 0–1.9)
BILIRUB SERPL-MCNC: 0.3 MG/DL (ref 0.1–1)
BUN SERPL-MCNC: 11 MG/DL (ref 6–20)
CALCIUM SERPL-MCNC: 8.9 MG/DL (ref 8.7–10.5)
CHLORIDE SERPL-SCNC: 105 MMOL/L (ref 95–110)
CO2 SERPL-SCNC: 27 MMOL/L (ref 23–29)
CREAT SERPL-MCNC: 0.7 MG/DL (ref 0.5–1.4)
DIFFERENTIAL METHOD: ABNORMAL
EOSINOPHIL # BLD AUTO: 0.1 K/UL (ref 0–0.5)
EOSINOPHIL NFR BLD: 2 % (ref 0–8)
ERYTHROCYTE [DISTWIDTH] IN BLOOD BY AUTOMATED COUNT: 13.2 % (ref 11.5–14.5)
EST. GFR  (NO RACE VARIABLE): >60 ML/MIN/1.73 M^2
GLUCOSE SERPL-MCNC: 94 MG/DL (ref 70–110)
HCT VFR BLD AUTO: 34.9 % (ref 37–48.5)
HGB BLD-MCNC: 11.5 G/DL (ref 12–16)
IMM GRANULOCYTES # BLD AUTO: 0.01 K/UL (ref 0–0.04)
IMM GRANULOCYTES NFR BLD AUTO: 0.1 % (ref 0–0.5)
LYMPHOCYTES # BLD AUTO: 2.4 K/UL (ref 1–4.8)
LYMPHOCYTES NFR BLD: 33.2 % (ref 18–48)
MCH RBC QN AUTO: 30.3 PG (ref 27–31)
MCHC RBC AUTO-ENTMCNC: 33 G/DL (ref 32–36)
MCV RBC AUTO: 92 FL (ref 82–98)
MONOCYTES # BLD AUTO: 0.5 K/UL (ref 0.3–1)
MONOCYTES NFR BLD: 6.6 % (ref 4–15)
NEUTROPHILS # BLD AUTO: 4.1 K/UL (ref 1.8–7.7)
NEUTROPHILS NFR BLD: 57.4 % (ref 38–73)
NRBC BLD-RTO: 0 /100 WBC
PLATELET # BLD AUTO: 265 K/UL (ref 150–450)
PMV BLD AUTO: 11.2 FL (ref 9.2–12.9)
POTASSIUM SERPL-SCNC: 4.3 MMOL/L (ref 3.5–5.1)
PROT SERPL-MCNC: 6.9 G/DL (ref 6–8.4)
RBC # BLD AUTO: 3.8 M/UL (ref 4–5.4)
SODIUM SERPL-SCNC: 138 MMOL/L (ref 136–145)
WBC # BLD AUTO: 7.08 K/UL (ref 3.9–12.7)

## 2023-07-17 PROCEDURE — 36415 COLL VENOUS BLD VENIPUNCTURE: CPT | Performed by: STUDENT IN AN ORGANIZED HEALTH CARE EDUCATION/TRAINING PROGRAM

## 2023-07-17 PROCEDURE — 80053 COMPREHEN METABOLIC PANEL: CPT | Performed by: STUDENT IN AN ORGANIZED HEALTH CARE EDUCATION/TRAINING PROGRAM

## 2023-07-17 PROCEDURE — 85025 COMPLETE CBC W/AUTO DIFF WBC: CPT | Performed by: STUDENT IN AN ORGANIZED HEALTH CARE EDUCATION/TRAINING PROGRAM

## 2023-07-17 NOTE — TELEPHONE ENCOUNTER
Pre op instructions reviewed with patient per phone.      To confirm, your doctor has instructed you: Surgery is scheduled for 7/24/2023.     Pre admit office will call the afternoon prior to surgery between 1PM and 3PM with arrival time.    Surgery will be at [facility],  The address is [address]. [place] [id number].      IMPORTANT INSTRUCTIONS!    Do not eat or drink after 12 midnight, including water. Do not smoke or use chewing tobacco after 12 midnight  OK to brush teeth, but no gum, candy, or mints!      *Take only these medicines with a small swallow of water-morning of surgery*     none       ____ Stop Aspirin, Ibuprofen, Motrin and Aleve at least 5-7 days before surgery, unless otherwise instructed by your doctor, or the nurse.   You MAY use Tylenol/acetaminophen until day of surgery.      ____  If you take diabetic medication, do NOT take morning of surgery unless instructed by Doctor. Metformin must be stopped 24 hrs prior to surgery time.       ____ Stop taking any Fish Oil supplements or Vitamins at least 5 days prior to surgery, unless instructed otherwise by your Doctor.       Please notify MD office if you have an active infection, currently taking antibiotics or received a vaccination within the past 7 days.    You may be required to provide a urine sample prior to procedure;   Please ask  for a specimen cup if you need to use the restroom prior to being called into pre-op.    Bathing Instructions: The night before surgery and the morning prior to coming to the hospital:    - Shower & rinse your body as usual with anti-bacterial Soap (Dial or Lever 2000)   -Hibiclens (if indicated) use AFTER anti-bacterial soap; 1 packet PM/1 packet in AM on surgical site only   -Do not use hibiclens on your head, face, or genitals.    -Do not wash with anti-bacterial soap after you use the hibiclens.    -Do not shave surgical site 5-7 days prior to surgery.    -Pubic hair 7 days  prior to surgery (gyn pt's).      Pediatric patients do not need to use anti-bacterial soap or Hibiclens.             After Bathing:   __ No powder, lotions, creams, or body spray to skin     __No deodorant for any breast procedure, PORT, or upper arm surgery     __ No makeup, mascara, nail polish or artificial nails       **SURGERY WILL BE CANCELLED IF ARTIFICIAL/NAIL POLISH IS PRESENT!!!**    __ Please remove all piercings and leave all jewelry at home.    **SURGERY WILL BE CANCELLED IF PIERCINGS ARE PRESENT!!!**      __ Dentures, Hearing Aids and Contact Lens need to be removed prior to the start of surgery.      __ Wear clean, loose-fitting clothing. Allow for dressings/bandages/surgical equipment     __ You must have transportation, and they MUST stay the entire time.         [facility] Visitor/Ride Policy:   Only 1 adult allowed (over the age of 18) to accompany you and MUST STAY through the entire length of admission.     -Must have a ride home from a responsible adult that you know and trust.    -Medical Transport, Uber or Lyft can only be used if patient has a responsible adult to accompany them during ride home.  Pediatric patients are encouraged to have 2 adults accompany them to the surgery center.     ~Your ride MUST STAY the entire time until you are discharged~        Post-Op Instructions: You will receive surgery post-op instructions by your Discharge Nurse prior to going home.     Surgical Site Infection:   Prevention of surgical site infections:   -Keep incisions clean and dry.   -Do not soak/submerge incisions in water until completely healed.   -Do not apply lotions, powders, creams, or deodorants to site.   -Always make sure hands are cleaned with antibacterial soap/ alcohol-based  prior to touching the surgical site.       Signs and symptoms:               -Redness and pain around the area where you had surgery               -Drainage of cloudy fluid from your surgical wound                -Fever over 100.4 or chills     >>>Call Surgeon office/on-call Surgeon if you experience any of these signs & symptoms post-surgery @ [phone].       *Please Call [facility] Pre-Admit Department for surgery instruction questions:  [phone]    *Payment questions:  [phone]    *Billing questions:  [phone]

## 2023-07-21 ENCOUNTER — TELEPHONE (OUTPATIENT)
Dept: SPORTS MEDICINE | Facility: CLINIC | Age: 28
End: 2023-07-21
Payer: MEDICAID

## 2023-07-21 ENCOUNTER — ANESTHESIA EVENT (OUTPATIENT)
Dept: SURGERY | Facility: HOSPITAL | Age: 28
End: 2023-07-21
Payer: MEDICAID

## 2023-07-21 NOTE — TELEPHONE ENCOUNTER
----- Message from Dionicio Milligan sent at 7/21/2023  2:26 PM CDT -----  Contact: Miriam  .Type:  Patient Returning Call    Who Called:Miriam  Who Left Message for Patient:Nurse  Does the patient know what this is regarding?:Yes  Would the patient rather a call back or a response via [facility]? Call back   Best Call Back Number:[phone]  Additional Information: NA                          Thanks  KT     [place] ambulatory encounter  URGENT CARE OFFICE VISIT  Chief Complaint   Patient presents with   â¢ Sore Throat     was seen last week and swabbed for strep which was negative. â¢ Fever     noticed this afternoon. Exposure to child with flu and strep on 2/2/18    â¢ Cough     chest congestion       SUBJECTIVE:  Sammi Mcdaniel is a 46year old female who presented requesting evaluation for worsening cough and fever. Was ill with sore throat last week, negative strep. Got somewhat better, then worse. Fever to 101 today. Cough, tired. Works from home. Exposed to flu, strep. Review of systems:    A review of systems was performed and findings relevant to these symptoms are included in the HPI. PAST HISTORIES:    ALLERGIES:   Allergen Reactions   â¢ Vicodin [Hydrocodone-Acetaminophen] NAUSEA   â¢ Gluten Meal GI UPSET   â¢ Seasonal Other (See Comments)       MEDICATIONS:  Current Outpatient Prescriptions   Medication Sig   â¢ cyclobenzaprine (FLEXERIL) 10 MG tablet Take 1 tablet by mouth 3 times daily as needed for Muscle spasms. â¢ citalopram (CELEXA) 40 MG tablet TAKE 1 TABLET BY MOUTH DAILY. â¢ Pantoprazole Sodium (PROTONIX PO)    â¢ APRI 0.15-30 MG-MCG per tablet TAKE 1 TABLET BY MOUTH DAILY. â¢ triamcinolone (ARISTOCORT) 0.1 % cream APPLY TO AFFECTED AREA TID AS NEEDED. Dispense 30 grams   â¢ montelukast (SINGULAIR) 10 MG tablet Take 1 tablet by mouth nightly. â¢ DISPENSE Spray 30 mLs in each nostril 3 times daily. Kevon's solution, Gentamycin/Normal saline. Please dispense syringe. â¢ Cholecalciferol (VITAMIN D3) 80723 UNITS Cap Take 50,000 Units by mouth three times a week. â¢ loratadine (CLARITIN) 10 MG tablet Take 10 mg by mouth daily. â¢ DRYSOL 20 % external solution APPLY UNDER BOTH ARMS AT BEDTIME, REMOVE NEXT MORNING FOR 3 DAYS, THEN APPLY TWICE A WEEK FOR MAINTENANCE.   â¢ fluticasone (FLONASE) 50 MCG/ACT nasal spray Spray 1 spray in each nostril daily.    â¢ guaiFENesin-codeine "(GUAIFENESIN AC) 100-10 MG/5ML liquid Take 5 mLs by mouth 4 times daily as needed for Cough. â¢ dexamethasone (DECADRON) 4 MG tablet 2 tabs tonight then 1 tab with breakfast for next 5 days   â¢ albuterol 108 (90 Base) MCG/ACT inhaler Inhale 2 puffs into the lungs every 4 hours as needed (tight cough). â¢ pantoprazole (PROTONIX) 40 MG tablet Take 1 tablet by mouth daily. No current facility-administered medications for this visit. I have reviewed the past medical history, family history, social history, medications and allergies listed in the medical record as obtained by my nursing staff and support staff and agree with their documentation    OBJECTIVE:  Physical Exam:    Visit Vitals  /84   Pulse 118   Temp 100.1 Â°F (37.8 Â°C) (Temporal Artery)   Resp 16   Ht 5' 5.5"" (1.664 m)   Wt 83.9 kg   LMP 01/26/2018 (Exact Date)   SpO2 96%   BMI 30.31 kg/mÂ²        CONSTITUTIONAL: Well-hydrated, well-nourished female who appears acutely miserable, tight cough. Awake, alert and cooperative. HEENT: TMs are clear bilaterally. External ears without deformities. Hearing is grossly normal. Nose without deformities. There is moist nasal mucosa. Throat is clear with moist mucous membranes with post nasal drip, no sinus tenderness, conjunctiva normal.  NECK: No lymphadenopathy or thyroid enlargement. There is full range of motion. There is no tenderness noted. LUNGS: bronchial breath sounds and wheeze throughout   CARDIOVASCULAR: Regular rate and rhythm with normal S1 and S2. There are no murmurs auscultated. SKIN: Warm, dry, intact without rash or lesion. NEUROLOGIC: Alert and oriented x3. PSYCHIATRIC:  Speech and behavior appropriate. Normal mood and affect. DIAGNOSTICS:  CXR: no acute disease    URGENT CARE COURSE:  After Duoneb, sat changed from 98% to 96, lungs sound looser with more effective cough, and patient feels a little better. Fever started coming     Assessment:  1.  Influenza-like illness  " 2. Exacerbation of asthma, unspecified asthma severity, unspecified whether persistent        PLAN:  Orders Placed This Encounter   â¢ Nebulizer, initial tx  [[id number]]   â¢ Pulse Oximetry Singe   â¢ XR Chest PA and Lateral   â¢ albuterol-ipratropium 2.5 mg/0.5 mg (DUONEB) nebulizer solution 3 mL   â¢ ibuprofen (MOTRIN) tablet 400 mg   â¢ guaiFENesin-codeine (GUAIFENESIN AC) 100-10 MG/5ML liquid   â¢ dexamethasone (DECADRON) 4 MG tablet   â¢ albuterol 108 (90 Base) MCG/ACT inhaler       Initially treated for just asthmatic bronchitis. However, after I realized the fever just started - I left a message on phone, Dara Bain called into pharmacy 2/7/18 at 8 am.    FOLLOW-UP:  With PCP if not improving as expected over the next week. Declines work note, understands will likely be flattened for up to a week. PATIENT INSTRUCTIONS:  See handout    The patient was advised to follow up with primary physician or to recheck with the urgent care clinic sooner if symptoms get worse or if new symptoms appear. The patient and  indicated understanding of the diagnosis and agreed with the plan of care.

## 2023-07-21 NOTE — ANESTHESIA PREPROCEDURE EVALUATION
07/21/2023  Miriam Jett is a 27 y.o., female.      Pre-op Assessment    I have reviewed the Patient Summary Reports.     I have reviewed the Nursing Notes. I have reviewed the NPO Status.   I have reviewed the Medications.     Review of Systems  Anesthesia Hx:  No problems with previous Anesthesia  Denies Family Hx of Anesthesia complications.   Denies Personal Hx of Anesthesia complications.   Social:  Non-Smoker    Hematology/Oncology:  Hematology Normal        Cardiovascular:  Cardiovascular Normal     Pulmonary:  Pulmonary Normal    Renal/:  Renal/ Normal     Hepatic/GI:  Hepatic/GI Normal    Musculoskeletal:   Avascular necrosis bilat hips, s/p THR.   Avascular necrosis bilat shoulder, l greater.   Neurological:  Neurology Normal    Psych:  Psychiatric Normal           Physical Exam  General: Alert    Airway:  Mallampati: II   Mouth Opening: Normal  TM Distance: Normal  Tongue: Normal  Neck ROM: Normal ROM    Dental:  Intact, Braces    Chest/Lungs:  Clear to auscultation, Normal Respiratory Rate    Heart:  Rate: Normal  Rhythm: Regular Rhythm        Anesthesia Plan  Type of Anesthesia, risks & benefits discussed:    Anesthesia Type: Gen ETT  Intra-op Monitoring Plan: Standard ASA Monitors  Post Op Pain Control Plan: multimodal analgesia, IV/PO Opioids PRN and peripheral nerve block  Induction:  IV  Informed Consent: Informed consent signed with the Patient and all parties understand the risks and agree with anesthesia plan.  All questions answered.   ASA Score: 2  Day of Surgery Review of History & Physical: H&P Update referred to the surgeon/provider.    Ready For Surgery From Anesthesia Perspective.     .

## 2023-07-21 NOTE — TELEPHONE ENCOUNTER
Returned patient's call and confirmed patient's arrival time of 7am for surgery. Patient stated she spoke to a member of our pre-admit team for surgery as well. Patient verbalized understanding and was grateful for the call back.

## 2023-07-24 ENCOUNTER — ANESTHESIA (OUTPATIENT)
Dept: SURGERY | Facility: HOSPITAL | Age: 28
End: 2023-07-24
Payer: MEDICAID

## 2023-07-24 ENCOUNTER — HOSPITAL ENCOUNTER (OUTPATIENT)
Facility: HOSPITAL | Age: 28
Discharge: HOME | End: 2023-07-24
Attending: STUDENT IN AN ORGANIZED HEALTH CARE EDUCATION/TRAINING PROGRAM | Admitting: STUDENT IN AN ORGANIZED HEALTH CARE EDUCATION/TRAINING PROGRAM
Payer: MEDICAID

## 2023-07-24 VITALS
DIASTOLIC BLOOD PRESSURE: 75 MMHG | TEMPERATURE: 98 F | RESPIRATION RATE: 24 BRPM | OXYGEN SATURATION: 100 % | BODY MASS INDEX: 26.76 KG/M2 | SYSTOLIC BLOOD PRESSURE: 117 MMHG | HEART RATE: 60 BPM | HEIGHT: 67 IN | WEIGHT: 170.5 LBS

## 2023-07-24 DIAGNOSIS — M75.02 ADHESIVE CAPSULITIS OF LEFT SHOULDER: Primary | ICD-10-CM

## 2023-07-24 DIAGNOSIS — M75.00 ADHESIVE CAPSULITIS: ICD-10-CM

## 2023-07-24 LAB
B-HCG UR QL: NEGATIVE
CTP QC/QA: YES

## 2023-07-24 PROCEDURE — 64415 NJX AA&/STRD BRCH PLXS IMG: CPT | Performed by: ANESTHESIOLOGY

## 2023-07-24 PROCEDURE — D9220A PRA ANESTHESIA: ICD-10-PCS | Mod: ANES,,, | Performed by: ANESTHESIOLOGY

## 2023-07-24 PROCEDURE — 36000710: Performed by: STUDENT IN AN ORGANIZED HEALTH CARE EDUCATION/TRAINING PROGRAM

## 2023-07-24 PROCEDURE — 63600175 PHARM REV CODE 636 W HCPCS: Performed by: STUDENT IN AN ORGANIZED HEALTH CARE EDUCATION/TRAINING PROGRAM

## 2023-07-24 PROCEDURE — 71000015 HC POSTOP RECOV 1ST HR: Performed by: STUDENT IN AN ORGANIZED HEALTH CARE EDUCATION/TRAINING PROGRAM

## 2023-07-24 PROCEDURE — 25000003 PHARM REV CODE 250: Performed by: ANESTHESIOLOGY

## 2023-07-24 PROCEDURE — 36000711: Performed by: STUDENT IN AN ORGANIZED HEALTH CARE EDUCATION/TRAINING PROGRAM

## 2023-07-24 PROCEDURE — D9220A PRA ANESTHESIA: Mod: CRNA,,, | Performed by: NURSE ANESTHETIST, CERTIFIED REGISTERED

## 2023-07-24 PROCEDURE — 64450 NJX AA&/STRD OTHER PN/BRANCH: CPT | Performed by: STUDENT IN AN ORGANIZED HEALTH CARE EDUCATION/TRAINING PROGRAM

## 2023-07-24 PROCEDURE — 63600175 PHARM REV CODE 636 W HCPCS: Performed by: ANESTHESIOLOGY

## 2023-07-24 PROCEDURE — D9220A PRA ANESTHESIA: Mod: ANES,,, | Performed by: ANESTHESIOLOGY

## 2023-07-24 PROCEDURE — 27201423 OPTIME MED/SURG SUP & DEVICES STERILE SUPPLY: Performed by: STUDENT IN AN ORGANIZED HEALTH CARE EDUCATION/TRAINING PROGRAM

## 2023-07-24 PROCEDURE — 71000033 HC RECOVERY, INTIAL HOUR: Performed by: STUDENT IN AN ORGANIZED HEALTH CARE EDUCATION/TRAINING PROGRAM

## 2023-07-24 PROCEDURE — D9220A PRA ANESTHESIA: ICD-10-PCS | Mod: CRNA,,, | Performed by: NURSE ANESTHETIST, CERTIFIED REGISTERED

## 2023-07-24 PROCEDURE — 37000008 HC ANESTHESIA 1ST 15 MINUTES: Performed by: STUDENT IN AN ORGANIZED HEALTH CARE EDUCATION/TRAINING PROGRAM

## 2023-07-24 PROCEDURE — 37000009 HC ANESTHESIA EA ADD 15 MINS: Performed by: STUDENT IN AN ORGANIZED HEALTH CARE EDUCATION/TRAINING PROGRAM

## 2023-07-24 PROCEDURE — 63600175 PHARM REV CODE 636 W HCPCS: Performed by: NURSE ANESTHETIST, CERTIFIED REGISTERED

## 2023-07-24 PROCEDURE — 25000003 PHARM REV CODE 250: Performed by: NURSE ANESTHETIST, CERTIFIED REGISTERED

## 2023-07-24 PROCEDURE — 29825 SHO ARTHRS SRG LSS&RESCJ ADS: CPT | Mod: LT,,, | Performed by: STUDENT IN AN ORGANIZED HEALTH CARE EDUCATION/TRAINING PROGRAM

## 2023-07-24 PROCEDURE — 81025 URINE PREGNANCY TEST: CPT | Performed by: STUDENT IN AN ORGANIZED HEALTH CARE EDUCATION/TRAINING PROGRAM

## 2023-07-24 PROCEDURE — 29825 PR SHLDR ARTHROSCOP,LYSE ADHESNS: ICD-10-PCS | Mod: LT,,, | Performed by: STUDENT IN AN ORGANIZED HEALTH CARE EDUCATION/TRAINING PROGRAM

## 2023-07-24 RX ORDER — LIDOCAINE HYDROCHLORIDE 20 MG/ML
INJECTION, SOLUTION EPIDURAL; INFILTRATION; INTRACAUDAL; PERINEURAL
Status: COMPLETED | OUTPATIENT
Start: 2023-07-24 | End: 2023-07-24

## 2023-07-24 RX ORDER — ONDANSETRON 2 MG/ML
INJECTION INTRAMUSCULAR; INTRAVENOUS
Status: DISCONTINUED | OUTPATIENT
Start: 2023-07-24 | End: 2023-07-24

## 2023-07-24 RX ORDER — EPINEPHRINE 1 MG/ML
INJECTION, SOLUTION, CONCENTRATE INTRAVENOUS
Status: DISCONTINUED
Start: 2023-07-24 | End: 2023-07-24 | Stop reason: HOSPADM

## 2023-07-24 RX ORDER — MIDAZOLAM HYDROCHLORIDE 1 MG/ML
INJECTION, SOLUTION INTRAMUSCULAR; INTRAVENOUS
Status: DISCONTINUED | OUTPATIENT
Start: 2023-07-24 | End: 2023-07-24

## 2023-07-24 RX ORDER — FENTANYL CITRATE 50 UG/ML
INJECTION, SOLUTION INTRAMUSCULAR; INTRAVENOUS
Status: DISCONTINUED | OUTPATIENT
Start: 2023-07-24 | End: 2023-07-24

## 2023-07-24 RX ORDER — DEXAMETHASONE SODIUM PHOSPHATE 4 MG/ML
INJECTION, SOLUTION INTRA-ARTICULAR; INTRALESIONAL; INTRAMUSCULAR; INTRAVENOUS; SOFT TISSUE
Status: DISCONTINUED | OUTPATIENT
Start: 2023-07-24 | End: 2023-07-24

## 2023-07-24 RX ORDER — DIPHENHYDRAMINE HYDROCHLORIDE 50 MG/ML
25 INJECTION INTRAMUSCULAR; INTRAVENOUS EVERY 6 HOURS PRN
Status: DISCONTINUED | OUTPATIENT
Start: 2023-07-24 | End: 2023-07-24 | Stop reason: HOSPADM

## 2023-07-24 RX ORDER — ROCURONIUM BROMIDE 10 MG/ML
INJECTION, SOLUTION INTRAVENOUS
Status: DISCONTINUED | OUTPATIENT
Start: 2023-07-24 | End: 2023-07-24

## 2023-07-24 RX ORDER — OXYCODONE HYDROCHLORIDE 5 MG/1
5 TABLET ORAL EVERY 4 HOURS PRN
Qty: 30 TABLET | Refills: 0 | Status: SHIPPED | OUTPATIENT
Start: 2023-07-24 | End: 2023-08-07 | Stop reason: ALTCHOICE

## 2023-07-24 RX ORDER — PROPOFOL 10 MG/ML
VIAL (ML) INTRAVENOUS
Status: DISCONTINUED | OUTPATIENT
Start: 2023-07-24 | End: 2023-07-24

## 2023-07-24 RX ORDER — NAPROXEN 500 MG/1
500 TABLET ORAL 2 TIMES DAILY WITH MEALS
Qty: 60 TABLET | Refills: 0 | Status: SHIPPED | OUTPATIENT
Start: 2023-07-24 | End: 2023-08-23

## 2023-07-24 RX ORDER — METOCLOPRAMIDE HYDROCHLORIDE 5 MG/ML
10 INJECTION INTRAMUSCULAR; INTRAVENOUS ONCE
Status: DISCONTINUED | OUTPATIENT
Start: 2023-07-24 | End: 2023-07-24 | Stop reason: HOSPADM

## 2023-07-24 RX ORDER — SODIUM CHLORIDE, SODIUM LACTATE, POTASSIUM CHLORIDE, CALCIUM CHLORIDE 600; 310; 30; 20 MG/100ML; MG/100ML; MG/100ML; MG/100ML
INJECTION, SOLUTION INTRAVENOUS CONTINUOUS
Status: DISCONTINUED | OUTPATIENT
Start: 2023-07-24 | End: 2023-07-24 | Stop reason: HOSPADM

## 2023-07-24 RX ORDER — FENTANYL CITRATE 50 UG/ML
25 INJECTION, SOLUTION INTRAMUSCULAR; INTRAVENOUS EVERY 5 MIN PRN
Status: DISCONTINUED | OUTPATIENT
Start: 2023-07-24 | End: 2023-07-24 | Stop reason: HOSPADM

## 2023-07-24 RX ORDER — CHLORHEXIDINE GLUCONATE ORAL RINSE 1.2 MG/ML
10 SOLUTION DENTAL
Status: DISCONTINUED | OUTPATIENT
Start: 2023-07-24 | End: 2023-07-24 | Stop reason: HOSPADM

## 2023-07-24 RX ORDER — ACETAMINOPHEN 10 MG/ML
INJECTION, SOLUTION INTRAVENOUS
Status: DISCONTINUED | OUTPATIENT
Start: 2023-07-24 | End: 2023-07-24

## 2023-07-24 RX ORDER — ONDANSETRON 2 MG/ML
4 INJECTION INTRAMUSCULAR; INTRAVENOUS DAILY PRN
Status: DISCONTINUED | OUTPATIENT
Start: 2023-07-24 | End: 2023-07-24 | Stop reason: HOSPADM

## 2023-07-24 RX ORDER — CEFAZOLIN SODIUM 2 G/50ML
2 SOLUTION INTRAVENOUS
Status: COMPLETED | OUTPATIENT
Start: 2023-07-24 | End: 2023-07-24

## 2023-07-24 RX ORDER — ASPIRIN 81 MG/1
81 TABLET ORAL 2 TIMES DAILY
Qty: 28 TABLET | Refills: 0 | Status: SHIPPED | OUTPATIENT
Start: 2023-07-24 | End: 2023-08-07

## 2023-07-24 RX ORDER — MEPERIDINE HYDROCHLORIDE 25 MG/ML
12.5 INJECTION INTRAMUSCULAR; INTRAVENOUS; SUBCUTANEOUS ONCE AS NEEDED
Status: DISCONTINUED | OUTPATIENT
Start: 2023-07-24 | End: 2023-07-24 | Stop reason: HOSPADM

## 2023-07-24 RX ORDER — EPINEPHRINE 1 MG/ML
INJECTION, SOLUTION, CONCENTRATE INTRAVENOUS
Status: DISCONTINUED | OUTPATIENT
Start: 2023-07-24 | End: 2023-07-24 | Stop reason: HOSPADM

## 2023-07-24 RX ORDER — ACETAMINOPHEN 500 MG
1000 TABLET ORAL EVERY 8 HOURS PRN
Qty: 60 TABLET | Refills: 0 | Status: SHIPPED | OUTPATIENT
Start: 2023-07-24

## 2023-07-24 RX ORDER — LIDOCAINE HYDROCHLORIDE 20 MG/ML
INJECTION, SOLUTION EPIDURAL; INFILTRATION; INTRACAUDAL; PERINEURAL
Status: DISCONTINUED | OUTPATIENT
Start: 2023-07-24 | End: 2023-07-24

## 2023-07-24 RX ORDER — ROPIVACAINE HYDROCHLORIDE 5 MG/ML
INJECTION, SOLUTION EPIDURAL; INFILTRATION; PERINEURAL
Status: COMPLETED | OUTPATIENT
Start: 2023-07-24 | End: 2023-07-24

## 2023-07-24 RX ORDER — SODIUM CHLORIDE 9 MG/ML
INJECTION, SOLUTION INTRAVENOUS CONTINUOUS
Status: DISCONTINUED | OUTPATIENT
Start: 2023-07-24 | End: 2023-07-24 | Stop reason: HOSPADM

## 2023-07-24 RX ADMIN — GLYCOPYRROLATE 0.2 MG: 0.2 INJECTION, SOLUTION INTRAMUSCULAR; INTRAVITREAL at 08:07

## 2023-07-24 RX ADMIN — MIDAZOLAM 2 MG: 1 INJECTION INTRAMUSCULAR; INTRAVENOUS at 08:07

## 2023-07-24 RX ADMIN — FENTANYL CITRATE 50 MCG: 50 INJECTION, SOLUTION INTRAMUSCULAR; INTRAVENOUS at 09:07

## 2023-07-24 RX ADMIN — LIDOCAINE HYDROCHLORIDE 3 ML: 20 INJECTION, SOLUTION EPIDURAL; INFILTRATION; INTRACAUDAL; PERINEURAL at 08:07

## 2023-07-24 RX ADMIN — PROPOFOL 150 MG: 10 INJECTION, EMULSION INTRAVENOUS at 08:07

## 2023-07-24 RX ADMIN — SODIUM CHLORIDE, SODIUM LACTATE, POTASSIUM CHLORIDE, AND CALCIUM CHLORIDE: 600; 310; 30; 20 INJECTION, SOLUTION INTRAVENOUS at 08:07

## 2023-07-24 RX ADMIN — SODIUM CHLORIDE, SODIUM LACTATE, POTASSIUM CHLORIDE, AND CALCIUM CHLORIDE: 600; 310; 30; 20 INJECTION, SOLUTION INTRAVENOUS at 09:07

## 2023-07-24 RX ADMIN — CEFAZOLIN SODIUM 2 G: 2 SOLUTION INTRAVENOUS at 08:07

## 2023-07-24 RX ADMIN — FENTANYL CITRATE 50 MCG: 50 INJECTION, SOLUTION INTRAMUSCULAR; INTRAVENOUS at 08:07

## 2023-07-24 RX ADMIN — SUGAMMADEX 200 MG: 100 INJECTION, SOLUTION INTRAVENOUS at 09:07

## 2023-07-24 RX ADMIN — ONDANSETRON 4 MG: 2 INJECTION INTRAMUSCULAR; INTRAVENOUS at 09:07

## 2023-07-24 RX ADMIN — ACETAMINOPHEN 1000 MG: 10 INJECTION, SOLUTION INTRAVENOUS at 08:07

## 2023-07-24 RX ADMIN — ROCURONIUM BROMIDE 40 MG: 10 SOLUTION INTRAVENOUS at 08:07

## 2023-07-24 RX ADMIN — LIDOCAINE HYDROCHLORIDE 50 MG: 20 INJECTION, SOLUTION EPIDURAL; INFILTRATION; INTRACAUDAL; PERINEURAL at 08:07

## 2023-07-24 RX ADMIN — DEXAMETHASONE SODIUM PHOSPHATE 8 MG: 4 INJECTION, SOLUTION INTRA-ARTICULAR; INTRALESIONAL; INTRAMUSCULAR; INTRAVENOUS; SOFT TISSUE at 08:07

## 2023-07-24 RX ADMIN — ROPIVACAINE HYDROCHLORIDE 20 ML: 5 INJECTION, SOLUTION EPIDURAL; INFILTRATION; PERINEURAL at 08:07

## 2023-07-24 NOTE — PATIENT INSTRUCTIONS
DISCHARGE INSTRUCTIONS FOR SHOULDER MANIPULATION / LYSIS OF ADHESIONS     Contact the [facility] at [phone] if you have questions about your instructions or follow-up appointment.     DIET:   Start with clear liquids and light foods to minimize nausea. Once these are tolerated, advance to a regular diet.     DRESSING AND WOUND CARE:   Keep the dressing clean and dry. It is normal for there to be some drainage after surgery since the shoulder was irrigated with large amounts of fluid. Reinforce with additional gauze as necessary.   Remove the dressing the 2nd day after surgery and begin changing daily with clean gauze or Band-Aids®. Keep your incisions covered until you follow up in clinic.   If you have Steri-Strips in place of stitches, allow them to stay in place as long as possible. Steri-Strips are made of a fabric material that can get wet in the shower and pat dry with a towel. They usually fall off on their own within 7 to 10 days. You may trim the edges as they begin to curl.     BATHING:   You may bathe or shower on the 2nd day after surgery, but do not scrub or soak the incisions. Dry the area by gently blotting it with a gauze or towel. After it is completely dry, cover the wound with clean gauze or Band-Aids®. Do NOT submerge the incisions (bath/swim) until after the sutures are removed and the wound has completely healed.     ACTIVITY:    Ice should be applied to the shoulder for 20-30 minutes, 5-6 times a day, to help control pain and swelling. Apply additional times as needed, especially after exercise, for the first 3-4 weeks. Do not apply ice directly to the skin; use a thin barrier in between. Also, do not use heat.    Elevate the shoulder by sleeping as upright as possible using extra pillows or a recliner. Do this for the first few days to help decrease pain and swelling.    Wear the sling when up and about until you are comfortable without it. This usually takes about a  week. You do not have to wear the sling to sleep.    When your block wears off, start the following exercises:  Remove the sling for 5-10 minutes, 3 times a day, to do the following exercises:   Fully bend & straighten your fingers, your wrist, and your elbow several times.    the forearm of your surgical shoulder with your normal arm and gently rotate your shoulder in and out. You can also try to slowly bring the arm overhead. You may be more comfortable doing these lying on your back.   Lean forward, bracing yourself on a table/counter with your normal arm. Let your surgical arm relax and hang straight down. Shift your weight so that your arm moves side to side, front to back, and in gentle circles like a pendulum or elephant's trunk. Use your body to generate the movement for this, NOT your surgical shoulder's muscles. (see drawing below)        Advance your activity as tolerated. Use the arm in daily activities as comfort allows. No heavy lifting or strenuous activity until cleared by your surgeon.    Physical therapy should be started the day after surgery. Take your therapy prescription to the PT clinic of your choice. If you have not received a therapy prescription, please contact your surgeon's office to have a script sent to your physical therapist.     PAIN CONTROL:   It is important to stay ahead of pain as it becomes challenging to get under control if you fall behind. Ice and elevation can help and should be used as much as possible in the first few days.    Narcotic pain medications, such as hydrocodone and oxycodone, should be taken as prescribed. Wean off as soon as possible. Take these with food to decrease the chances of nausea and vomiting. Do not drink alcohol, drive a vehicle, or use heavy machinery while taking narcotic pain medications.    NSAID medications are used for pain control and to decrease inflammation. You may be prescribed an NSAID such as ketorolac (Toradol). Take as  instructed. Other NSAID medications such as ibuprofen, Motrin, Advil, naproxen, or Aleve can be used once you have finished the Toradol, or if a prescription for Toradol was not provided.    Acetaminophen (Tylenol) is an effective over-the-counter pain medication that can be used with NSAID medications and non-acetaminophen containing narcotics such as plain oxycodone.     ASPIRIN FOR PREVENTION OF BLOOD CLOTS:   You should take one 81 mg baby aspirin twice daily for two weeks starting the evening of the day you have surgery unless instructed otherwise or taking a different blood thinner such as enoxaparin or warfarin. If you are aware that you are at high risk for a blood clot, notify your physician as soon as possible.   Take aspirin at least 30 minutes before taking ibuprofen or Toradol.    CONSTIPATION PREVENTION:   Anesthesia and pain medications, changes in eating and drinking, and less activity can all lead to constipation after surgery. To prevent or reduce constipation, take an over-the-counter stool softener (brands include Colace and Miralax). Follow the directions on the bottle. Drink plenty of water and eat high fiber foods including whole grains, fresh fruits, vegetables, beans, prunes or prune juice.     PROBLEMS TO REPORT:   Persistent bloody drainage that soaks through reinforced dressings.   Fever greater than 101F or 38C.   Incision that is very red, swollen, draining pus, shows red streaks, or feels hot.   Inability to urinate within 8 hours of surgery (a rare effect of the anesthesia).   If you develop a rash, generalized itching or swelling from the medications, STOP the medication and call the clinic or the orthopedic surgery resident on call.   Daytime calls should be directed to the [facility] at [phone].   Night-time and weekend calls should be directed to the after hours nurse, who can be reached at [phone].     FREQUENTLY ASKED QUESTIONS     WHAT DAILY ACTIVITIES  CAN I DO?   After your surgery, daily activities such as walking, getting dressed, and desk work should not cause damage to your shoulder. No heavy lifting with your surgical arm or putting yourself at risk of falling.     CAN I DRIVE OR RIDE BY CAR/ TRAIN/ PLANE?   You should not drive while using a sling. There are no forced restrictions regarding operating a motor vehicle, however you must always be the  of whether you are able to operate it safely. You should not drive while taking narcotic pain medications. You may ride in a car as needed after surgery. Discuss with your surgeon if you are planning a trip using train or plane for transportation.     WHAT ABOUT WORK?   You may return to an office-type job or to school whenever comfortable. For most patients this occurs 1-2 weeks after surgery. For more active jobs that require some lifting, you can wait until after your follow-up appointment. Any other unusual types of jobs should be discussed to determine a date for return to work.     WHAT ABOUT SWELLING?   Expect swelling as a normal process after surgery. Ice, elevation, and other treatments provided at physical therapy will allow this to improve in time. Some swelling may remain for up to 8 weeks, and this is normal.     WHAT IF IT REALLY HURTS TOO MUCH?   Surgery hurts and you cannot expect to be pain free, but our goal is for it to be tolerable. Try to use all available pain therapies such as narcotics, NSAIDS, and acetaminophen. Always try more ice and elevation. If the pain is not tolerable, call the clinic or the after hours nurse.

## 2023-07-24 NOTE — H&P
H&P Update:      Patient seen and examined at the bedside this morning. There have been no interval changes since last clinic visit.    Patient is here today for Left shoulder arthroscopy, lysis of adhesions and manipulation under anesthesia    Please see last clinic note below from 6/23/23 for more details.    Orthopaedic Follow-Up Visit    Last Appointment: 6/7/23  Diagnosis: S/P left shoulder steven-arthoplasty (6/16/22) with capsular tightness  Prior Procedure: None    Miriam Jett is a 27 y.o. female who is here for f/u evaluation of her left shoulder. The patient was last seen here by me on 6/7/23 at which point she was almost 1 year out from her left shoulder steven-arthoplasty and reported continued stiffness and limited range of motion which had not greatly improved with considerable physical therapy at that time. We discussed that due to these continued limitations I recommend moving forward with operative treatment in the form of left shoulder arthroscopy with lysis of adhesions and manipulation under anesthesia. She wanted to discuss it with her family further before proceeding.      To review her history, Miriam Jett is a 27 y.o. female who underwent left shoulder steven-arthroplasty on 6/16/22. She has had some continued stiffness/tightness following her surgery. We have previously discussed moving forward with operative treatment in the form of left shoulder arthroscopy with lysis of adhesions and manipulation under anesthesia.    Patient's medications, allergies, past medical, surgical, social and family histories were reviewed and updated as appropriate.    Review of Systems   All systems reviewed were negative.  Specifically, the patient denies fever, chills, weight loss, chest pain, shortness of breath, or dyspnea on exertion.      Past Medical History:   Diagnosis Date    Miscarriage        Past Surgical History:   Procedure Laterality Date    BONE GRAFT Left 7/13/2021     Procedure: BONE GRAFT;  Surgeon: Spenser Krishnamurthy MD;  Location: [facility] OR;  Service: Orthopedics;  Laterality: Left;  pro-dense    CORE DECOMPRESSION OF HEAD OF FEMUR Left 7/13/2021    Procedure: CORE DECOMPRESSION, FEMUR, HEAD;  Surgeon: Spenser Krishnamurthy MD;  Location: [facility] OR;  Service: Orthopedics;  Laterality: Left;    HIP ARTHROPLASTY Right 10/6/2021    Procedure: ARTHROPLASTY, HIP;  Surgeon: Spenser Krishnamurthy MD;  Location: [facility] OR;  Service: Orthopedics;  Laterality: Right;    INJECTION OF JOINT Right 7/12/2021    Procedure: right IA Hip Joint injection- per Dr Ruelas;  Surgeon: Juan David Ho MD;  Location: [facility] PAIN T;  Service: Pain Management;  Laterality: Right;    PARTIAL ARTHROPLASTY OF SHOULDER Left 6/16/2022    Procedure: HEMIARTHROPLASTY, SHOULDER;  Surgeon: Michael Ramirez MD;  Location: [facility] OR;  Service: Orthopedics;  Laterality: Left;       Current Discharge Medication List        CONTINUE these medications which have NOT CHANGED    Details   naproxen (NAPROSYN) 500 MG tablet Take 1 tablet (500 mg total) by mouth 2 (two) times daily.  Qty: 60 tablet, Refills: 1    Associated Diagnoses: S/P shoulder hemiarthroplasty, left      oxyCODONE-acetaminophen (PERCOCET) 5-325 mg per tablet Take 1 tablet by mouth every 4 (four) hours as needed for Pain.      acetaminophen (TYLENOL) 500 MG tablet Take 2 tablets (1,000 mg total) by mouth every 8 (eight) hours as needed for Pain.  Qty: 60 tablet, Refills: 0      pregabalin (LYRICA) 50 MG capsule Take 50 mg by mouth 2 (two) times daily.             Family History   Problem Relation Age of Onset    Birth defects Neg Hx        Review of patient's allergies indicates:  No Known Allergies      Objective:      Physical Exam  Patient is alert and oriented, no distress. Skin is intact. Neuro is normal with no focal motor or sensory findings.    Cervical exam is unremarkable. Intact cervical ROM. Negative Spurling's test    Physical Exam:  RIGHT     LEFT    Scap Dyskinesis/Winging (-)    (-)    Tenderness:          Greater Tuberosity  (-)    (-)  Bicipital Groove  (-)    (-)  AC joint   (-)    (-)  Other:     ROM:  Forward Elevation 180    100  Abduction  120    70  ER (at side)  80    40  IR   T8    Back pocket    Strength:   Supraspinatus  5/5    5/5  Infraspinatus  5/5    5/5  Subscap / IR  5/5    5/5     Special Tests:   Neer:    (-)    +   Jane:   (-)    +   SS Stress:   (-)    (-)   Bear Hug:   (-)    (-)   Arkdale's:   (-)    (-)   Resisted Thrower's:   (-)    (-)   Cross Arm Abduction:  (-)    (-)    Neurovascular examination  - Motor grossly intact bilaterally to shoulder abduction, elbow flexion and extension, wrist flexion and extension, and intrinsic hand musculature  - Sensation intact to light touch bilaterally in axillary, median, radial, and ulnar distributions  - Symmetrical radial pulses    Imaging:    XR Results:  X-Ray Shoulder 2 or More Views Left  Narrative: EXAM:  XR SHOULDER COMPLETE 2 OR MORE VIEWS LEFT    CLINICAL HISTORY:   Left shoulder joint pain    TECHNIQUE: 4 views of the left shoulder.    COMPARISON: None.    FINDINGS:    Postoperative changes with left humeral head arthroplasty evident.  No fracture.  Alignment appears normal.  AC joint appears normal.  Impression:  No acute findings.  See above.    Finalized on: 6/7/2023 3:36 PM By:  Moreno Jimenez MD  [facility]# [id number]      2023-06-07 15:39:[id number]    [facility]         Physician read: I agree with the above impression.    Assessment/Plan:   Miriam Jett is a 27 y.o. female with 1 year s/p left shoulder steven-arthoplasty - continued stiffness     Plan:    She is now almost 1 year out from her left shoulder steven-arthoplasty and reports continued stiffness and limited range of motion which has not greatly improved with considerable physical therapy at this time.   She has no real pain but has significant limitations with ROM that diminish her quality of life.  Due to  her continued limitations I recommend moving forward with operative treatment at this time. I recommend left shoulder arthroscopy with lysis of adhesions and manipulation under anesthesia.   We reviewed the proposed procedure in detail, which included discussion of risks and benefits, techniques, and possible complications of the procedure. Risks include infection, bleeding, damage to artery and nerves, continual pain and possible stiffness, and blood clots. We reviewed the post-operative restrictions, recovery period, and rehabilitation.  All patient questions were answered. Despite the risks, she elected to proceed with surgery and the consent was freely signed.  At least 10 minutes were spent instructing the patient in home care following surgery including, but not limited to: sling use, sleeping, hygiene, post-operative exercises, preventing post-operative complications, etc.  All questions were answered.  This service was performed under the direction of Michael Ramirez MD.  CPT 25391-UK.  Follow up with me 10-14 days after surgery          Michael Ramirez MD

## 2023-07-24 NOTE — ANESTHESIA PROCEDURE NOTES
Peripheral Block    Patient location during procedure: pre-op   Block not for primary anesthetic.  Reason for block: at surgeon's request and post-op pain management   Post-op Pain Location: left shoulder   Start time: 7/24/2023 8:22 AM  Timeout: 7/24/2023 8:20 AM   End time: 7/24/2023 8:23 AM    Staffing  Authorizing Provider: Saul Riddle MD  Performing Provider: Saul Riddle MD    Preanesthetic Checklist  Completed: patient identified, IV checked, site marked, risks and benefits discussed, surgical consent, monitors and equipment checked, pre-op evaluation and timeout performed  Peripheral Block  Patient position: sitting  Prep: ChloraPrep  Patient monitoring: heart rate, cardiac monitor, continuous pulse ox, continuous capnometry and frequent blood pressure checks  Block type: interscalene  Laterality: left  Injection technique: single shot  Needle  Needle type: Stimuplex   Needle gauge: 22 G  Needle length: 4 in  Needle localization: anatomical landmarks and ultrasound guidance   -ultrasound image captured on disc.  Assessment  Injection assessment: negative aspiration, negative parasthesia and local visualized surrounding nerve  Paresthesia pain: none  Heart rate change: no  Slow fractionated injection: yes    Medications:    Medications: ropivacaine (NAROPIN) injection 0.5% - Perineural   20 mL - 7/24/2023 8:22:00 AM  lidocaine (PF) 20 mg/mL (2%) injection - Other   3 mL - 7/24/2023 8:22:00 AM    Additional Notes  VSS.  [facility] RN monitoring vitals throughout procedure.  Patient tolerated procedure well.

## 2023-07-24 NOTE — PLAN OF CARE
LEFT INTERSCALENE block completed per anesthesia at bedside at this time. Patient tolerated well. VSS. Continuous cardiac and SPO2 monitoring in place.

## 2023-07-24 NOTE — ANESTHESIA POSTPROCEDURE EVALUATION
Anesthesia Post Evaluation    Patient: Miriam Jett    Procedure(s) Performed: Procedure(s) (LRB):  LYSIS,ADHESIONS,SHOULDER,ARTHROSCOPIC (Left)  MANIPULATION, SHOULDER (Left)    Final Anesthesia Type: general      Patient location during evaluation: PACU  Patient participation: Yes- Able to Participate  Level of consciousness: awake  Post-procedure vital signs: reviewed and stable  Pain management: adequate  Airway patency: patent    PONV status at discharge: No PONV  Anesthetic complications: no      Cardiovascular status: stable  Respiratory status: unassisted  Hydration status: euvolemic  Follow-up not needed.          Vitals Value Taken Time   /69 07/24/23 1040   Temp 36.5 °C (97.7 °F) 07/24/23 0953   Pulse 58 07/24/23 1040   Resp 18 07/24/23 1040   SpO2 100 % 07/24/23 1040   Vitals shown include unvalidated device data.      No case tracking events are documented in the log.      Pain/Hakan Score: Hakan Score: 9 (7/24/2023 10:40 AM)

## 2023-07-24 NOTE — OP NOTE
ORTHOPAEDIC SURGERY OPERATIVE REPORT    DATE OF SERVICE: 7/24/2023    PRIMARY SURGEON: Michael Ramirez MD    Assistant Surgeon: SMA Jae. Skilled assistance was medically necessary for this case to help with extremity positioning, soft tissue retractions and instrumentation.     DATE OF SURGERY: 7/24/2023    PATIENT'S NAME: Miriam Jett    MEDICAL RECORD NUMBER: [id number]     PREOPERATIVE DIAGNOSES:   1. Left shoulder adhesive capsulitis    POSTOPERATIVE DIAGNOSES:   1. Left shoulder adhesive capsulitis    PROCEDURE PERFORMED:   1. Left shoulder arthroscopic lysis of adhesions and manipulation under anesthesia    ANESTHESIA: General plus regional.     IMPLANTS USED:   none    COMPLICATIONS: None.     POSITION: Beachchair.     BRIEF INDICATIONS: This is a 27 y.o. female who presents with symptomatic LEFT shoulder adhesive capsulitis. She has failed conservative treatment measures. We discussed surgical treatment options including risks and benefits. After a detailed explanation of the technical aspects of the procedure, the patient elected to proceed and signed consent.    OPERATIVE FINDINGS:   EUA: severely limited ROM; FF 90, ABD 70, ER 30, IR 45    DESCRIPTION OF PROCEDURE:   The patient was identified in the preoperative holding area. The operative upper extremity was marked.  Consent was verified. The patient was then taken for preoperative block. Following this, the patient was taken to the operating room where he was laid supine on the operative table. General anesthetic was induced. Preoperative time-out was performed verifying the patient, procedure, and preoperative antibiotics. The patient was then positioned in the beachchair position with all bony prominences well padded. The operative upper extremity was then prepped and draped in the usual sterile fashion.     A posterior portal was established with an #11-blade. The arthroscope was inserted into the glenohumeral joint  atraumatically. There was abundant scar tissue present within the shoulder joint and visualization and instrumentation were challenging. We were able to create an anterior portal using an outside-in technique with an #18-gauge spinal needle into the rotator interval. We then used an #11-blade for stab incision and then followed this with a straight hemostat to open our anterior portal. We then used a combination of VAPR and shaver to debride the scar within the rotator interval. The subscapularis tendon was able to be visualized and care was taken to release capsular adhesions around the tendon. We worked inferiorly around the shoulder and were then able to switch our viewing portal to the front and perform posterior capsular release. Once this was performed we then removed our instruments and performed the manipulation. The shoulder was taken through a full range of motion and multiple adhesions were felt to be released. We were able to achieve 170 degrees FF, 130 ABD, 90 ER, 90 IR. Pictures were taken of the achieved ranges of motion.    We closed the portals with #3-0 nylon suture. Sterile dressings were applied. The patient was then placed in an UltraSling, awoken from general anesthetic, and taken to recovery room in a stable condition.    Plan:  RASHAUN/DEDRICK protocol  WBAT LUE in sling  Ok to be out of sling for pendulums, elbow, wrist ROM  ASA 81mg BID x 2wks for DVT ppx  f/u 10-14 days for suture removal      Michael Ramirez MD

## 2023-07-24 NOTE — ANESTHESIA PROCEDURE NOTES
Intubation    Date/Time: 7/24/2023 8:33 AM  Performed by: Quincy Brizuela CRNA  Authorized by: Saul Riddle MD     Intubation:     Induction:  Intravenous    Intubated:  Postinduction    Mask Ventilation:  Easy mask    Attempts:  1    Attempted By:  CRNA    Method of Intubation:  Video laryngoscopy    Blade:  Melvin 3    Laryngeal View Grade: Grade I - full view of cords      Difficult Airway Encountered?: No      Complications:  None    Airway Device:  Oral endotracheal tube    Airway Device Size:  7.0    Style/Cuff Inflation:  Cuffed    Inflation Amount (mL):  5    Tube secured:  21    Secured at:  The lips    Placement Verified By:  Capnometry and Revisualization with laryngoscopy    Complicating Factors:  None    Findings Post-Intubation:  BS equal bilateral and atraumatic/condition of teeth unchanged

## 2023-07-24 NOTE — TRANSFER OF CARE
"Anesthesia Transfer of Care Note    Patient: Miriam Jett    Procedure(s) Performed: Procedure(s) (LRB):  LYSIS,ADHESIONS,SHOULDER,ARTHROSCOPIC (Left)  MANIPULATION, SHOULDER (Left)    Patient location: PACU    Anesthesia Type: general    Transport from OR: Transported from OR on room air with adequate spontaneous ventilation    Post pain: adequate analgesia    Post assessment: no apparent anesthetic complications and tolerated procedure well    Post vital signs: stable    Level of consciousness: awake    Nausea/Vomiting: no nausea/vomiting    Complications: none    Transfer of care protocol was followed      Last vitals:   Visit Vitals  BP (!) 143/83 (BP Location: Right arm, Patient Position: Lying)   Pulse 73   Temp 36.6 °C (97.9 °F) (Temporal)   Resp 14   Ht 5' 7" (1.702 m)   Wt 77.3 kg (170 lb 8.4 oz)   LMP 06/21/2023   SpO2 100%   Breastfeeding No   BMI 26.71 kg/m²     "

## 2023-07-24 NOTE — DISCHARGE SUMMARY
[facility]  Discharge Note  Short Stay    Procedure(s) (LRB):  LYSIS,ADHESIONS,SHOULDER,ARTHROSCOPIC (Left)  MANIPULATION, SHOULDER (Left)      OUTCOME: Patient tolerated treatment/procedure well without complication and is now ready for discharge.    DISPOSITION: Home or Self Care    FINAL DIAGNOSIS:  Left shoulder adhesive capsulitis    FOLLOWUP: In clinic    DISCHARGE INSTRUCTIONS:  No discharge procedures on file.     TIME SPENT ON DISCHARGE: 10 minutes

## 2023-07-26 ENCOUNTER — CLINICAL SUPPORT (OUTPATIENT)
Dept: REHABILITATION | Facility: HOSPITAL | Age: 28
End: 2023-07-26
Attending: STUDENT IN AN ORGANIZED HEALTH CARE EDUCATION/TRAINING PROGRAM
Payer: MEDICAID

## 2023-07-26 DIAGNOSIS — M75.02 ADHESIVE CAPSULITIS OF LEFT SHOULDER: ICD-10-CM

## 2023-07-26 DIAGNOSIS — Z96.612 S/P SHOULDER HEMIARTHROPLASTY, LEFT: ICD-10-CM

## 2023-07-26 PROCEDURE — 97161 PT EVAL LOW COMPLEX 20 MIN: CPT | Mod: PN

## 2023-07-26 PROCEDURE — 97112 NEUROMUSCULAR REEDUCATION: CPT | Mod: PN

## 2023-07-26 PROCEDURE — 97530 THERAPEUTIC ACTIVITIES: CPT | Mod: PN

## 2023-07-26 NOTE — PLAN OF CARE
[facility]   Physical Therapy Initial Evaluation     Date: 7/26/2023   Name: Miriam Jett  Clinic Number: [id number]    Therapy Diagnosis:   Encounter Diagnoses   Name Primary?    S/P shoulder hemiarthroplasty, left     Adhesive capsulitis of left shoulder      Physician: Michael Ramirez*    Physician Orders: PT Eval and Treat   Medical Diagnosis from Referral: S/P shoulder hemiarthroplasty, left Adhesive capsulitis of left shoulder (past total shoulder LUE)  Evaluation Date: 7/26/2023  Authorization Period Expiration: 6/22/2023  Plan of Care Expiration: 10/4/2023  Progress Note Due: 10th visit  Visit # / Visits authorized: 1/ 12   FOTO: 1/ 3     Precautions: Standard   Sling for 2-6 weeks when not completing HEP  Per MD note:  RASHAUN/DEDRICK protocol  WBAT LUE in sling  Ok to be out of sling for pendulums, elbow, wrist ROM    Time In: 9:45  Time Out: 10:45  Total Appointment Time (timed & untimed codes): 60 minutes    SUBJECTIVE   Date of onset: 7/24/2023    History of current condition - Miriam reports: mild discomfort and soreness post surgical procedure. She reports compliance with sling and post op recommendations.   Pt seen in the past and is familiar with PT. Due to severe adhesions and ROM restrictions manipulation performed.     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  Date of Surgery: 6/16/22    Falls: none    Imaging, none:     Prior Therapy: yes  Social History:  lives with their family  Occupation: not currently   Prior Level of Function: mod I  Current Level of Function: modA/Joseph    Pain:  Current 2/10, worst 5/10, best 0/10   Location: left shoulder  left shoulder(s)  Description: Aching  Aggravating Factors: Lifting  Easing Factors: relaxation    Patients goals: improve left shoulder      Medical History:   Past Medical History:   Diagnosis Date    Miscarriage        Surgical History:    Miriam Jett  has a past surgical history that includes Injection of joint (Right, 7/12/2021); Core decompression of head of femur (Left, 7/13/2021); Bone graft (Left, 7/13/2021); Hip Arthroplasty (Right, 10/6/2021); and Partial arthroplasty of shoulder (Left, 6/16/2022).    Medications:   Miriam has a current medication list which includes the following prescription(s): acetaminophen, aspirin, naproxen, oxycodone, and pregabalin.    Allergies:   Review of patient's allergies indicates:  No Known Allergies       OBJECTIVE         CMS Impairment/Limitation/Restriction for FOTO Shoulder Survey    Therapist reviewed FOTO scores for Miriam Jett on 7/26/2023.   FOTO documents entered into [employer] - see Media section.    Intake Score: 32%          Posture: Pt noted to present with forward head/rounded shoulder posture, increased thoracic Kyphosis,   B scapula elevated and protracted, left scapula greater than right.  poor scapulo/humeral rhythm.      ROM:  Shoulder  AAROM/PROM Right Left Pain/Dysfunction with Movement   flexion WFL  150 Passive and with wand   extension WFL  10    abduction WFL  100 UT compensation noted, assistance provided    Internal rotation WFL  35    ER WFL  25      Scapular Control/Dyskinesis:     Normal / Subtle / Obvious  Comments    Left  Obvious  Limited mobility at this time   Right  normal         Decreased MLT: Anterior chest musculature and latissimus dorsi.      Palpation:   Patient tender with increased tone over left upper trapezial muscle, levator scapula, latissimus dorsi, teres minor, posterior deltoid, infraspinatus, subscapularis, anterior  chest muscle, posterior cervical muscle, sternocleidomastoid, AC joint, suboccipital muscle, medial/lateral scapular muscle's.     Dressing removed: two small closed incisions noted (one anterior and one posterior) with guaze adhered. Pt provided proper post care and verbalized understanding.     TREATMENT     Total  Treatment time (time-based codes) separate from Evaluation: 30 minutes      Miriam received the treatments listed below:      Pt received therapeutic exercises to develop ROM, flexibility, and posture for 15 minutes including:  Seated pulley 3/3  Pendulums fwd/bkd and lateral 1min ea  Supine AAROM: flexion, abd, IR/ER in scaption 20x ea  PROM: all planes within tolerance     Pt received the following manual therapy techniques: Joint mobilizations and Soft tissue Mobilization were applied to the: left shoulder for 10 minutes, including:  STM to UT, left pec, lat, and SCM  GHJ mobs: inf and post, oscillation   Scap release and mobs sup and SL    Pt participated in neuromuscular re-education activities to improve: Kinesthetic, Sense, Proprioception, and Posture for 5 minutes. The following activities were included:  Prone scap retraction  Seated scap retraction   Seated and standing posture    PATIENT EDUCATION AND HOME EXERCISES     Education provided:   - yes, sling wear and appropriate motion for LUE    Written Home Exercises Provided: yes. Exercises were reviewed and Miriam was able to demonstrate them prior to the end of the session.  Miriam demonstrated good  understanding of the education provided. See EMR under Patient Instructions for exercises provided during therapy sessions.    ASSESSMENT     Miriam is a 27 y.o. female referred to outpatient Physical Therapy with a medical diagnosis of S/P shoulder hemiarthroplasty, left Adhesive capsulitis of left shoulder, pt presents with signs and symptoms consistent with diagnosis. The patient presents with impairments which include weakness, impaired endurance, impaired self care skills, impaired functional mobility, decreased upper extremity function, pain, decreased ROM, impaired skin, impaired joint extensibility, impaired muscle length, and orthopedic precautions.  These impairments are limiting patient's ability to complete ADLs, home chores, driving,  community tasks, and returning to work.     Pt prognosis is Good due to personal factors and co-morbidities listed below.   Pt will benefit from skilled outpatient Physical Therapy to address the deficits stated above and in the chart below, provide pt/family education, and to maximize pt's level of independence.     Plan of care discussed with patient: Yes  Pt's spiritual, cultural and educational needs considered and patient is agreeable to the plan of care and goals as stated below:     Anticipated Barriers for therapy: transportation    Medical Necessity is demonstrated by the following  History  Co-morbidities and personal factors that may impact the plan of care Co-morbidities:   See medical hx    Personal Factors:   lifestyle     moderate   Examination  Body Structures and Functions, activity limitations and participation restrictions that may impact the plan of care Body Regions:   upper extremities    Body Systems:    ROM  strength  joint mobility, muscle tone, muscle length    Participation Restrictions:   See current level of function listed above     Activity limitations:   Learning and applying knowledge  no deficits    General Tasks and Commands  no deficits    Communication  no deficits    Mobility  lifting and carrying objects    Self care  washing oneself (bathing, drying, washing hands)  dressing    Domestic Life  shopping  cooking  doing house work (cleaning house, washing dishes, laundry)    Interactions/Relationships  no deficits    Life Areas  employment    Community and Social Life  community life  recreation and leisure         low   Clinical Presentation stable and uncomplicated low   Decision Making/ Complexity Score: low     Goals: Reviewed:7/26/2023    Short Term Goals: In 6 weeks   1. Patient  to be educated on HEP.  2.Patient to increase shoulder AROM to 110 degrees of flexion and scaption, in order to improve function of involved UE.  3.Patient to increase strength to 3+/5, in order to  improve endurance with activity.  4.Patient to have pain less than 2/10 at worst, in order to improve QOL.  5.Patient to improve score on the FOTO, in order to improve QOL.   6. Pt to improve functional IR/ER to be independent with washing body and hair, dressing and grooming tasks.     Long Term Goals: In 10 weeks  1. Patient to improve score on the FOTO to 30% or less, in order to improve QOL.  2. Patient to demo increase in UE strength to 4+/5, in order to improve endurance with activity.  3. Patient to have decreased pain to 2/10 at worst, in order to improve QOL.  4. Patient to demo increase shoulder AROM to WFL, in order to improve function of involved UE.  5. Patient to perform daily activities including brushing her hair, lifting light weight items overhead, and be independent with household chores without increased symptoms.      PLAN     Plan of care Certification: 7/26/2023 to 10/4/2023.    Outpatient Physical Therapy 2-3 times weekly for 10 weeks to include the following interventions: Electrical Stimulation prn, Manual Therapy, Moist Heat/ Ice, Neuromuscular Re-ed, Orthotic Management and Training, Patient Education, Self Care, Therapeutic Activities, and Therapeutic Exercise.     Rubi Mcgee, PT      I CERTIFY THE NEED FOR THESE SERVICES FURNISHED UNDER THIS PLAN OF TREATMENT AND WHILE UNDER MY CARE   Physician's comments:     Physician's Signature: ___________________________________________________

## 2023-07-28 ENCOUNTER — CLINICAL SUPPORT (OUTPATIENT)
Dept: REHABILITATION | Facility: HOSPITAL | Age: 28
End: 2023-07-28
Payer: MEDICAID

## 2023-07-28 DIAGNOSIS — M75.02 ADHESIVE CAPSULITIS OF LEFT SHOULDER: Primary | ICD-10-CM

## 2023-07-28 PROCEDURE — 97110 THERAPEUTIC EXERCISES: CPT | Mod: PN

## 2023-07-28 NOTE — PROGRESS NOTES
[facility]   Physical Therapy Treatment Note      Name: Miriam Jett  Clinic Number: [id number]    Therapy Diagnosis:   Encounter Diagnosis   Name Primary?    Adhesive capsulitis of left shoulder Yes     Physician: Michael Ramirez    Visit Date: 7/28/2023    Physician Orders: PT Eval and Treat   Medical Diagnosis from Referral: S/P shoulder hemiarthroplasty, left Adhesive capsulitis of left shoulder (past total shoulder LUE)  Evaluation Date: 7/26/2023  Authorization Period Expiration: 6/22/2023  Plan of Care Expiration: 10/4/2023  Progress Note Due: 10th visit  Visit # / Visits authorized: 2/ 12   FOTO: 1/ 3      Precautions: Standard              Sling for 2-6 weeks when not completing HEP  Per MD note:  RASHAUN/DEDRICK protocol  WBAT LUE in sling  Ok to be out of sling for pendulums, elbow, wrist ROM     Time In: 10:05  Time Out: 11:05  Total Appointment Time (timed & untimed codes): 60 minutes  PTA Visit #: 0/5       Subjective     Pt reports: pleased with her progress. Performing HEP as prescribed.  She was compliant with home exercise program.  Response to previous treatment: no pain  Functional change: improved ROM    Pain: 0/10  Location: left shoulder      Objective      Objective Measures updated at progress report unless specified.     Treatment     Miriam received the treatments listed below:      therapeutic exercises, neuro reeducation to develop strength, ROM, flexibility, and posture for 60 minutes including:  Pulleys flexion and scapion 3 minutes each  AAROM all planes standing and supine  AROM all planes supine and standing  Postural reeducation for scapula retraction to avoid shoulder elevation substitution  PROM all planes  SH IR/ER/extension with yellow band with cues for scapula stabilization      Patient Education and Home Exercises       Education provided:   - HEP    Written Home Exercises Provided: Patient instructed to cont prior HEP. Exercises  were reviewed and Miriam was able to demonstrate them prior to the end of the session.  Miriam demonstrated good  understanding of the education provided. See EMR under Patient Instructions for exercises provided during therapy sessions    Assessment     Miriam did excellent today. Presents wearing shoulder sling and reports no pain and being complinat with HEP. Exhibits WFL AA/PROM all planes. Has a shoulder elevation substitution with AROM against gravity due to scapula dyskinesia however with tactile cues was able to achieve full active flexion in supine with guidance for proper motion. Patient was approved to be removed from the sling.     Miriam Is progressing well towards her goals.   Pt prognosis is Excellent.     Pt will continue to benefit from skilled outpatient physical therapy to address the deficits listed in the problem list box on initial evaluation, provide pt/family education and to maximize pt's level of independence in the home and community environment.     Pt's spiritual, cultural and educational needs considered and pt agreeable to plan of care and goals.     Anticipated barriers to physical therapy: none    Goals:   Short Term Goals: In 6 weeks   1. Patient  to be educated on HEP.  2.Patient to increase shoulder AROM to 110 degrees of flexion and scaption, in order to improve function of involved UE.  3.Patient to increase strength to 3+/5, in order to improve endurance with activity.  4.Patient to have pain less than 2/10 at worst, in order to improve QOL.  5.Patient to improve score on the FOTO, in order to improve QOL.   6. Pt to improve functional IR/ER to be independent with washing body and hair, dressing and grooming tasks.      Long Term Goals: In 10 weeks  1. Patient to improve score on the FOTO to 30% or less, in order to improve QOL.  2. Patient to demo increase in UE strength to 4+/5, in order to improve endurance with activity.  3. Patient to have decreased pain to 2/10 at  worst, in order to improve QOL.  4. Patient to demo increase shoulder AROM to WFL, in order to improve function of involved UE.  5. Patient to perform daily activities including brushing her hair, lifting light weight items overhead, and be independent with household chores without increased symptoms    Plan     Plan of care Certification: 7/26/2023 to 10/4/2023.     Outpatient Physical Therapy 2-3 times weekly for 10 weeks to include the following interventions: Electrical Stimulation prn, Manual Therapy, Moist Heat/ Ice, Neuromuscular Re-ed, Orthotic Management and Training, Patient Education, Self Care, Therapeutic Activities, and Therapeutic Exercise.     Jacques Felix, PT

## 2023-08-07 ENCOUNTER — OFFICE VISIT (OUTPATIENT)
Dept: SPORTS MEDICINE | Facility: CLINIC | Age: 28
End: 2023-08-07
Payer: MEDICAID

## 2023-08-07 ENCOUNTER — CLINICAL SUPPORT (OUTPATIENT)
Dept: REHABILITATION | Facility: HOSPITAL | Age: 28
End: 2023-08-07
Payer: MEDICAID

## 2023-08-07 VITALS — BODY MASS INDEX: 26.75 KG/M2 | HEIGHT: 67 IN | WEIGHT: 170.44 LBS

## 2023-08-07 DIAGNOSIS — M75.02 ADHESIVE CAPSULITIS OF LEFT SHOULDER: Primary | ICD-10-CM

## 2023-08-07 DIAGNOSIS — Z98.890 STATUS POST ARTHROSCOPY OF LEFT SHOULDER: Primary | ICD-10-CM

## 2023-08-07 PROCEDURE — 97110 THERAPEUTIC EXERCISES: CPT | Mod: PN

## 2023-08-07 PROCEDURE — 1159F PR MEDICATION LIST DOCUMENTED IN MEDICAL RECORD: ICD-10-PCS | Mod: CPTII,,, | Performed by: PHYSICIAN ASSISTANT

## 2023-08-07 PROCEDURE — 1160F RVW MEDS BY RX/DR IN RCRD: CPT | Mod: CPTII,,, | Performed by: PHYSICIAN ASSISTANT

## 2023-08-07 PROCEDURE — 99213 OFFICE O/P EST LOW 20 MIN: CPT | Mod: PBBFAC | Performed by: PHYSICIAN ASSISTANT

## 2023-08-07 PROCEDURE — 99999 PR PBB SHADOW E&M-EST. PATIENT-LVL III: ICD-10-PCS | Mod: PBBFAC,,, | Performed by: PHYSICIAN ASSISTANT

## 2023-08-07 PROCEDURE — 3008F BODY MASS INDEX DOCD: CPT | Mod: CPTII,,, | Performed by: PHYSICIAN ASSISTANT

## 2023-08-07 PROCEDURE — 1160F PR REVIEW ALL MEDS BY PRESCRIBER/CLIN PHARMACIST DOCUMENTED: ICD-10-PCS | Mod: CPTII,,, | Performed by: PHYSICIAN ASSISTANT

## 2023-08-07 PROCEDURE — 1159F MED LIST DOCD IN RCRD: CPT | Mod: CPTII,,, | Performed by: PHYSICIAN ASSISTANT

## 2023-08-07 PROCEDURE — 3008F PR BODY MASS INDEX (BMI) DOCUMENTED: ICD-10-PCS | Mod: CPTII,,, | Performed by: PHYSICIAN ASSISTANT

## 2023-08-07 PROCEDURE — 99999 PR PBB SHADOW E&M-EST. PATIENT-LVL III: CPT | Mod: PBBFAC,,, | Performed by: PHYSICIAN ASSISTANT

## 2023-08-07 PROCEDURE — 99024 POSTOP FOLLOW-UP VISIT: CPT | Mod: ,,, | Performed by: PHYSICIAN ASSISTANT

## 2023-08-07 PROCEDURE — 99024 PR POST-OP FOLLOW-UP VISIT: ICD-10-PCS | Mod: ,,, | Performed by: PHYSICIAN ASSISTANT

## 2023-08-07 RX ORDER — HYDROCODONE BITARTRATE AND ACETAMINOPHEN 5; 325 MG/1; MG/1
1 TABLET ORAL EVERY 6 HOURS PRN
Qty: 24 TABLET | Refills: 0 | Status: SHIPPED | OUTPATIENT
Start: 2023-08-07

## 2023-08-07 NOTE — PROGRESS NOTES
[facility]   Physical Therapy Treatment Note      Name: Miriam Jett  Clinic Number: [id number]    Therapy Diagnosis:   Encounter Diagnosis   Name Primary?    Adhesive capsulitis of left shoulder Yes     Physician: Michael Ramirez    Visit Date: 8/7/2023    Physician Orders: PT Eval and Treat   Medical Diagnosis from Referral: S/P shoulder hemiarthroplasty, left Adhesive capsulitis of left shoulder (past total shoulder LUE)  Evaluation Date: 7/26/2023  Authorization Period Expiration: 6/22/2023  Plan of Care Expiration: 10/4/2023  Progress Note Due: 10th visit  Visit # / Visits authorized: 3/ 12   FOTO: 1/ 3      Precautions: Standard              Sling for 2-6 weeks when not completing HEP-discontinued  Per MD note:  RASHAUN/DEDRICK protocol  WBAT LUE in sling  Ok to be out of sling for pendulums, elbow, wrist ROM     Time In: 1:15  Time Out: 2:15  Total Appointment Time (timed & untimed codes): 60 minutes  PTA Visit #: 0/5       Subjective     Pt reports: transportation difficulties limited her availability  She was compliant with home exercise program.  Response to previous treatment: no pain  Functional change: minimal ROM loss    Pain: 0/10  Location: left shoulder      Objective      Objective Measures updated at progress report unless specified.     Treatment     Miriam received the treatments listed below:      therapeutic exercises, neuro reeducation to develop strength, ROM, flexibility, and posture for 60 minutes including:  Manual joint mobilizations  AAROM all planes standing and supine  AROM all planes supine and standing  Postural reeducation for scapula retraction to avoid shoulder elevation substitution  PROM all planes  SH IR/ER/extension with yellow band with cues for scapula stabilization  ROWS  D2 flexion   Ball roll on wall    Patient Education and Home Exercises       Education provided:   - HEP    Written Home Exercises Provided: Patient instructed to  cont prior HEP. Exercises were reviewed and Miriam was able to demonstrate them prior to the end of the session.  Miriam demonstrated good  understanding of the education provided. See EMR under Patient Instructions for exercises provided during therapy sessions    Assessment     Miriam presents with increased tightness and minimal ROM loss compared to last visit due to inability to attend scheduled sessions due to transportation deficits. Responded well to PROM and AAROM allowing for minimal improvements. Requires further PT to improve scapula dyskinesia. Encouraged her to increase ROM frequency.     Miriam Is progressing well towards her goals.   Pt prognosis is Excellent.     Pt will continue to benefit from skilled outpatient physical therapy to address the deficits listed in the problem list box on initial evaluation, provide pt/family education and to maximize pt's level of independence in the home and community environment.     Pt's spiritual, cultural and educational needs considered and pt agreeable to plan of care and goals.     Anticipated barriers to physical therapy: none    Goals:   Short Term Goals: In 6 weeks   1. Patient  to be educated on HEP.  2.Patient to increase shoulder AROM to 110 degrees of flexion and scaption, in order to improve function of involved UE.  3.Patient to increase strength to 3+/5, in order to improve endurance with activity.  4.Patient to have pain less than 2/10 at worst, in order to improve QOL.  5.Patient to improve score on the FOTO, in order to improve QOL.   6. Pt to improve functional IR/ER to be independent with washing body and hair, dressing and grooming tasks.      Long Term Goals: In 10 weeks  1. Patient to improve score on the FOTO to 30% or less, in order to improve QOL.  2. Patient to demo increase in UE strength to 4+/5, in order to improve endurance with activity.  3. Patient to have decreased pain to 2/10 at worst, in order to improve QOL.  4.  Patient to demo increase shoulder AROM to WFL, in order to improve function of involved UE.  5. Patient to perform daily activities including brushing her hair, lifting light weight items overhead, and be independent with household chores without increased symptoms    Plan     Plan of care Certification: 7/26/2023 to 10/4/2023.     Outpatient Physical Therapy 2-3 times weekly for 10 weeks to include the following interventions: Electrical Stimulation prn, Manual Therapy, Moist Heat/ Ice, Neuromuscular Re-ed, Orthotic Management and Training, Patient Education, Self Care, Therapeutic Activities, and Therapeutic Exercise.     Jacques Felix, PT

## 2023-08-07 NOTE — PROGRESS NOTES
Orthopaedics  Post-operative follow-up    Procedure Performed:   1. Left shoulder arthroscopic lysis of adhesions and manipulation under anesthesia    Date of Surgery: 7/24/2023    Subjective: Miriam Jett is now almost 2 weeks out from her shoulder surgery.  She is doing well with no specific complaints other than the expected post-operative pain and stiffness.  She has been compliant with post-operative restrictions. She has began PT at [facility].       Exam:  Sutures removed, C/D/I, incision sites benign with no drainage or redness  Mild bruising as anticipated  ROM within functional limits  exhibits full passive forward elevation, abduction, ER and IR  Axillary nerve sensation and motor intact  Motor and sensory intact distally  Strong radial pulse, fingers warm and well perfused    Imaging:  No new imaging    Impression:  Status post left shoulder arthroscopic lysis of adhesions and manipulation under anesthesia , initial post-operative visit - doing well    Plan:  Discussed surgical findings, operative procedure, reviewed intra op imaging  She is able to achieve almost full passive ROM  She exhibits slight limitations in FF and ER but with gravity assistance she was able to reach normal functional limits, , ER 50  Encouraged to continue to be active in PT and participate in home exercises at least 4-5 times a day to maintain ROM  Reviewed post-operative instructions, restrictions, and rehabilitation  Provided PT script and protocol  Refilled post op pain meds, switched from oxy 5 to norco 5  Symptomatic treatment for pain / swelling  Instructed patient to call clinic if questions or concerns      Follow-up in 1 month with Dr. Ramirez at 6 weeks post-op    Work status:  For weeks 0-4 from now:  1) Sling immobilization at all times, one armed work (other than typing)  2) Allow time for physical therapy    For weeks 4-8 from now:  1) Discontinue sling  2) Continue physical therapy  3) No  lifting/pushing/pulling greater than 2 pounds  4) No overhead work  5) No repetitive motions        Monique Saha PA-C  Sports Medicine Physician Assistant       Disclaimer: This note was prepared using a voice recognition system and is likely to have sound alike errors within the text.

## 2023-08-14 ENCOUNTER — PATIENT MESSAGE (OUTPATIENT)
Dept: REHABILITATION | Facility: HOSPITAL | Age: 28
End: 2023-08-14

## 2023-08-15 ENCOUNTER — PATIENT MESSAGE (OUTPATIENT)
Dept: SPORTS MEDICINE | Facility: CLINIC | Age: 28
End: 2023-08-15
Payer: MEDICAID

## 2023-08-15 DIAGNOSIS — Z96.612 S/P SHOULDER HEMIARTHROPLASTY, LEFT: Primary | ICD-10-CM

## 2023-08-23 ENCOUNTER — PATIENT MESSAGE (OUTPATIENT)
Dept: SPORTS MEDICINE | Facility: CLINIC | Age: 28
End: 2023-08-23
Payer: MEDICAID

## 2023-08-23 ENCOUNTER — TELEPHONE (OUTPATIENT)
Dept: SPORTS MEDICINE | Facility: CLINIC | Age: 28
End: 2023-08-23
Payer: MEDICAID

## 2023-08-25 ENCOUNTER — CLINICAL SUPPORT (OUTPATIENT)
Dept: REHABILITATION | Facility: HOSPITAL | Age: 28
End: 2023-08-25
Payer: MEDICAID

## 2023-08-25 DIAGNOSIS — M75.02 ADHESIVE CAPSULITIS OF LEFT SHOULDER: Primary | ICD-10-CM

## 2023-08-25 PROCEDURE — 97110 THERAPEUTIC EXERCISES: CPT | Mod: PN

## 2023-08-25 NOTE — PROGRESS NOTES
[facility]   Physical Therapy Treatment Note      Name: Miriam Jett  Clinic Number: [id number]    Therapy Diagnosis:   Encounter Diagnosis   Name Primary?    Adhesive capsulitis of left shoulder Yes     Physician: Michael Ramirez    Visit Date: 8/25/2023    Physician Orders: PT Eval and Treat   Medical Diagnosis from Referral: S/P shoulder hemiarthroplasty, left Adhesive capsulitis of left shoulder (past total shoulder LUE)  Evaluation Date: 7/26/2023  Authorization Period Expiration: 6/22/2023  Plan of Care Expiration: 10/4/2023  Progress Note Due: 10th visit  Visit # / Visits authorized: 4/ 12   FOTO: 1/ 3      Precautions: Standard              Sling for 2-6 weeks when not completing HEP-discontinued  Per MD note:  RASHAUN/DEDRICK protocol  WBAT LUE in sling  Ok to be out of sling for pendulums, elbow, wrist ROM     Time In: 10:15  Time Out: 11:15  Total Appointment Time (timed & untimed codes): 60 minutes  PTA Visit #: 0/5       Subjective     Pt reports: going on vacation is reason for not attending PT  She was compliant with home exercise program.  Response to previous treatment: no pain  Functional change: decreased ROM  Pain: 0/10  Location: left shoulder      Objective      Patient presents in no distress however has significant A/PROM loss since previous visit with noted scapula dyskinesia.     Left shoulder PROM:  Flexion  148  Abduction 90 without substitution  ER at 45 45  IR  65    Treatment     Miriam received the treatments listed below:      therapeutic exercises, neuro reeducation, manual to develop strength, ROM, flexibility, and posture for 60 minutes including:  Manual joint mobilizations  AAROM all planes standing and supine  AROM all planes supine and standing  Postural reeducation for scapula retraction to avoid shoulder elevation substitution  PROM all planes  SH ER/extension with yellow band with cues for scapula stabilization  Shoulder flexion  bilaterally with slight abduction resistance  SL ABD  SL ER  JUDE sh ER with retraction hold  Horizontal ABD standing and prone  Ball roll on wall    Patient Education and Home Exercises       Education provided:   - HEP    Written Home Exercises Provided: Patient instructed to cont prior HEP. Exercises were reviewed and Miriam was able to demonstrate them prior to the end of the session.  Miriam demonstrated good  understanding of the education provided. See EMR under Patient Instructions for exercises provided during therapy sessions    Assessment     Miriam presents with decreased shoulder ROM and scapula dyskinesia since previous visit. She is aware of importance of PT as well as increased frequency of HEP. Has noted shoulder elevation substitution. Capsular pattern is more evident.     Miriam Is progressing well towards her goals.   Pt prognosis is Excellent.     Pt will continue to benefit from skilled outpatient physical therapy to address the deficits listed in the problem list box on initial evaluation, provide pt/family education and to maximize pt's level of independence in the home and community environment.     Pt's spiritual, cultural and educational needs considered and pt agreeable to plan of care and goals.     Anticipated barriers to physical therapy: none    Goals:   Short Term Goals: In 6 weeks   1. Patient  to be educated on HEP.  2.Patient to increase shoulder AROM to 110 degrees of flexion and scaption, in order to improve function of involved UE.  3.Patient to increase strength to 3+/5, in order to improve endurance with activity.  4.Patient to have pain less than 2/10 at worst, in order to improve QOL.  5.Patient to improve score on the FOTO, in order to improve QOL.   6. Pt to improve functional IR/ER to be independent with washing body and hair, dressing and grooming tasks.      Long Term Goals: In 10 weeks  1. Patient to improve score on the FOTO to 30% or less, in order to improve  QOL.  2. Patient to demo increase in UE strength to 4+/5, in order to improve endurance with activity.  3. Patient to have decreased pain to 2/10 at worst, in order to improve QOL.  4. Patient to demo increase shoulder AROM to WFL, in order to improve function of involved UE.  5. Patient to perform daily activities including brushing her hair, lifting light weight items overhead, and be independent with household chores without increased symptoms    Plan     Plan of care Certification: 7/26/2023 to 10/4/2023.     Outpatient Physical Therapy 2-3 times weekly for 10 weeks to include the following interventions: Electrical Stimulation prn, Manual Therapy, Moist Heat/ Ice, Neuromuscular Re-ed, Orthotic Management and Training, Patient Education, Self Care, Therapeutic Activities, and Therapeutic Exercise.     Jacques Felix, PT

## 2023-09-05 ENCOUNTER — PATIENT MESSAGE (OUTPATIENT)
Dept: SPORTS MEDICINE | Facility: CLINIC | Age: 28
End: 2023-09-05
Payer: MEDICAID

## 2023-09-07 ENCOUNTER — PATIENT MESSAGE (OUTPATIENT)
Dept: SURGERY | Facility: HOSPITAL | Age: 28
End: 2023-09-07
Payer: MEDICAID

## 2023-09-13 ENCOUNTER — PATIENT MESSAGE (OUTPATIENT)
Dept: SPORTS MEDICINE | Facility: CLINIC | Age: 28
End: 2023-09-13

## 2023-09-15 ENCOUNTER — OFFICE VISIT (OUTPATIENT)
Dept: SPORTS MEDICINE | Facility: CLINIC | Age: 28
End: 2023-09-15
Payer: MEDICAID

## 2023-09-15 VITALS — BODY MASS INDEX: 26.75 KG/M2 | HEIGHT: 67 IN | WEIGHT: 170.44 LBS

## 2023-09-15 DIAGNOSIS — Z96.612 S/P SHOULDER HEMIARTHROPLASTY, LEFT: ICD-10-CM

## 2023-09-15 DIAGNOSIS — M75.02 ADHESIVE CAPSULITIS OF LEFT SHOULDER: ICD-10-CM

## 2023-09-15 DIAGNOSIS — Z98.890 STATUS POST ARTHROSCOPY OF LEFT SHOULDER: Primary | ICD-10-CM

## 2023-09-15 PROCEDURE — 99999 PR PBB SHADOW E&M-EST. PATIENT-LVL III: CPT | Mod: PBBFAC,,, | Performed by: STUDENT IN AN ORGANIZED HEALTH CARE EDUCATION/TRAINING PROGRAM

## 2023-09-15 PROCEDURE — 1159F PR MEDICATION LIST DOCUMENTED IN MEDICAL RECORD: ICD-10-PCS | Mod: CPTII,,, | Performed by: STUDENT IN AN ORGANIZED HEALTH CARE EDUCATION/TRAINING PROGRAM

## 2023-09-15 PROCEDURE — 99213 OFFICE O/P EST LOW 20 MIN: CPT | Mod: PBBFAC | Performed by: STUDENT IN AN ORGANIZED HEALTH CARE EDUCATION/TRAINING PROGRAM

## 2023-09-15 PROCEDURE — 99024 PR POST-OP FOLLOW-UP VISIT: ICD-10-PCS | Mod: ,,, | Performed by: STUDENT IN AN ORGANIZED HEALTH CARE EDUCATION/TRAINING PROGRAM

## 2023-09-15 PROCEDURE — 1160F PR REVIEW ALL MEDS BY PRESCRIBER/CLIN PHARMACIST DOCUMENTED: ICD-10-PCS | Mod: CPTII,,, | Performed by: STUDENT IN AN ORGANIZED HEALTH CARE EDUCATION/TRAINING PROGRAM

## 2023-09-15 PROCEDURE — 1159F MED LIST DOCD IN RCRD: CPT | Mod: CPTII,,, | Performed by: STUDENT IN AN ORGANIZED HEALTH CARE EDUCATION/TRAINING PROGRAM

## 2023-09-15 PROCEDURE — 1160F RVW MEDS BY RX/DR IN RCRD: CPT | Mod: CPTII,,, | Performed by: STUDENT IN AN ORGANIZED HEALTH CARE EDUCATION/TRAINING PROGRAM

## 2023-09-15 PROCEDURE — 3008F PR BODY MASS INDEX (BMI) DOCUMENTED: ICD-10-PCS | Mod: CPTII,,, | Performed by: STUDENT IN AN ORGANIZED HEALTH CARE EDUCATION/TRAINING PROGRAM

## 2023-09-15 PROCEDURE — 99024 POSTOP FOLLOW-UP VISIT: CPT | Mod: ,,, | Performed by: STUDENT IN AN ORGANIZED HEALTH CARE EDUCATION/TRAINING PROGRAM

## 2023-09-15 PROCEDURE — 99999 PR PBB SHADOW E&M-EST. PATIENT-LVL III: ICD-10-PCS | Mod: PBBFAC,,, | Performed by: STUDENT IN AN ORGANIZED HEALTH CARE EDUCATION/TRAINING PROGRAM

## 2023-09-15 PROCEDURE — 3008F BODY MASS INDEX DOCD: CPT | Mod: CPTII,,, | Performed by: STUDENT IN AN ORGANIZED HEALTH CARE EDUCATION/TRAINING PROGRAM

## 2023-09-15 NOTE — PROGRESS NOTES
Orthopaedics  Post-operative follow-up    Procedure Performed:  1. Left shoulder arthroscopic lysis of adhesions and manipulation under anesthesia    Date of Surgery: 7/24/23    Subjective: Miriam Jett is now approximately 7 weeks out from her shoulder surgery.  She has been compliant with post-operative restrictions and has been progressing with PT.  Her pain and function continue to improve. She has not been able to consistently attend physical therapy due to transportation issues.     Exam:  Incision sites healed, C/D/I  No swelling or bruising noted  Fluid ROM with no crepitus  Active ROM: , ABD 90, ER 30  Cuff strength intact on limited testing   Axillary nerve sensation and motor intact  Motor and sensory intact distally  Strong radial pulse, fingers warm and well perfused    Imaging:  No new imaging.     Impression:  Status post left shoulder arthroscopic lysis of adhesions and manipulation under anesthesia, second post-operative visit - doing well    Plan:  She is making progress and is much improved from preop. However she still has some limitations with ROM and I advised her to continue to stick with PT to make sure she gets it all back.  Advance PT per protocol  Symptomatic treatment for pain / swelling  May advance activities as reviewed today: Discontinue sling, initiate progression of AAROM and AROM.   Instructed patient to call clinic if questions or concerns    Follow-up in 6 weeks at 3 months post-op (around 10/24/23)    Work status:  For weeks 0-6 from now:  1) Discontinue sling  2) Continue physical therapy  3) No lifting/pushing/pulling greater than 2 pounds  4) No overhead work  5) No repetitive motions        Michael Ramirez MD    I, Sydni Rodriguez, acted as a scribe for Michael Ramirez MD for the duration of this office visit.

## 2023-10-02 ENCOUNTER — PATIENT MESSAGE (OUTPATIENT)
Dept: SPORTS MEDICINE | Facility: CLINIC | Age: 28
End: 2023-10-02
Payer: MEDICAID

## 2023-10-02 DIAGNOSIS — Z96.612 S/P SHOULDER HEMIARTHROPLASTY, LEFT: ICD-10-CM

## 2023-10-02 DIAGNOSIS — M75.02 ADHESIVE CAPSULITIS OF LEFT SHOULDER: ICD-10-CM

## 2023-10-02 DIAGNOSIS — Z98.890 STATUS POST ARTHROSCOPY OF LEFT SHOULDER: Primary | ICD-10-CM

## 2023-12-21 ENCOUNTER — HOSPITAL ENCOUNTER (EMERGENCY)
Facility: HOSPITAL | Age: 28
Discharge: HOME | End: 2023-12-21
Attending: EMERGENCY MEDICINE
Payer: MEDICAID

## 2023-12-21 VITALS
HEART RATE: 66 BPM | TEMPERATURE: 98 F | SYSTOLIC BLOOD PRESSURE: 108 MMHG | RESPIRATION RATE: 19 BRPM | DIASTOLIC BLOOD PRESSURE: 58 MMHG | BODY MASS INDEX: 26.55 KG/M2 | WEIGHT: 169.56 LBS | OXYGEN SATURATION: 98 %

## 2023-12-21 DIAGNOSIS — R10.30 LOWER ABDOMINAL PAIN: Primary | ICD-10-CM

## 2023-12-21 DIAGNOSIS — N93.9 VAGINAL SPOTTING: ICD-10-CM

## 2023-12-21 LAB
ALBUMIN SERPL BCP-MCNC: 4.2 G/DL (ref 3.5–5.2)
ALP SERPL-CCNC: 61 U/L (ref 55–135)
ALT SERPL W/O P-5'-P-CCNC: 10 U/L (ref 10–44)
ANION GAP SERPL CALC-SCNC: 10 MMOL/L (ref 8–16)
AST SERPL-CCNC: 13 U/L (ref 10–40)
B-HCG UR QL: NEGATIVE
BACTERIA #/AREA URNS HPF: NORMAL /HPF
BASOPHILS # BLD AUTO: 0.03 K/UL (ref 0–0.2)
BASOPHILS NFR BLD: 0.4 % (ref 0–1.9)
BILIRUB SERPL-MCNC: 0.4 MG/DL (ref 0.1–1)
BILIRUB UR QL STRIP: NEGATIVE
BUN SERPL-MCNC: 11 MG/DL (ref 6–20)
CALCIUM SERPL-MCNC: 9.7 MG/DL (ref 8.7–10.5)
CHLORIDE SERPL-SCNC: 106 MMOL/L (ref 95–110)
CLARITY UR: CLEAR
CO2 SERPL-SCNC: 23 MMOL/L (ref 23–29)
COLOR UR: YELLOW
CREAT SERPL-MCNC: 0.8 MG/DL (ref 0.5–1.4)
DIFFERENTIAL METHOD: ABNORMAL
EOSINOPHIL # BLD AUTO: 0 K/UL (ref 0–0.5)
EOSINOPHIL NFR BLD: 0.6 % (ref 0–8)
ERYTHROCYTE [DISTWIDTH] IN BLOOD BY AUTOMATED COUNT: 12.2 % (ref 11.5–14.5)
EST. GFR  (NO RACE VARIABLE): >60 ML/MIN/1.73 M^2
GLUCOSE SERPL-MCNC: 84 MG/DL (ref 70–110)
GLUCOSE UR QL STRIP: NEGATIVE
HCG INTACT+B SERPL-ACNC: <1.2 MIU/ML
HCT VFR BLD AUTO: 33.2 % (ref 37–48.5)
HGB BLD-MCNC: 11.5 G/DL (ref 12–16)
HGB UR QL STRIP: ABNORMAL
IMM GRANULOCYTES # BLD AUTO: 0.02 K/UL (ref 0–0.04)
IMM GRANULOCYTES NFR BLD AUTO: 0.3 % (ref 0–0.5)
KETONES UR QL STRIP: NEGATIVE
LEUKOCYTE ESTERASE UR QL STRIP: ABNORMAL
LYMPHOCYTES # BLD AUTO: 1.9 K/UL (ref 1–4.8)
LYMPHOCYTES NFR BLD: 26.6 % (ref 18–48)
MCH RBC QN AUTO: 30.5 PG (ref 27–31)
MCHC RBC AUTO-ENTMCNC: 34.6 G/DL (ref 32–36)
MCV RBC AUTO: 88 FL (ref 82–98)
MICROSCOPIC COMMENT: NORMAL
MONOCYTES # BLD AUTO: 0.5 K/UL (ref 0.3–1)
MONOCYTES NFR BLD: 6.8 % (ref 4–15)
NEUTROPHILS # BLD AUTO: 4.7 K/UL (ref 1.8–7.7)
NEUTROPHILS NFR BLD: 65.3 % (ref 38–73)
NITRITE UR QL STRIP: NEGATIVE
NRBC BLD-RTO: 0 /100 WBC
PH UR STRIP: 6 [PH] (ref 5–8)
PLATELET # BLD AUTO: 234 K/UL (ref 150–450)
PMV BLD AUTO: 11.1 FL (ref 9.2–12.9)
POTASSIUM SERPL-SCNC: 4.1 MMOL/L (ref 3.5–5.1)
PROT SERPL-MCNC: 7.4 G/DL (ref 6–8.4)
PROT UR QL STRIP: NEGATIVE
RBC # BLD AUTO: 3.77 M/UL (ref 4–5.4)
RBC #/AREA URNS HPF: 1 /HPF (ref 0–4)
SODIUM SERPL-SCNC: 139 MMOL/L (ref 136–145)
SP GR UR STRIP: 1.01 (ref 1–1.03)
SQUAMOUS #/AREA URNS HPF: 5 /HPF
URN SPEC COLLECT METH UR: ABNORMAL
UROBILINOGEN UR STRIP-ACNC: NEGATIVE EU/DL
WBC # BLD AUTO: 7.18 K/UL (ref 3.9–12.7)
WBC #/AREA URNS HPF: 5 /HPF (ref 0–5)

## 2023-12-21 PROCEDURE — 84702 CHORIONIC GONADOTROPIN TEST: CPT | Performed by: REGISTERED NURSE

## 2023-12-21 PROCEDURE — 99283 EMERGENCY DEPT VISIT LOW MDM: CPT

## 2023-12-21 PROCEDURE — 25000003 PHARM REV CODE 250: Performed by: REGISTERED NURSE

## 2023-12-21 PROCEDURE — 85025 COMPLETE CBC W/AUTO DIFF WBC: CPT | Performed by: REGISTERED NURSE

## 2023-12-21 PROCEDURE — 81025 URINE PREGNANCY TEST: CPT | Performed by: REGISTERED NURSE

## 2023-12-21 PROCEDURE — 80053 COMPREHEN METABOLIC PANEL: CPT | Performed by: REGISTERED NURSE

## 2023-12-21 PROCEDURE — 81000 URINALYSIS NONAUTO W/SCOPE: CPT | Performed by: REGISTERED NURSE

## 2023-12-21 RX ORDER — ONDANSETRON 4 MG/1
4 TABLET, ORALLY DISINTEGRATING ORAL
Status: COMPLETED | OUTPATIENT
Start: 2023-12-21 | End: 2023-12-21

## 2023-12-21 RX ORDER — ACETAMINOPHEN 325 MG/1
650 TABLET ORAL
Status: COMPLETED | OUTPATIENT
Start: 2023-12-21 | End: 2023-12-21

## 2023-12-21 RX ADMIN — ONDANSETRON 4 MG: 4 TABLET, ORALLY DISINTEGRATING ORAL at 05:12

## 2023-12-21 RX ADMIN — ACETAMINOPHEN 650 MG: 325 TABLET ORAL at 05:12

## 2023-12-21 NOTE — FIRST PROVIDER EVALUATION
Medical screening examination initiated.  I have conducted a focused provider triage encounter, findings are as follows:    Brief history of present illness:  Lower abdominal pain/vaginal bleeding + preg test at home    There were no vitals filed for this visit.    Pertinent physical exam:  No acute distress, VS stable    Brief workup plan:  Workup    Preliminary workup initiated; this workup will be continued and followed by the physician or advanced practice provider that is assigned to the patient when roomed.

## 2023-12-21 NOTE — ED PROVIDER NOTES
SCRIBE #1 NOTE: ISimeon, am scribing for, and in the presence of, Bryanna Gamino MD. I have scribed the entire note.       History     Chief Complaint   Patient presents with    Abdominal Pain     Pt c/o lower abdominal pain with nausea and spotting. Positive pregnancy test today.      Review of patient's allergies indicates:  No Known Allergies      History of Present Illness     HPI    12/21/2023, 5:38 PM  History obtained from the patient      History of Present Illness: Miriam Jett is a 28 y.o. female patient with a PMHx of miscarriage who presents to the Emergency Department for evaluation of lower abd pain which onset gradually since Monday. Pt noticed pinkish-brown spotting this morning. Pt tested positive for pregnancy today at 3:00 PM. Pt's last cycle was November 25th. Symptoms are constant and moderate in severity. No mitigating or exacerbating factors reported. Associated sxs include nausea. Patient denies any emesis, fever, back pain, dizziness, and all other sxs at this time. No prior Tx. No further complaints or concerns at this time.       Arrival mode: Personal vehicle     PCP: Benjamin Yarbrough MD        Past Medical History:  Past Medical History:   Diagnosis Date    Miscarriage        Past Surgical History:  Past Surgical History:   Procedure Laterality Date    BONE GRAFT Left 7/13/2021    Procedure: BONE GRAFT;  Surgeon: Spenser Krishnamurthy MD;  Location: [facility] [place] OR;  Service: Orthopedics;  Laterality: Left;  pro-dense    CORE DECOMPRESSION OF HEAD OF FEMUR Left 7/13/2021    Procedure: CORE DECOMPRESSION, FEMUR, HEAD;  Surgeon: Spenser Krishnamurthy MD;  Location: [facility] [place] OR;  Service: Orthopedics;  Laterality: Left;    HIP ARTHROPLASTY Right 10/6/2021    Procedure: ARTHROPLASTY, HIP;  Surgeon: Spenser Krishnamurthy MD;  Location: [facility] [place] OR;  Service: Orthopedics;  Laterality: Right;    INJECTION OF JOINT Right 7/12/2021    Procedure: right IA Hip Joint injection- per Dr Ruelas;   Surgeon: Juan David Ho MD;  Location: [facility];  Service: Pain Management;  Laterality: Right;    LYSIS, ADHESIONS, SHOULDER, ARTHROSCOPIC Left 7/24/2023    Procedure: LYSIS,ADHESIONS,SHOULDER,ARTHROSCOPIC;  Surgeon: Michael Ramirez MD;  Location: [facility] OR;  Service: Orthopedics;  Laterality: Left;    MANIPULATION OF SHOULDER JOINT Left 7/24/2023    Procedure: MANIPULATION, SHOULDER;  Surgeon: Michael Ramirez MD;  Location: [facility] OR;  Service: Orthopedics;  Laterality: Left;    PARTIAL ARTHROPLASTY OF SHOULDER Left 6/16/2022    Procedure: HEMIARTHROPLASTY, SHOULDER;  Surgeon: Michael Ramirez MD;  Location: [facility] OR;  Service: Orthopedics;  Laterality: Left;         Family History:  Family History   Problem Relation Age of Onset    Birth defects Neg Hx        Social History:  Social History     Tobacco Use    Smoking status: Never     Passive exposure: Never    Smokeless tobacco: Never   Substance and Sexual Activity    Alcohol use: Yes     Alcohol/week: 1.0 standard drink of alcohol     Types: 1 Glasses of wine per week     Comment: Occas    Drug use: Not Currently     Types: Marijuana    Sexual activity: Yes     Partners: Male     Birth control/protection: None        Review of Systems     Review of Systems   Constitutional:  Negative for chills and fever.   HENT:  Negative for congestion and sore throat.    Respiratory:  Negative for cough and shortness of breath.    Cardiovascular:  Negative for chest pain.   Gastrointestinal:  Positive for abdominal pain (lower) and nausea. Negative for vomiting.   Genitourinary:  Negative for dysuria.        (+) pinkish brown spotting   Musculoskeletal:  Negative for back pain.   Skin:  Negative for rash.   Neurological:  Negative for dizziness, weakness and headaches.   Hematological:  Does not bruise/bleed easily.   All other systems reviewed and are negative.       Physical Exam     Initial Vitals [12/21/23 1651]   BP Pulse Resp Temp SpO2   (!)  149/66 76 18 98.1 °F (36.7 °C) 97 %      MAP       --          Physical Exam  Nursing Notes and Vital Signs Reviewed.  Constitutional: Patient is in no acute distress. Well-developed and well-nourished.  Head: Atraumatic. Normocephalic.  Eyes: PERRL. EOM intact. Conjunctivae are not pale. No scleral icterus.  ENT: Mucous membranes are moist. Oropharynx is clear and symmetric.    Neck: Supple. Full ROM. No lymphadenopathy.  Cardiovascular: Regular rate. Regular rhythm. No murmurs, rubs, or gallops. Distal pulses are 2+ and symmetric.  Pulmonary/Chest: No respiratory distress. Clear to auscultation bilaterally. No wheezing or rales.  Abdominal: Soft and non-distended.  There is lower abd tenderness.  No rebound, guarding, or rigidity. Good bowel sounds.  Genitourinary: No CVA tenderness  Musculoskeletal: Moves all extremities. No obvious deformities. No edema. No calf tenderness.  Skin: Warm and dry.  Neurological:  Alert, awake, and appropriate.  Normal speech.  No acute focal neurological deficits are appreciated.  Psychiatric: Normal affect. Good eye contact. Appropriate in content.     ED Course   Procedures  ED Vital Signs:  Vitals:    12/21/23 1651 12/21/23 1727 12/21/23 1732 12/21/23 1802   BP: (!) 149/66 (!) 119/56 (!) 117/59 (!) 108/58   Pulse: 76 76 72 66   Resp: 18 20 19 19   Temp: 98.1 °F (36.7 °C)      TempSrc: Oral      SpO2: 97%   98%   Weight: 76.9 kg (169 lb 8.5 oz)          Abnormal Lab Results:  Labs Reviewed   CBC W/ AUTO DIFFERENTIAL - Abnormal; Notable for the following components:       Result Value    RBC 3.77 (*)     Hemoglobin 11.5 (*)     Hematocrit 33.2 (*)     All other components within normal limits    Narrative:     Release to patient->Immediate   URINALYSIS, REFLEX TO URINE CULTURE - Abnormal; Notable for the following components:    Occult Blood UA 3+ (*)     Leukocytes, UA Trace (*)     All other components within normal limits    Narrative:     Specimen Source->Urine   COMPREHENSIVE  METABOLIC PANEL    Narrative:     Release to patient->Immediate   HCG, QUANTITATIVE    Narrative:     Release to patient->Immediate   URINALYSIS MICROSCOPIC    Narrative:     Specimen Source->Urine   PREGNANCY TEST, URINE RAPID   PREGNANCY TEST, URINE RAPID    Narrative:     Specimen Source->Urine        All Lab Results:  Results for orders placed or performed during the hospital encounter of 12/21/23   CBC auto differential   Result Value Ref Range    WBC 7.18 3.90 - 12.70 K/uL    RBC 3.77 (L) 4.00 - 5.40 M/uL    Hemoglobin 11.5 (L) 12.0 - 16.0 g/dL    Hematocrit 33.2 (L) 37.0 - 48.5 %    MCV 88 82 - 98 fL    MCH 30.5 27.0 - 31.0 pg    MCHC 34.6 32.0 - 36.0 g/dL    RDW 12.2 11.5 - 14.5 %    Platelets 234 150 - 450 K/uL    MPV 11.1 9.2 - 12.9 fL    Immature Granulocytes 0.3 0.0 - 0.5 %    Gran # (ANC) 4.7 1.8 - 7.7 K/uL    Immature Grans (Abs) 0.02 0.00 - 0.04 K/uL    Lymph # 1.9 1.0 - 4.8 K/uL    Mono # 0.5 0.3 - 1.0 K/uL    Eos # 0.0 0.0 - 0.5 K/uL    Baso # 0.03 0.00 - 0.20 K/uL    nRBC 0 0 /100 WBC    Gran % 65.3 38.0 - 73.0 %    Lymph % 26.6 18.0 - 48.0 %    Mono % 6.8 4.0 - 15.0 %    Eosinophil % 0.6 0.0 - 8.0 %    Basophil % 0.4 0.0 - 1.9 %    Differential Method Automated    Comprehensive metabolic panel   Result Value Ref Range    Sodium 139 136 - 145 mmol/L    Potassium 4.1 3.5 - 5.1 mmol/L    Chloride 106 95 - 110 mmol/L    CO2 23 23 - 29 mmol/L    Glucose 84 70 - 110 mg/dL    BUN 11 6 - 20 mg/dL    Creatinine 0.8 0.5 - 1.4 mg/dL    Calcium 9.7 8.7 - 10.5 mg/dL    Total Protein 7.4 6.0 - 8.4 g/dL    Albumin 4.2 3.5 - 5.2 g/dL    Total Bilirubin 0.4 0.1 - 1.0 mg/dL    Alkaline Phosphatase 61 55 - 135 U/L    AST 13 10 - 40 U/L    ALT 10 10 - 44 U/L    eGFR >60 >60 mL/min/1.73 m^2    Anion Gap 10 8 - 16 mmol/L   Urinalysis, Reflex to Urine Culture Urine, Clean Catch    Specimen: Urine   Result Value Ref Range    Specimen UA Urine, Clean Catch     Color, UA Yellow Yellow, Straw, Nany    Appearance, UA Clear  Clear    pH, UA 6.0 5.0 - 8.0    Specific Gravity, UA 1.015 1.005 - 1.030    Protein, UA Negative Negative    Glucose, UA Negative Negative    Ketones, UA Negative Negative    Bilirubin (UA) Negative Negative    Occult Blood UA 3+ (A) Negative    Nitrite, UA Negative Negative    Urobilinogen, UA Negative <2.0 EU/dL    Leukocytes, UA Trace (A) Negative   hCG, quantitative, pregnancy   Result Value Ref Range    HCG Quant <1.2 See Text mIU/mL   Urinalysis Microscopic   Result Value Ref Range    RBC, UA 1 0 - 4 /hpf    WBC, UA 5 0 - 5 /hpf    Bacteria Occasional None-Occ /hpf    Squam Epithel, UA 5 /hpf    Microscopic Comment SEE COMMENT    Pregnancy, urine rapid   Result Value Ref Range    Preg Test, Ur Negative          Imaging Results:  Imaging Results    None                     The Emergency Provider reviewed the vital signs and test results, which are outlined above.     ED Discussion       6:12 PM: Reassessed pt at this time.  Discussed with pt all pertinent ED information and results. Discussed pt dx and plan of tx. Gave pt all f/u and return to the ED instructions. All questions and concerns were addressed at this time. Pt expresses understanding of information and instructions, and is comfortable with plan to discharge. Pt is stable for discharge.    I discussed with patient and/or family/caretaker that evaluation in the ED does not suggest any emergent or life threatening medical conditions requiring immediate intervention beyond what was provided in the ED, and I believe patient is safe for discharge.  Regardless, an unremarkable evaluation in the ED does not preclude the development or presence of a serious of life threatening condition. As such, patient was instructed to return immediately for any worsening or change in current symptoms.        Medical Decision Making  DDX:  1. UTI  2. Incidental pregnancy  3. DUB    Patient presents with lower abdominal pain, spotting, has not missed a cycle but feels  like her cycle came earlier than normal, reports positive home UPT, vital signs reviewed and stable, exam is normal, lab work reviewed and quant beta is negative, UPT negative, no concerns for infection, remainder of lab are normal likely dub, she is stable for discharge.     Amount and/or Complexity of Data Reviewed  Labs: ordered. Decision-making details documented in ED Course.                ED Medication(s):  Medications   acetaminophen tablet 650 mg (650 mg Oral Given 12/21/23 1713)   ondansetron disintegrating tablet 4 mg (4 mg Oral Given 12/21/23 1713)       Discharge Medication List as of 12/21/2023  6:12 PM           Follow-up Information       Benjamin Yarbrough MD. Schedule an appointment as soon as possible for a visit in 2 days.    Specialty: Internal Medicine  Why: Return to the Emergency Room, If symptoms worsen  Contact information:  [facility] [facility] [address]  [phone]                                 Scribe Attestation:   Scribe #1: I performed the above scribed service and the documentation accurately describes the services I performed. I attest to the accuracy of the note.     Attending:   Physician Attestation Statement for Scribe #1: I, Bryanna Gamino MD, personally performed the services described in this documentation, as scribed by Simeon Cruz, in my presence, and it is both accurate and complete.           Clinical Impression       ICD-10-CM ICD-9-CM   1. Lower abdominal pain  R10.30 789.09   2. Vaginal spotting  N93.9 623.8       Disposition:   Disposition: Discharged  Condition: Stable        Bryanna Gamino MD  12/24/23 1407

## 2024-03-27 ENCOUNTER — PATIENT MESSAGE (OUTPATIENT)
Dept: SPORTS MEDICINE | Facility: CLINIC | Age: 29
End: 2024-03-27
Payer: MEDICAID

## 2024-08-02 ENCOUNTER — E-VISIT (OUTPATIENT)
Dept: FAMILY MEDICINE | Facility: CLINIC | Age: 29
End: 2024-08-02
Payer: MEDICAID

## 2024-08-02 ENCOUNTER — PATIENT MESSAGE (OUTPATIENT)
Dept: FAMILY MEDICINE | Facility: CLINIC | Age: 29
End: 2024-08-02

## 2024-08-02 DIAGNOSIS — G89.29 CHRONIC RIGHT SHOULDER PAIN: Primary | ICD-10-CM

## 2024-08-02 DIAGNOSIS — M25.511 CHRONIC RIGHT SHOULDER PAIN: Primary | ICD-10-CM

## 2024-08-02 RX ORDER — TIZANIDINE 2 MG/1
2 TABLET ORAL EVERY 8 HOURS PRN
Qty: 60 TABLET | Refills: 1 | Status: SHIPPED | OUTPATIENT
Start: 2024-08-02 | End: 2024-09-01

## 2024-08-02 RX ORDER — MELOXICAM 15 MG/1
15 TABLET ORAL DAILY PRN
Qty: 30 TABLET | Refills: 0 | Status: SHIPPED | OUTPATIENT
Start: 2024-08-02 | End: 2024-09-01

## 2024-08-02 NOTE — PROGRESS NOTES
Patient ID: Miriam Jett is a 28 y.o. female.    Chief Complaint: Shoulder Pain          274}  The patient initiated a request through [employer] on 8/2/2024 for evaluation and management with a chief complaint of Shoulder Pain     I evaluated the questionnaire submission on 08/02/2024 .    Total Time (in minutes): 14     Ohs Peq Evisit General    8/2/2024  1:02 PM CDT - Filed by Patient   Do you agree to participate in an E-Visit? Yes   If you have any of the following symptoms, please present to your local emergency room or call 911:  I acknowledge   Are you pregnant, could you be pregnant, or are you breast feeding? None of the above   What is the main issue you would like addressed today? Pain in my right shoulder   Please describe your symptoms Lack of mobility   Where is your problem located? Roght shoulder   How severe are your symptoms? Mild   Have you had these symptoms before? Yes   How long have you been having these symptoms? For more than a month   Please list any medications or treatments you have used for your condition and indicate if it was effective or not. None   What makes this feel better? Relaxing my shoulder   What makes this feel worse? Moving it around   Are these symptoms related to a condition that you currently have? Yes   What is the condition?    When were you last seen for this condition? 8/16/2023   Please describe any probable cause for these symptoms Avascular necrosis   Provide any additional information you feel is important.    Please attach any relevant images or files    Are you able to take your vital signs? No          Active Problem List with Overview Notes    Diagnosis Date Noted    Adhesive capsulitis of left shoulder 07/26/2023    Muscle weakness of left upper extremity 02/21/2023    Iron deficiency 07/25/2022    Decreased range of motion of left shoulder 06/23/2022    Arthritis of right hip 10/06/2021    Avascular necrosis of bone of right hip 10/06/2021     Avascular necrosis of bone of left hip 07/13/2021    Hip pain 07/12/2021    Decreased strength of lower extremity 01/22/2021      Recent Labs Obtained:  Lab Results   Component Value Date    WBC 7.18 12/21/2023    HGB 11.5 (L) 12/21/2023    HCT 33.2 (L) 12/21/2023    MCV 88 12/21/2023     12/21/2023     12/21/2023    K 4.1 12/21/2023    GLU 84 12/21/2023    CREATININE 0.8 12/21/2023    EGFRNORACEVR >60 12/21/2023      Review of patient's allergies indicates:  No Known Allergies    Encounter Diagnosis   Name Primary?    Chronic right shoulder pain Yes        Orders Placed This Encounter   Procedures    X-Ray Shoulder Trauma 3 view Right     Standing Status:   Future     Standing Expiration Date:   8/2/2025     Order Specific Question:   May the Radiologist modify the order per protocol to meet the clinical needs of the patient?     Answer:   Yes     Order Specific Question:   Release to patient     Answer:   Immediate    Ambulatory referral/consult to Orthopedics     Standing Status:   Future     Standing Expiration Date:   9/2/2025     Referral Priority:   Routine     Referral Type:   Consultation     Requested Specialty:   Orthopedic Surgery     Number of Visits Requested:   1      Medications Ordered This Encounter   Medications    meloxicam (MOBIC) 15 MG tablet     Sig: Take 1 tablet (15 mg total) by mouth daily as needed for Pain.     Dispense:  30 tablet     Refill:  0    tiZANidine (ZANAFLEX) 2 MG tablet     Sig: Take 1 tablet (2 mg total) by mouth every 8 (eight) hours as needed (muscle spasms).     Dispense:  60 tablet     Refill:  1        E-Visit Time Tracking:    Day 1 Time (in minutes): 14    Total Time (in minutes): 14      274}

## 2024-08-05 ENCOUNTER — TELEPHONE (OUTPATIENT)
Dept: FAMILY MEDICINE | Facility: CLINIC | Age: 29
End: 2024-08-05
Payer: MEDICAID

## 2024-12-30 DIAGNOSIS — M25.511 RIGHT SHOULDER PAIN, UNSPECIFIED CHRONICITY: Primary | ICD-10-CM

## 2025-01-04 NOTE — PROGRESS NOTES
Virtual Orthopaedic Follow-Up Visit    Last Appointment: 9/15/23  Diagnosis: Status post left shoulder arthroscopic lysis of adhesions and manipulation under anesthesia   Prior Procedure: None      Miriam Jett is a 29 y.o. female who is here for f/u evaluation of her right shoulder. Patient has been previously seen by me for this and has known avascular necrosis. Previously her left shoulder was more bothersome and we left shoulder hemiarthroplasty was performed. The patient returns today reporting that right shoulder has been symptomatic over the past few months. She has had increased pain and mechanical symptoms.     To review her history, Miriam Jett is a 29 y.o. female who has known left shoulder avascular necrosis. She has undergone right MADHU and left shoulder hemiarthroplasty for the same issue.     Patient's medications, allergies, past medical, surgical, social and family histories were reviewed and updated as appropriate.    Review of Systems   All systems reviewed were negative.  Specifically, the patient denies fever, chills, weight loss, chest pain, shortness of breath, or dyspnea on exertion.      Past Medical History:   Diagnosis Date    Miscarriage        Past Surgical History:   Procedure Laterality Date    BONE GRAFT Left 7/13/2021    Procedure: BONE GRAFT;  Surgeon: Spenser Krishnamurthy MD;  Location: [facility] OR;  Service: Orthopedics;  Laterality: Left;  pro-dense    CORE DECOMPRESSION OF HEAD OF FEMUR Left 7/13/2021    Procedure: CORE DECOMPRESSION, FEMUR, HEAD;  Surgeon: Spenser Krishnamurthy MD;  Location: [facility] OR;  Service: Orthopedics;  Laterality: Left;    HIP ARTHROPLASTY Right 10/6/2021    Procedure: ARTHROPLASTY, HIP;  Surgeon: Spenser Krishnamurthy MD;  Location: [facility] OR;  Service: Orthopedics;  Laterality: Right;    INJECTION OF JOINT Right 7/12/2021    Procedure: right IA Hip Joint injection- per Dr Ruelas;  Surgeon: Juan David Ho MD;  Location: [facility];   Service: Pain Management;  Laterality: Right;    LYSIS, ADHESIONS, SHOULDER, ARTHROSCOPIC Left 7/24/2023    Procedure: LYSIS,ADHESIONS,SHOULDER,ARTHROSCOPIC;  Surgeon: Michael Ramirez MD;  Location: [facility] OR;  Service: Orthopedics;  Laterality: Left;    MANIPULATION OF SHOULDER JOINT Left 7/24/2023    Procedure: MANIPULATION, SHOULDER;  Surgeon: Michael Ramirez MD;  Location: [facility] OR;  Service: Orthopedics;  Laterality: Left;    PARTIAL ARTHROPLASTY OF SHOULDER Left 6/16/2022    Procedure: HEMIARTHROPLASTY, SHOULDER;  Surgeon: Michael Ramirez MD;  Location: [facility] OR;  Service: Orthopedics;  Laterality: Left;       Patient's Medications   New Prescriptions    No medications on file   Previous Medications    ACETAMINOPHEN (TYLENOL) 500 MG TABLET    Take 2 tablets (1,000 mg total) by mouth every 8 (eight) hours as needed for Pain.    ASPIRIN (ECOTRIN) 81 MG EC TABLET    Take 1 tablet (81 mg total) by mouth 2 (two) times a day. for 14 days    HYDROCODONE-ACETAMINOPHEN (NORCO) 5-325 MG PER TABLET    Take 1 tablet by mouth every 6 (six) hours as needed for Pain.    PREGABALIN (LYRICA) 50 MG CAPSULE    Take 50 mg by mouth 2 (two) times daily.   Modified Medications    No medications on file   Discontinued Medications    No medications on file       Family History   Problem Relation Name Age of Onset    Birth defects Neg Hx         Review of patient's allergies indicates:  No Known Allergies      Objective:      Physical Exam  Patient is alert and oriented, no distress. Skin is intact.     Imaging:    XR Results:  X-ray Shoulder 2 or More Views Right  Narrative: EXAMINATION:  XR SHOULDER COMPLETE 2 OR MORE VIEWS RIGHT    CLINICAL HISTORY:  Pain in right shoulder    TECHNIQUE:  Two or three views of the right shoulder were preformed.    COMPARISON:  None    FINDINGS:  No acute fracture or dislocation.  AC joint and glenohumeral joint spaces appear to be well maintained.  There is lucency and  sclerosis along with some probable subchondral collapse seen involving the right humeral head most consistent with AVN.  Impression: 1.  As above    Electronically signed by: Lennox De Paz DO  Date:    01/08/2025  Time:    12:42         Physician read: I agree with the above impression.    Assessment/Plan:   Miriam Jett is a 29 y.o. female with right shoulder avascular necrosis     Plan:    She has known right shoulder proximal humerus avascular necrosis.  There has been advancement since her previous imaging.  She has had increased pain and mechanical symptoms to go along with these changes in x-ray.  I recommend proceeding with MRI for further evaluation at this time.   Follow up with me after MRI.           Michael Ramirez MD    I, Flakito Sommer, acted as a scribe for Michael aRmirez MD for the duration of this office visit.

## 2025-01-08 ENCOUNTER — PATIENT MESSAGE (OUTPATIENT)
Dept: SPORTS MEDICINE | Facility: CLINIC | Age: 30
End: 2025-01-08
Payer: MEDICAID

## 2025-01-08 ENCOUNTER — HOSPITAL ENCOUNTER (OUTPATIENT)
Dept: RADIOLOGY | Facility: HOSPITAL | Age: 30
Discharge: HOME | End: 2025-01-08
Attending: STUDENT IN AN ORGANIZED HEALTH CARE EDUCATION/TRAINING PROGRAM
Payer: MEDICAID

## 2025-01-08 DIAGNOSIS — M25.511 RIGHT SHOULDER PAIN, UNSPECIFIED CHRONICITY: ICD-10-CM

## 2025-01-08 PROCEDURE — 73030 X-RAY EXAM OF SHOULDER: CPT | Mod: TC,RT

## 2025-01-08 PROCEDURE — 73030 X-RAY EXAM OF SHOULDER: CPT | Mod: 26,RT,, | Performed by: RADIOLOGY

## 2025-01-10 ENCOUNTER — OFFICE VISIT (OUTPATIENT)
Dept: SPORTS MEDICINE | Facility: CLINIC | Age: 30
End: 2025-01-10
Payer: MEDICAID

## 2025-01-10 DIAGNOSIS — M87.019 AVASCULAR NECROSIS OF BONE OF SHOULDER: Primary | ICD-10-CM

## 2025-01-13 ENCOUNTER — TELEPHONE (OUTPATIENT)
Dept: ORTHOPEDICS | Facility: CLINIC | Age: 30
End: 2025-01-13
Payer: MEDICAID

## 2025-01-13 DIAGNOSIS — F40.240 CLAUSTROPHOBIA: Primary | ICD-10-CM

## 2025-01-13 RX ORDER — DIAZEPAM 2 MG/1
2 TABLET ORAL SEE ADMIN INSTRUCTIONS
Qty: 2 TABLET | Refills: 0 | Status: SHIPPED | OUTPATIENT
Start: 2025-01-13 | End: 2025-01-14

## 2025-01-13 NOTE — TELEPHONE ENCOUNTER
Called pt regarding inserted message, spoke with pt; completed request    ----- Message from Tech Flakito sent at 1/13/2025  6:38 AM CST -----  Please call and see what pharmacy she would like the medication sent to.  ----- Message -----  From: Murphy Hernandez  Sent: 1/10/2025   3:30 PM CST  To: Michael Ramirez MD; Caitlyn Medrano; #    Good afternoon,    Ms. Jett is scheduled for an MRI on Thursday 01/16/25. She would like medication during the exam to help her relax and would like to see if your office could assist her with getting that in time for her appointment..    Thank you,  Murphy  Radiology Dept.

## 2025-01-16 ENCOUNTER — HOSPITAL ENCOUNTER (OUTPATIENT)
Dept: RADIOLOGY | Facility: HOSPITAL | Age: 30
Discharge: HOME | End: 2025-01-16
Attending: STUDENT IN AN ORGANIZED HEALTH CARE EDUCATION/TRAINING PROGRAM
Payer: MEDICAID

## 2025-01-16 DIAGNOSIS — M87.019 AVASCULAR NECROSIS OF BONE OF SHOULDER: ICD-10-CM

## 2025-01-16 PROCEDURE — 73221 MRI JOINT UPR EXTREM W/O DYE: CPT | Mod: TC,RT

## 2025-01-16 PROCEDURE — 73221 MRI JOINT UPR EXTREM W/O DYE: CPT | Mod: 26,RT,, | Performed by: RADIOLOGY

## 2025-01-17 ENCOUNTER — PATIENT MESSAGE (OUTPATIENT)
Dept: SPORTS MEDICINE | Facility: CLINIC | Age: 30
End: 2025-01-17

## 2025-01-18 ENCOUNTER — PATIENT MESSAGE (OUTPATIENT)
Dept: SPORTS MEDICINE | Facility: CLINIC | Age: 30
End: 2025-01-18
Payer: MEDICAID

## 2025-03-31 ENCOUNTER — PATIENT MESSAGE (OUTPATIENT)
Dept: SPORTS MEDICINE | Facility: CLINIC | Age: 30
End: 2025-03-31
Payer: MEDICAID

## 2025-03-31 DIAGNOSIS — M19.211 OTHER SECONDARY OSTEOARTHRITIS OF RIGHT SHOULDER: Primary | ICD-10-CM

## 2025-03-31 DIAGNOSIS — M87.019 AVASCULAR NECROSIS OF BONE OF SHOULDER: ICD-10-CM

## 2025-04-03 ENCOUNTER — PATIENT MESSAGE (OUTPATIENT)
Dept: SPORTS MEDICINE | Facility: CLINIC | Age: 30
End: 2025-04-03
Payer: MEDICAID

## 2025-04-09 ENCOUNTER — PATIENT MESSAGE (OUTPATIENT)
Dept: SPORTS MEDICINE | Facility: CLINIC | Age: 30
End: 2025-04-09
Payer: MEDICAID

## 2025-04-10 ENCOUNTER — HOSPITAL ENCOUNTER (EMERGENCY)
Facility: HOSPITAL | Age: 30
Discharge: HOME | End: 2025-04-10
Attending: EMERGENCY MEDICINE
Payer: MEDICAID

## 2025-04-10 VITALS
HEART RATE: 74 BPM | RESPIRATION RATE: 16 BRPM | WEIGHT: 170 LBS | BODY MASS INDEX: 26.68 KG/M2 | DIASTOLIC BLOOD PRESSURE: 92 MMHG | HEIGHT: 67 IN | OXYGEN SATURATION: 100 % | SYSTOLIC BLOOD PRESSURE: 139 MMHG | TEMPERATURE: 99 F

## 2025-04-10 DIAGNOSIS — M25.571 PAIN IN RIGHT ANKLE: ICD-10-CM

## 2025-04-10 PROCEDURE — 99283 EMERGENCY DEPT VISIT LOW MDM: CPT | Mod: 25

## 2025-04-10 PROCEDURE — 25000003 PHARM REV CODE 250

## 2025-04-10 RX ORDER — IBUPROFEN 800 MG/1
800 TABLET ORAL
Status: COMPLETED | OUTPATIENT
Start: 2025-04-10 | End: 2025-04-10

## 2025-04-10 RX ORDER — NAPROXEN 500 MG/1
500 TABLET ORAL 2 TIMES DAILY WITH MEALS
Qty: 60 TABLET | Refills: 0 | Status: SHIPPED | OUTPATIENT
Start: 2025-04-10

## 2025-04-10 RX ADMIN — IBUPROFEN 800 MG: 800 TABLET, FILM COATED ORAL at 09:04

## 2025-04-10 NOTE — Clinical Note
"Miriam"Fredipatitoandrez Jett was seen and treated in our emergency department on 4/10/2025.  She may return to work on 04/15/2025.  May return sooner if she is feeling well enough.     If you have any questions or concerns, please don't hesitate to call.      Breanne Ball PA-C"

## 2025-04-10 NOTE — ED PROVIDER NOTES
Encounter Date: 4/10/2025       History     Chief Complaint   Patient presents with    Ankle Injury     Pt states she was walking down the stairs and she rolled her right ankle. Pts ankle is swollen and painful.      29-year-old female with a past medical history significant of AVN presenting to the emergency department with complaints of right ankle pain after sustaining an injury approximately 1 hour prior to arrival.  Patient reports that she was walking down the stairs when she rolled her ankle.  She has associated swelling to the lateral side of her right ankle.  Patient reports that she has been unable to apply pressure and walk secondary to the pain.  Denies hearing a popping sound or having joint instability.  No other injuries.  Denies fever, chills, decreased range of motion, paresthesias, open wounds, redness, paralysis, numbness, calf pain, and all other symptoms at this time.    The history is provided by the patient.     Review of patient's allergies indicates:   Allergen Reactions    Duloxetine Other (See Comments)     Past Medical History:   Diagnosis Date    Miscarriage      Past Surgical History:   Procedure Laterality Date    BONE GRAFT Left 7/13/2021    Procedure: BONE GRAFT;  Surgeon: Spenser Krishnamurthy MD;  Location: [facility] OR;  Service: Orthopedics;  Laterality: Left;  pro-dense    CORE DECOMPRESSION OF HEAD OF FEMUR Left 7/13/2021    Procedure: CORE DECOMPRESSION, FEMUR, HEAD;  Surgeon: Spenser Krishnamurthy MD;  Location: [facility] OR;  Service: Orthopedics;  Laterality: Left;    HIP ARTHROPLASTY Right 10/6/2021    Procedure: ARTHROPLASTY, HIP;  Surgeon: Spenser Krishnamurthy MD;  Location: [facility] OR;  Service: Orthopedics;  Laterality: Right;    INJECTION OF JOINT Right 7/12/2021    Procedure: right IA Hip Joint injection- per Dr Ruelas;  Surgeon: Juan David Ho MD;  Location: [facility];  Service: Pain Management;  Laterality: Right;    LYSIS, ADHESIONS, SHOULDER, ARTHROSCOPIC Left 7/24/2023     Procedure: LYSIS,ADHESIONS,SHOULDER,ARTHROSCOPIC;  Surgeon: Michael Ramirez MD;  Location: [facility] OR;  Service: Orthopedics;  Laterality: Left;    MANIPULATION OF SHOULDER JOINT Left 7/24/2023    Procedure: MANIPULATION, SHOULDER;  Surgeon: Michael Ramirez MD;  Location: [facility] OR;  Service: Orthopedics;  Laterality: Left;    PARTIAL ARTHROPLASTY OF SHOULDER Left 6/16/2022    Procedure: HEMIARTHROPLASTY, SHOULDER;  Surgeon: Michael Ramirez MD;  Location: [facility] OR;  Service: Orthopedics;  Laterality: Left;     Family History   Problem Relation Name Age of Onset    Birth defects Neg Hx       Social History[1]  Review of Systems   Constitutional:  Negative for fever.   HENT:  Negative for sore throat.    Respiratory:  Negative for shortness of breath.    Cardiovascular:  Negative for chest pain.   Gastrointestinal:  Negative for nausea.   Genitourinary:  Negative for dysuria.   Musculoskeletal:  Positive for arthralgias (Right ankle) and joint swelling (Right ankle). Negative for back pain.   Skin:  Negative for rash.   Neurological:  Negative for weakness.   Hematological:  Does not bruise/bleed easily.   All other systems reviewed and are negative.      Physical Exam     Initial Vitals [04/10/25 0917]   BP Pulse Resp Temp SpO2   (!) 139/92 74 16 98.8 °F (37.1 °C) 100 %      MAP       --         Physical Exam    Nursing note reviewed.  Constitutional: She appears well-developed and well-nourished.  Non-toxic appearance. She does not have a sickly appearance. She does not appear ill. No distress.   HENT:   Head: Normocephalic and atraumatic.   Right Ear: Hearing normal.   Left Ear: Hearing normal.   Nose: Nose normal. Mouth/Throat: Uvula is midline and oropharynx is clear and moist.   Eyes: Conjunctivae, EOM and lids are normal. Pupils are equal, round, and reactive to light.   Neck: Trachea normal. Neck supple.   Normal range of motion.   Full passive range of motion without pain.      Cardiovascular:  Normal rate, regular rhythm, normal heart sounds, intact distal pulses and normal pulses.           Pulmonary/Chest: Effort normal and breath sounds normal.   Abdominal: Abdomen is soft. Bowel sounds are normal. There is no abdominal tenderness. There is no rebound and no guarding.   Musculoskeletal:         General: Normal range of motion.      Cervical back: Normal, full passive range of motion without pain, normal range of motion and neck supple.      Comments: Right lower extremity:  Moderate swelling noted to the lateral ankle.  Otherwise no ecchymosis, edema, erythema, or open wounds.  No signs of cellulitis.  No varicose veins.  No obvious deformity.  Tenderness to palpation over the lateral malleolus.  No calf tenderness.  No evidence of joint laxity.  Normal bulk and tone.  Full range of motion throughout; pain noted with range of motion of ankle.  Distal pulses intact.  Capillary refill less than 2 seconds.  Sensation intact.  Compartments are soft.      Neurological: She is alert and oriented to person, place, and time. She has normal strength and normal reflexes. No cranial nerve deficit or sensory deficit. GCS eye subscore is 4. GCS verbal subscore is 5. GCS motor subscore is 6.   Skin: Skin is warm and dry.   Psychiatric: She has a normal mood and affect. Her behavior is normal. Thought content normal.         ED Course   Orthopedic Injury    Date/Time: 4/10/2025 10:04 AM    Performed by: Breanne Ball PA-C  Authorized by: Bryanna Gamino MD    Location procedure was performed:  [facility] EMERGENCY DEPARTMENT  Injury:     Injury location:  Ankle    Location details:  Right ankle    Injury type:  Soft tissue      Pre-procedure assessment:     Neurovascular status: Neurovascularly intact      Distal perfusion: normal      Neurological function: normal      Range of motion: normal        Selections made in this section will also lock the Injury type section above.:     Immobilization:   Crutches    Supplies used:  Elastic bandage  Post-procedure assessment:     Neurovascular status: Neurovascularly intact      Distal perfusion: normal      Neurological function: normal      Range of motion: normal      Patient tolerance:  Patient tolerated the procedure well with no immediate complications    Labs Reviewed - No data to display       Imaging Results              X-Ray Ankle Complete Right (Final result)  Result time 04/10/25 09:36:31      Final result by Bobby Myers MD (04/10/25 09:36:31)                   Impression:      No acute fracture or dislocation.      Electronically signed by: Bobby Myers MD  Date:    04/10/2025  Time:    09:36               Narrative:    EXAMINATION:  XR ANKLE COMPLETE 3 VIEW RIGHT    CLINICAL HISTORY:  XR ANKLE COMPLETE 3 VIEW RIGHTPain in right ankle and joints of right foot    COMPARISON:  None    FINDINGS:  Three views of the right ankle were obtained.    No evidence of acute fracture or dislocation.  Suspect bone infarct or less likely benign enchondroma distal tibia metaphysis.  Bony mineralization is normal.  Soft tissues are unremarkable.                                       Medications   ibuprofen tablet 800 mg (800 mg Oral Given 4/10/25 0936)     Medical Decision Making  Differential diagnosis to include but not limited to:  Fracture, dislocation, soft tissue injury.    Amount and/or Complexity of Data Reviewed  Radiology: ordered.     Details: No acute fracture or dislocation noted on ankle x-ray.  Suspect bony infarct or less likely benign N chondroma and distal tibia metaphysis.    Risk  Prescription drug management.  Risk Details: Regarding ANKLE PAIN, for treatment, patient instructed to: rest ankle for several days without applying weight on ankle; use an ACE bandage or ankle brace; consider using crutches or a cane to help take the weight off a sore or unsteady ankle; keep foot/ankle elevated; apply ice to area immediately and for 10-15 minutes  every hour for the first day, then, apply ice every 3-4 hours for 2 more days; and try over-the-counter Tylenol or Ibuprofen for pain and swelling. Patient advised to notify primary care provider or return to ED if swelling does not decrease within 2-3 days or notice signs or symptoms of infection (redness, increased pain, fever, and warmth around ankle); or if they notice a popping sound and have immediate trouble using or moving ankle. For prevention, patient advised to obtain/maintain healthy weight; warm up before exercising; avoid sports and activities in which proper conditioning has not been achieved; ensure that shoes fit properly; use ankle support braces, work on balance, and do agility exercises.     Regarding incidental finding on x-ray, recommended the patient follows up with her orthopedist with the next availability.  Discussed with the patient that findings could be related to avascular necrosis.  Patient verbalizes understanding and states that she will follow up with her orthopedist.    I discussed with patient and/or family/caretaker that evaluation in the ED does not suggest any emergent or life threatening medical conditions requiring immediate intervention beyond what was provided in the ED, and I believe patient is safe for discharge. Regardless, an unremarkable evaluation in the ED does not preclude the development or presence of a serious of life threatening condition. As such, patient was instructed to return immediately for any worsening or change in current symptoms.                                       Clinical Impression:  Final diagnoses:  [M25.571] Pain in right ankle          ED Disposition Condition    Discharge Stable          ED Prescriptions       Medication Sig Dispense Start Date End Date Auth. Provider    naproxen (NAPROSYN) 500 MG tablet Take 1 tablet (500 mg total) by mouth 2 (two) times daily with meals. 60 tablet 4/10/2025 -- Breanne Ball PA-C          Follow-up  Information       Follow up With Specialties Details Why Contact Info    [facility] - Emergency Dept. Emergency Medicine  If symptoms worsen [id number] [facility] [id number] [phone]    Follow up with your orthopedist at the next availability.                     [1]   Social History  Tobacco Use    Smoking status: Never     Passive exposure: Never    Smokeless tobacco: Never   Substance Use Topics    Alcohol use: Yes     Alcohol/week: 1.0 standard drink of alcohol     Types: 1 Glasses of wine per week     Comment: Occas    Drug use: Not Currently     Types: Marijuana        Breanne Ball PA-C  04/10/25 [id number]

## 2025-04-10 NOTE — PROGRESS NOTES
Virtual Orthopaedic Follow-Up Visit    Last Appointment: 1/17/25  Diagnosis: right shoulder avascular necrosis   Prior Procedure: MRI      Miriam Jett is a 29 y.o. female who is here for f/u evaluation of her right shoulder. Patient was previously seen by me on 1/17/25 at which point her MRI showed progression of her humeral head AVN.  The patient returns today reporting continued pain in the right shoulder but her motion is pretty well preserved. She also reports recent right ankle injury on 4/10/25 that was evaluated at ER the same day.     To review her history, Miriam Jett is a 29 y.o. female who has known right shoulder avascular necrosis. At follow-up on 1/10/25, XR showed advancement since her previous imaging.  She had increased pain and mechanical symptoms to go along with these changes in x-ray. I recommended proceeding with MRI for further evaluation at that time.    Patient's medications, allergies, past medical, surgical, social and family histories were reviewed and updated as appropriate.    Review of Systems   All systems reviewed were negative.  Specifically, the patient denies fever, chills, weight loss, chest pain, shortness of breath, or dyspnea on exertion.      Past Medical History:   Diagnosis Date    Miscarriage        Past Surgical History:   Procedure Laterality Date    BONE GRAFT Left 7/13/2021    Procedure: BONE GRAFT;  Surgeon: Spenser Krishnamurthy MD;  Location: [facility] OR;  Service: Orthopedics;  Laterality: Left;  pro-dense    CORE DECOMPRESSION OF HEAD OF FEMUR Left 7/13/2021    Procedure: CORE DECOMPRESSION, FEMUR, HEAD;  Surgeon: Spenser Krishnamurthy MD;  Location: [facility] OR;  Service: Orthopedics;  Laterality: Left;    HIP ARTHROPLASTY Right 10/6/2021    Procedure: ARTHROPLASTY, HIP;  Surgeon: Spenser Krishnamurthy MD;  Location: [facility] OR;  Service: Orthopedics;  Laterality: Right;    INJECTION OF JOINT Right 7/12/2021    Procedure: right IA Hip Joint  injection- per Dr Ruelas;  Surgeon: Juan David Ho MD;  Location: [facility]IN [facility];  Service: Pain Management;  Laterality: Right;    LYSIS, ADHESIONS, SHOULDER, ARTHROSCOPIC Left 7/24/2023    Procedure: LYSIS,ADHESIONS,SHOULDER,ARTHROSCOPIC;  Surgeon: Michael Ramirez MD;  Location: [facility] OR;  Service: Orthopedics;  Laterality: Left;    MANIPULATION OF SHOULDER JOINT Left 7/24/2023    Procedure: MANIPULATION, SHOULDER;  Surgeon: Michael Ramirez MD;  Location: [facility] OR;  Service: Orthopedics;  Laterality: Left;    PARTIAL ARTHROPLASTY OF SHOULDER Left 6/16/2022    Procedure: HEMIARTHROPLASTY, SHOULDER;  Surgeon: Michael Ramirez MD;  Location: [facility] OR;  Service: Orthopedics;  Laterality: Left;       Patient's Medications   New Prescriptions    No medications on file   Previous Medications    CYPROHEPTADINE (PERIACTIN) 4 MG TABLET    Take 4 mg by mouth 2 (two) times daily as needed.    DIAZEPAM (VALIUM) 2 MG TABLET    Take 1 tablet (2 mg total) by mouth As instructed for Anxiety (take 1 tablet 60 minutes prior to MRI, take second just before if still feeling anxious).    ERGOCALCIFEROL (ERGOCALCIFEROL) 50,000 UNIT CAP    Take 1 capsule by mouth every 7 days.    FAMOTIDINE (PEPCID) 20 MG TABLET    TAKE 1 TABLET TWICE A DAY FOR 30 DAYS    HYDROXYZINE HCL (ATARAX) 25 MG TABLET    Take 1 tablet 3 times a day by oral route as needed.    MEGESTROL (MEGACE) 20 MG TAB    Take 1 tablet by mouth every morning.    MELOXICAM (MOBIC) 15 MG TABLET        NAPROXEN (NAPROSYN) 500 MG TABLET        TIZANIDINE (ZANAFLEX) 2 MG TABLET       Modified Medications    No medications on file   Discontinued Medications    No medications on file       Family History   Problem Relation Name Age of Onset    Birth defects Neg Hx         Review of patient's allergies indicates:   Allergen Reactions    Duloxetine Other (See Comments)         Objective:      Physical Exam  Patient is alert and oriented, no distress. Skin is intact.  Neuro is normal with no focal motor or sensory findings.    Cervical exam is unremarkable. Intact cervical ROM. Negative Spurling's test    Physical Exam:  RIGHT    LEFT    Scap Dyskinesis/Winging (-)    (-)    Tenderness:          Greater Tuberosity             (-)    (-)  Bicipital Groove  (-)    (-)  AC joint   (-)    (-)  Other:     ROM:  Forward Elevation 170    150  Abduction  120    90  ER (at side)  80    30    Strength:   Supraspinatus  5/5    5/5  Infraspinatus  5/5    5/5  Subscap / IR  5/5    5/5     Special Tests:   Neer:    (-)    (-)   Jane:   +    (-)   SS Stress:   +    (-)   Bear Hug:   (-)    (-)   Orion's:   +    (-)   Resisted Thrower's:   (-)    (-)   Speed's   (-)    (-)   Cross Arm Abduction:  (-)    (-)    Neurovascular examination  - Motor grossly intact bilaterally to shoulder abduction, elbow flexion and extension, wrist flexion and extension, and intrinsic hand musculature  - Sensation intact to light touch bilaterally in axillary, median, radial, and ulnar distributions  - Symmetrical radial pulses      Imaging:    XR Results:  Results for orders placed during the hospital encounter of 01/08/25    X-ray Shoulder 2 or More Views Right    Narrative  EXAMINATION:  XR SHOULDER COMPLETE 2 OR MORE VIEWS RIGHT    CLINICAL HISTORY:  Pain in right shoulder    TECHNIQUE:  Two or three views of the right shoulder were preformed.    COMPARISON:  None    FINDINGS:  No acute fracture or dislocation.  AC joint and glenohumeral joint spaces appear to be well maintained.  There is lucency and sclerosis along with some probable subchondral collapse seen involving the right humeral head most consistent with AVN.    Impression  1.  As above      Electronically signed by: Lennox De Paz DO  Date:    01/08/2025  Time:    12:42      MRI Results:  Results for orders placed during the hospital encounter of 01/16/25    MRI Shoulder Without Contrast Right    Narrative  EXAMINATION:  MRI SHOULDER WITHOUT  CONTRAST RIGHT    CLINICAL HISTORY:  Aseptic necrosis, shoulder;  Idiopathic aseptic necrosis of unspecified shoulder    TECHNIQUE:  Multisequence, multiplanar MR imaging of the shoulder performed without contrast.    COMPARISON:  05/20/2022 MRI shoulder; recent radiograph    FINDINGS:  Note that shoulder positioning/rotation is vastly different from 05/20/2022 study.    Rotator cuff:    Supraspinatus: Intact    Infraspinatus: Intact    Subscapularis: Intact    Teres minor: Intact    Muscle bulk is maintained.    Labrum: No discrete tear    Biceps: Long head biceps tendon is intact.    Cartilage/bone: Interval progression of humeral head osteonecrosis changes including progression of cortical irregularity and flattening.  Overlying high-grade chondromalacia/cartilage loss noted.    Acromioclavicular joint:Unremarkable    Miscellaneous: No significant joint effusion.  Subacromial/subdeltoid bursal fluid present.    Impression  Progression of humeral head osteonecrosis/AVN changes.      Electronically signed by: Saul Rose MD  Date:    01/17/2025  Time:    08:09      CT Results:  No results found for this or any previous visit.        Physician read: I agree with the above impression.    Assessment/Plan:   Miriam Jett is a 29 y.o. female with right shoulder avascular necrosis      Plan:    She has known avascular necrosis of her right humeral head.  Despite this, she continues to have relatively well-preserved range of motion.  She does have pain on a daily basis and pain with activity.  Reviewed continued treatment options of intermittent glenohumeral CSI, physical therapy versus potential operative treatment in the form of hemiarthroplasty   I recommend proceeding with intra-articular corticosteroid injection into the right glenohumeral joint. The patient is in agreement with this plan.   Procedure performed today and patient tolerated the procedure well with no immediate complications.   Follow  up with me as needed.           Michael Ramirez MD    I, Flakito Sommer, acted as a scribe for Michael Ramirez MD for the duration of this office visit.

## 2025-04-11 ENCOUNTER — PATIENT MESSAGE (OUTPATIENT)
Dept: SPORTS MEDICINE | Facility: CLINIC | Age: 30
End: 2025-04-11
Payer: MEDICAID

## 2025-04-16 ENCOUNTER — PATIENT MESSAGE (OUTPATIENT)
Dept: SPORTS MEDICINE | Facility: CLINIC | Age: 30
End: 2025-04-16

## 2025-04-16 ENCOUNTER — OFFICE VISIT (OUTPATIENT)
Dept: SPORTS MEDICINE | Facility: CLINIC | Age: 30
End: 2025-04-16
Payer: MEDICAID

## 2025-04-16 VITALS — BODY MASS INDEX: 26.68 KG/M2 | WEIGHT: 170 LBS | HEIGHT: 67 IN

## 2025-04-16 DIAGNOSIS — M87.019 AVASCULAR NECROSIS OF BONE OF SHOULDER: Primary | ICD-10-CM

## 2025-04-16 PROCEDURE — 20610 DRAIN/INJ JOINT/BURSA W/O US: CPT | Mod: PBBFAC,RT | Performed by: STUDENT IN AN ORGANIZED HEALTH CARE EDUCATION/TRAINING PROGRAM

## 2025-04-16 PROCEDURE — 99999PBSHW PR PBB SHADOW TECHNICAL ONLY FILED TO HB: Mod: PBBFAC,,,

## 2025-04-16 PROCEDURE — 99999 PR PBB SHADOW E&M-EST. PATIENT-LVL III: CPT | Mod: PBBFAC,,, | Performed by: STUDENT IN AN ORGANIZED HEALTH CARE EDUCATION/TRAINING PROGRAM

## 2025-04-16 PROCEDURE — 99213 OFFICE O/P EST LOW 20 MIN: CPT | Mod: PBBFAC | Performed by: STUDENT IN AN ORGANIZED HEALTH CARE EDUCATION/TRAINING PROGRAM

## 2025-04-16 RX ADMIN — TRIAMCINOLONE ACETONIDE 40 MG: 200 INJECTION, SUSPENSION INTRA-ARTICULAR; INTRAMUSCULAR at 08:04

## 2025-04-17 RX ORDER — TRIAMCINOLONE ACETONIDE 40 MG/ML
40 INJECTION, SUSPENSION INTRA-ARTICULAR; INTRAMUSCULAR
Status: DISCONTINUED | OUTPATIENT
Start: 2025-04-16 | End: 2025-04-17 | Stop reason: HOSPADM

## 2025-04-17 NOTE — PROCEDURES
Large Joint Aspiration/Injection: R glenohumeral    Date/Time: 4/16/2025 8:45 AM    Performed by: Michael Ramirez MD  Authorized by: Michael Ramirez MD    Consent Done?:  Yes (Verbal)  Indications:  Pain and arthritis  Site marked: the procedure site was marked    Timeout: prior to procedure the correct patient, procedure, and site was verified    Prep: patient was prepped and draped in usual sterile fashion      Local anesthesia used?: Yes    Anesthesia:  Local infiltration  Local anesthetic:  Lidocaine 1% without epinephrine    Details:  Needle Size:  22 G  Ultrasonic Guidance for needle placement?: No    Approach:  Posterior  Location:  Shoulder  Site:  R glenohumeral  Medications:  40 mg triamcinolone acetonide 40 mg/mL  Patient tolerance:  Patient tolerated the procedure well with no immediate complications

## 2025-06-23 ENCOUNTER — CLINICAL SUPPORT (OUTPATIENT)
Dept: REHABILITATION | Facility: HOSPITAL | Age: 30
End: 2025-06-23
Attending: STUDENT IN AN ORGANIZED HEALTH CARE EDUCATION/TRAINING PROGRAM
Payer: MEDICAID

## 2025-06-23 DIAGNOSIS — M19.211 OTHER SECONDARY OSTEOARTHRITIS OF RIGHT SHOULDER: ICD-10-CM

## 2025-06-23 DIAGNOSIS — M87.019 AVASCULAR NECROSIS OF BONE OF SHOULDER: ICD-10-CM

## 2025-06-23 PROCEDURE — 97530 THERAPEUTIC ACTIVITIES: CPT | Mod: PN

## 2025-06-23 PROCEDURE — 97161 PT EVAL LOW COMPLEX 20 MIN: CPT | Mod: PN

## 2025-06-23 NOTE — PROGRESS NOTES
Outpatient Rehab    Physical Therapy Evaluation    Patient Name: Miriam Jett  MRN: [id number]  YOB: 1995  Encounter Date: 6/23/2025    Therapy Diagnosis:   Encounter Diagnoses   Name Primary?    Other secondary osteoarthritis of right shoulder     Avascular necrosis of bone of shoulder      Physician: Michael Ramirez    Physician Orders: Eval and Treat  Medical Diagnosis: Other secondary osteoarthritis of right shoulder  Avascular necrosis of bone of shoulder  Surgical Diagnosis: Not applicable for this Episode   Surgical Date: Not applicable for this Episode  Days Since Last Surgery: Not applicable for this Episode    Visit # / Visits Authorized:  1 / 1  Insurance Authorization Period: 3/31/2025 to 3/31/2026  Date of Evaluation: 6/23/2025  Plan of Care Certification: 6/23/2025 to 8/31/2025     Time In:   2:45  Time Out:  3:30  Total Time (in minutes):   45  Total Billable Time (in minutes):  45    Intake Outcome Measure for FOTO Survey    Therapist reviewed FOTO scores for Miriam Jett on 6/23/2025.   FOTO report - see Media section or FOTO account episode details.     Intake Score: 55%    Precautions:       Subjective    Pt reports right shoulder pain 3/10 typically that worsens with motion sleep positions. Increased difficulty with grooming her hair and lifting overhead and various household chores. Pt recently received intra-articular corticosteroid injection into the right glenohumeral joint to manage pain and delay any surgical interventions.    She also reports lower back pain that limits lifting and rotating.     Pt does have and extensive h of multi joint AVN. Right hip replacement, left hip decompression, and left humeral steven arthroplasty 2022     Imaging:  X-RAY:FINDINGS:  No acute fracture or dislocation.  AC joint and glenohumeral joint spaces appear to be well maintained.  There is lucency and sclerosis along with some probable subchondral collapse  seen involving the right humeral head most consistent with AVN.    Past Medical History/Physical Systems Review:   Miriam Jett  has a past medical history of Miscarriage.    Miriam Jett  has a past surgical history that includes Injection of joint (Right, 7/12/2021); Core decompression of head of femur (Left, 7/13/2021); Bone graft (Left, 7/13/2021); Hip Arthroplasty (Right, 10/6/2021); Partial arthroplasty of shoulder (Left, 6/16/2022); lysis, adhesions, shoulder, arthroscopic (Left, 7/24/2023); and Manipulation of shoulder joint (Left, 7/24/2023).    Miriam has a current medication list which includes the following prescription(s): cyproheptadine, diazepam, ergocalciferol, famotidine, hydroxyzine hcl, megestrol, meloxicam, naproxen, naproxen, and tizanidine.    Review of patient's allergies indicates:   Allergen Reactions    Duloxetine Other (See Comments)        Objective    ROM:   RIGHT    LEFT  Forward Elevation       170                                          140  Abduction                    120                                          90  ER (at side)                 80                                            30     Strength:   Supraspinatus             4+/5                                         4/5  Infraspinatus               4+4/5                                        4/5  Subscap / IR               4+/5                                          4/5   Bicep   5/5      4/5  wrist flexion/ext           5/5      5/5            Treatment:  Pt participated in dynamic functional therapeutic activities to improve functional performance for 15 minutes, including:  HEP: red band resistance: pulley, resisted OH press, ER, and flexion   Mobility: cat/cow, child's pose, and open books      Time Entry(in minutes):       Assessment & Plan   Assessment  Miriam presents with a condition of Moderate complexity.   Presentation of Symptoms: Stable       Functional Limitations: Activity  tolerance, Bed mobility, Completing self-care activities, Completing work/school activities, Disrupted sleep pattern, Driving, Fine motor coordination, Manipulating objects, Pain when reaching, Pain with ADLs/IADLs, Participating in leisure activities, Performing household chores, Range of motion  Impairments: Abnormal muscle tone, Abnormal or restricted range of motion, Impaired physical strength, Lack of appropriate home exercise program, Pain with functional activity  Personal Factors Affecting Prognosis: Pain, Emotional    Patient Goal for Therapy (PT): decrease pain and improve overall mobility  Prognosis: Good    Plan  From a physical therapy perspective, the patient would benefit from: Skilled Rehab Services    Planned therapy interventions include: Therapeutic exercise, Therapeutic activities, Neuromuscular re-education, Manual therapy, ADLs/IADLs, Community/work reintegration, and Orthotic management and training.    Planned modalities to include: Biofeedback, Cryotherapy (cold pack), Electrical stimulation - attended, Electrical stimulation - passive/unattended, and Mechanical traction.        Visit Frequency: 2 times Per Week for 10 Weeks.       This plan was discussed with Patient.   Discussion participants: Agreed Upon Plan of Care             The patient's spiritual, cultural, and educational needs were considered, and the patient is agreeable to the plan of care and goals.     Education  Education was done with Patient. The patient's learning style includes Demonstration, Listening, and Pictures/video. The patient Demonstrates understanding.                 Goals:   Active       Hand and arm use       Patient will pull refrigerator door without difficulty or pain        Start:  06/23/25    Expected End:  09/01/25               Home activities       Patient will cook without pain or assistance        Start:  06/23/25    Expected End:  09/01/25               Lifting & carrying objects       Patient will  lift plate or box overhead        Start:  06/23/25    Expected End:  09/01/25            Patient will carry 10# box or heavy object in home        Start:  06/23/25    Expected End:  09/01/25               Pain       Patient will report pain of 1/10 demonstrating a reduction of overall pain       Start:  06/23/25    Expected End:  09/01/25            Patient will report a 2 point reduction in pain while performing cooking and cleaning       Start:  06/23/25    Expected End:  09/01/25               Strength       Patient will achieve bilateral shoulder flexion strength of 5/5       Start:  06/23/25    Expected End:  09/01/25                Rubi Mcgee, PT

## 2025-06-24 ENCOUNTER — CLINICAL SUPPORT (OUTPATIENT)
Dept: REHABILITATION | Facility: HOSPITAL | Age: 30
End: 2025-06-24
Payer: MEDICAID

## 2025-06-24 DIAGNOSIS — M25.511 ACUTE PAIN OF RIGHT SHOULDER: Primary | ICD-10-CM

## 2025-06-24 PROCEDURE — 97110 THERAPEUTIC EXERCISES: CPT | Mod: PN

## 2025-06-24 PROCEDURE — 97112 NEUROMUSCULAR REEDUCATION: CPT | Mod: PN

## 2025-06-24 PROCEDURE — 97530 THERAPEUTIC ACTIVITIES: CPT | Mod: PN

## 2025-06-24 NOTE — PROGRESS NOTES
Outpatient Rehab    Physical Therapy Visit    Patient Name: Miriam Jett  MRN: [id number]  YOB: 1995  Encounter Date: 6/24/2025    Therapy Diagnosis:   Encounter Diagnosis   Name Primary?    Acute pain of right shoulder Yes     Physician: Michael Ramirez*    Physician Orders: Eval and Treat  Medical Diagnosis: Other secondary osteoarthritis of right shoulder  Avascular necrosis of bone of shoulder  Surgical Diagnosis: Not applicable for this Episode   Surgical Date: Not applicable for this Episode  Days Since Last Surgery: Not applicable for this Episode    Visit # / Visits Authorized:  1 / 20  Insurance Authorization Period: 4/14/2025 to 4/1/2027  Date of Evaluation: 6/24/2025  Plan of Care Certification:       PT/PTA:     Number of PTA visits since last PT visit:   Time In:   2:45  Time Out:  3:30  Total Time (in minutes):   45  Total Billable Time (in minutes):  45    FOTO:  Intake Score:  %  Survey Score 2:  %  Survey Score 3:  %    Precautions:       Subjective         Feeling sore from last visit     Objective            Treatment:       Pt received therapeutic exercises to develop strength, endurance, ROM, flexibility, posture, and core stabilization for 10 including:  Cat cow  Miles pose  Supine  wand flexion     Pt received the following manual therapy techniques: Joint mobilizations, Manual traction, and Soft tissue Mobilization were applied to the: shoulder for  minutes, including:      Pt participated in neuromuscular re-education activities to improve: Sense, Proprioception, and Posture for 25 minutes. The following activities were included:  Iso walk put yellow 2x10 B IR/ER  Yellow retraction with abd to 90 deg 2x10  Cookie tray red 2x10  Resisted OH press with ER red 2x10  Cable row 30# 2x10    Pt participated in dynamic functional therapeutic activities to improve functional performance for 15  minutes, including:  Seated OH press 6# right and 3# left 2x10  Bent  over row 10# KB 2x10  [place] carry 15# 2 laps       Time Entry(in minutes):       Assessment & Plan   Assessment:     Pt tolerated session well. Emphasis on scap stabilization RTC strengthening and postural control and awareness.   The patient will continue to benefit from skilled outpatient physical therapy in order to address the deficits listed in the problem list on the initial evaluation, provide patient and family education, and maximize the patients level of independence in the home and community environments.     The patient's spiritual, cultural, and educational needs were considered, and the patient is agreeable to the plan of care and goals.           Plan:      Goals:     Rubi Mcgee, PT

## 2025-06-25 ENCOUNTER — PATIENT MESSAGE (OUTPATIENT)
Dept: SPORTS MEDICINE | Facility: CLINIC | Age: 30
End: 2025-06-25
Payer: MEDICAID

## 2025-07-02 ENCOUNTER — CLINICAL SUPPORT (OUTPATIENT)
Dept: REHABILITATION | Facility: HOSPITAL | Age: 30
End: 2025-07-02
Payer: MEDICAID

## 2025-07-02 DIAGNOSIS — M25.511 ACUTE PAIN OF RIGHT SHOULDER: Primary | ICD-10-CM

## 2025-07-02 PROCEDURE — 97112 NEUROMUSCULAR REEDUCATION: CPT | Mod: PN

## 2025-07-02 PROCEDURE — 97110 THERAPEUTIC EXERCISES: CPT | Mod: PN

## 2025-07-02 NOTE — PROGRESS NOTES
Outpatient Rehab    Physical Therapy Visit    Patient Name: Miriam Jett  MRN: [id number]  YOB: 1995  Encounter Date: 7/2/2025    Therapy Diagnosis:   Encounter Diagnosis   Name Primary?    Acute pain of right shoulder Yes     Physician: Michael Ramirez*    Physician Orders: Eval and Treat  Medical Diagnosis: Other secondary osteoarthritis of right shoulder  Avascular necrosis of bone of shoulder  Surgical Diagnosis: Not applicable for this Episode   Surgical Date: Not applicable for this Episode  Days Since Last Surgery: Not applicable for this Episode    Visit # / Visits Authorized:  2 / 20  Insurance Authorization Period: 6/24/2025 to 8/24/2025  Date of Evaluation: 3/31/2025  6/24/2025  Plan of Care Certification: 6/23/2025 to 9/1/2025      PT/PTA:     Number of PTA visits since last PT visit:   Time In:   2:45  Time Out:  3:30  Total Time (in minutes):   45  Total Billable Time (in minutes):  45    FOTO:  Intake Score:  %  Survey Score 2:  %  Survey Score 3:  %    Precautions:       Subjective         Feeling sore from last visit     Objective            Treatment:       Pt received therapeutic exercises to develop strength, endurance, ROM, flexibility, posture, and core stabilization for 10 including:  Cat cow  Miles pose  Supine  wand flexion   Ube 3/3  Standing wand flexion 2x10    Pt received the following manual therapy techniques: Joint mobilizations, Manual traction, and Soft tissue Mobilization were applied to the: shoulder for  minutes, including:      Pt participated in neuromuscular re-education activities to improve: Sense, Proprioception, and Posture for 25 minutes. The following activities were included:  Iso walk put yellow 2x10 B IR/ER  D2 cable pull 20x ea side  Cable 30# 2x10  Resisted blue band IR/ER 2x10 ea  Resisted adduction green band 2x10  Standing flexion holding red yoga ball 1x10  Qp alternating ext 1x10  Prone T 2x10    Pt participated in dynamic  functional therapeutic activities to improve functional performance for 15  minutes, including:  Seated OH press 6# right and 3# left 2x10  Bent over row 10# KB 2x10  [place] carry 15# 2 laps       Time Entry(in minutes):       Assessment & Plan   Assessment:     Pt tolerated session well. Emphasis on scap stabilization RTC strengthening and postural control and awareness. Pt able to advance to more resistive training and OH flexion. Notable fatigue with holding yoga ball with standing flexion.   The patient will continue to benefit from skilled outpatient physical therapy in order to address the deficits listed in the problem list on the initial evaluation, provide patient and family education, and maximize the patients level of independence in the home and community environments.     The patient's spiritual, cultural, and educational needs were considered, and the patient is agreeable to the plan of care and goals.           Plan:      Goals:   Active       Hand and arm use       Patient will pull refrigerator door without difficulty or pain        Start:  06/23/25    Expected End:  09/01/25               Home activities       Patient will cook without pain or assistance        Start:  06/23/25    Expected End:  09/01/25               Lifting & carrying objects       Patient will lift plate or box overhead        Start:  06/23/25    Expected End:  09/01/25            Patient will carry 10# box or heavy object in home        Start:  06/23/25    Expected End:  09/01/25               Pain       Patient will report pain of 1/10 demonstrating a reduction of overall pain       Start:  06/23/25    Expected End:  09/01/25            Patient will report a 2 point reduction in pain while performing cooking and cleaning       Start:  06/23/25    Expected End:  09/01/25               Strength       Patient will achieve bilateral shoulder flexion strength of 5/5       Start:  06/23/25    Expected End:  09/01/25                 Rubi Mcgee, PT

## 2025-07-14 ENCOUNTER — CLINICAL SUPPORT (OUTPATIENT)
Dept: REHABILITATION | Facility: HOSPITAL | Age: 30
End: 2025-07-14
Payer: MEDICAID

## 2025-07-14 DIAGNOSIS — M25.511 ACUTE PAIN OF RIGHT SHOULDER: Primary | ICD-10-CM

## 2025-07-14 DIAGNOSIS — M75.02 ADHESIVE CAPSULITIS OF LEFT SHOULDER: ICD-10-CM

## 2025-07-14 DIAGNOSIS — Z96.612 S/P SHOULDER HEMIARTHROPLASTY, LEFT: ICD-10-CM

## 2025-07-14 PROCEDURE — 97110 THERAPEUTIC EXERCISES: CPT | Mod: PN

## 2025-07-14 PROCEDURE — 97530 THERAPEUTIC ACTIVITIES: CPT | Mod: PN

## 2025-07-14 PROCEDURE — 97112 NEUROMUSCULAR REEDUCATION: CPT | Mod: PN

## 2025-07-14 NOTE — PROGRESS NOTES
Outpatient Rehab    Physical Therapy Visit    Patient Name: Miriam Jett  MRN: [id number]  YOB: 1995  Encounter Date: 7/14/2025    Therapy Diagnosis:   Encounter Diagnoses   Name Primary?    Acute pain of right shoulder Yes    S/P shoulder hemiarthroplasty, left     Adhesive capsulitis of left shoulder      Physician: Michael Ramirez*    Physician Orders: Eval and Treat  Medical Diagnosis: Other secondary osteoarthritis of right shoulder  Avascular necrosis of bone of shoulder  Surgical Diagnosis: Not applicable for this Episode   Surgical Date: Not applicable for this Episode  Days Since Last Surgery: Not applicable for this Episode    Visit # / Visits Authorized:  3 / 20  Insurance Authorization Period: 6/24/2025 to 8/24/2025  Date of Evaluation: 3/31/2025  6/24/2025  Plan of Care Certification: 6/23/2025 to 9/1/2025      PT/PTA:     Number of PTA visits since last PT visit:   Time In:   2:00  Time Out:  2:45  Total Time (in minutes):   45  Total Billable Time (in minutes):  45    FOTO:  Intake Score:  %  Survey Score 2:  %  Survey Score 3:  %    Precautions:       Subjective         Feeling good. She missed last visit due to fatigue     Objective            Treatment:       Pt received therapeutic exercises to develop strength, endurance, ROM, flexibility, posture, and core stabilization for 10 including:  Cat cow  Miles pose  Supine  wand flexion   Ube 3/3  Standing wand flexion 2x10    Pt received the following manual therapy techniques: Joint mobilizations, Manual traction, and Soft tissue Mobilization were applied to the: shoulder for  minutes, including:      Pt participated in neuromuscular re-education activities to improve: Sense, Proprioception, and Posture for 25 minutes. The following activities were included:  Cable 30# 2x10  Resisted red band IR/ER 2x10 ea  Standing flexion holding red yoga ball 1x10  Qp alternating ext 1x10  Prone T 2x10  Lat pull down 50#  2x20    Pt participated in dynamic functional therapeutic activities to improve functional performance for 15  minutes, including:  Seated OH press 6# right and 3# left 2x10  Bent over row 10# KB 2x10  [place] carry 15# 2 laps       Time Entry(in minutes):       Assessment & Plan   Assessment:     Pt tolerated session well. Emphasis on scap stabilization and postural control and awareness. Min cues needed for postioning and technique. Improved active left flexion noted and less winging with right Notable fatigue with holding yoga ball with standing flexion.   The patient will continue to benefit from skilled outpatient physical therapy in order to address the deficits listed in the problem list on the initial evaluation, provide patient and family education, and maximize the patients level of independence in the home and community environments.     The patient's spiritual, cultural, and educational needs were considered, and the patient is agreeable to the plan of care and goals.           Plan:      Goals:   Active       Hand and arm use       Patient will pull refrigerator door without difficulty or pain        Start:  06/23/25    Expected End:  09/01/25               Home activities       Patient will cook without pain or assistance        Start:  06/23/25    Expected End:  09/01/25               Lifting & carrying objects       Patient will lift plate or box overhead        Start:  06/23/25    Expected End:  09/01/25            Patient will carry 10# box or heavy object in home        Start:  06/23/25    Expected End:  09/01/25               Pain       Patient will report pain of 1/10 demonstrating a reduction of overall pain       Start:  06/23/25    Expected End:  09/01/25            Patient will report a 2 point reduction in pain while performing cooking and cleaning       Start:  06/23/25    Expected End:  09/01/25               Strength       Patient will achieve bilateral shoulder flexion strength of 5/5        Start:  06/23/25    Expected End:  09/01/25                  Rubi Mcgee, PT

## 2025-07-15 ENCOUNTER — PATIENT MESSAGE (OUTPATIENT)
Dept: SPORTS MEDICINE | Facility: CLINIC | Age: 30
End: 2025-07-15
Payer: MEDICAID

## 2025-07-15 ENCOUNTER — TELEPHONE (OUTPATIENT)
Dept: SPORTS MEDICINE | Facility: CLINIC | Age: 30
End: 2025-07-15
Payer: MEDICAID

## 2025-07-22 DIAGNOSIS — M25.511 RIGHT SHOULDER PAIN, UNSPECIFIED CHRONICITY: Primary | ICD-10-CM

## 2025-07-22 NOTE — PROGRESS NOTES
Virtual Orthopaedic Follow-Up Visit    Last Appointment: 4/16/25  Diagnosis: right shoulder avascular necrosis   Prior Procedure: R GH CSI      Miriam Jett is a 29 y.o. female who is here for f/u evaluation of her right shoulder. Patient was previously seen by me on 4/16/25 at which point she had pain with daily activity and we elected to proceed with right glenohumeral joint.  The patient returns today reporting significant worsening of her right shoulder pain recently. She reports difficulty with trying to move her arm due to the pain. She reports popping, grinding, and feeling as if the shoulder is dislocated. She has been using heat, ibuprofen, and Naproxen for her pain.     To review her history, Miriam Jett is a 29 y.o. female who has known right shoulder avascular necrosis. At follow-up on 1/10/25, XR showed advancement since her previous imaging.  She had increased pain and mechanical symptoms to go along with these changes in x-ray. I recommended proceeding with MRI for further evaluation at that time. She returned on 1/17/25 at which point her MRI showed progression of her humeral head AVN.     Treatment to date: Rest, activity modification, right glenohumeral CSI (4/16/25), ibuprofen, Naproxen    Patient's medications, allergies, past medical, surgical, social and family histories were reviewed and updated as appropriate.    Review of Systems   All systems reviewed were negative.  Specifically, the patient denies fever, chills, weight loss, chest pain, shortness of breath, or dyspnea on exertion.      Past Medical History:   Diagnosis Date    Miscarriage        Past Surgical History:   Procedure Laterality Date    BONE GRAFT Left 7/13/2021    Procedure: BONE GRAFT;  Surgeon: Spenser Krishnamurthy MD;  Location: [facility];  Service: Orthopedics;  Laterality: Left;  pro-dense    CORE DECOMPRESSION OF HEAD OF FEMUR Left 7/13/2021    Procedure: CORE DECOMPRESSION, FEMUR, HEAD;   Surgeon: Spenser Krishnamurthy MD;  Location: [facility] OR;  Service: Orthopedics;  Laterality: Left;    HIP ARTHROPLASTY Right 10/6/2021    Procedure: ARTHROPLASTY, HIP;  Surgeon: Spenser Krishnamurthy MD;  Location: [facility] OR;  Service: Orthopedics;  Laterality: Right;    INJECTION OF JOINT Right 7/12/2021    Procedure: right IA Hip Joint injection- per Dr Ruelas;  Surgeon: Juan David Ho MD;  Location: [facility];  Service: Pain Management;  Laterality: Right;    LYSIS, ADHESIONS, SHOULDER, ARTHROSCOPIC Left 7/24/2023    Procedure: LYSIS,ADHESIONS,SHOULDER,ARTHROSCOPIC;  Surgeon: Michael Ramirez MD;  Location: [facility] OR;  Service: Orthopedics;  Laterality: Left;    MANIPULATION OF SHOULDER JOINT Left 7/24/2023    Procedure: MANIPULATION, SHOULDER;  Surgeon: Michael Ramirez MD;  Location: [facility] OR;  Service: Orthopedics;  Laterality: Left;    PARTIAL ARTHROPLASTY OF SHOULDER Left 6/16/2022    Procedure: HEMIARTHROPLASTY, SHOULDER;  Surgeon: Michael Ramirez MD;  Location: [facility] OR;  Service: Orthopedics;  Laterality: Left;       Patient's Medications   New Prescriptions    No medications on file   Previous Medications    CYPROHEPTADINE (PERIACTIN) 4 MG TABLET    Take 4 mg by mouth 2 (two) times daily as needed.    DIAZEPAM (VALIUM) 2 MG TABLET    Take 1 tablet (2 mg total) by mouth As instructed for Anxiety (take 1 tablet 60 minutes prior to MRI, take second just before if still feeling anxious).    ERGOCALCIFEROL (ERGOCALCIFEROL) 50,000 UNIT CAP    Take 1 capsule by mouth every 7 days.    FAMOTIDINE (PEPCID) 20 MG TABLET    TAKE 1 TABLET TWICE A DAY FOR 30 DAYS    HYDROXYZINE HCL (ATARAX) 25 MG TABLET    Take 1 tablet 3 times a day by oral route as needed.    MEGESTROL (MEGACE) 20 MG TAB    Take 1 tablet by mouth every morning.    MELOXICAM (MOBIC) 15 MG TABLET        NAPROXEN (NAPROSYN) 500 MG TABLET        NAPROXEN (NAPROSYN) 500 MG TABLET    Take 1 tablet (500 mg total) by mouth 2 (two) times daily  with meals.    TIZANIDINE (ZANAFLEX) 2 MG TABLET       Modified Medications    No medications on file   Discontinued Medications    No medications on file       Family History   Problem Relation Name Age of Onset    Birth defects Neg Hx         Review of patient's allergies indicates:   Allergen Reactions    Duloxetine Other (See Comments)         Objective:      Physical Exam  Patient is alert and oriented, no distress. Skin is intact. Neuro is normal with no focal motor or sensory findings.    Cervical exam is unremarkable. Intact cervical ROM. Negative Spurling's test    Physical Exam:  RIGHT    LEFT    Scap Dyskinesis/Winging (-)    (-)    Tenderness:          Greater Tuberosity             (-)    (-)  Bicipital Groove  (-)    (-)  AC joint   (-)    (-)  Other:     ROM:  Forward Elevation 150    150  Abduction  100    100  ER (at side)  40    40    Strength:   Supraspinatus  5/5    5/5  Infraspinatus  5/5    5/5  Subscap / IR  5/5    5/5     Special Tests:   Neer:    +    (-)   Jane:   +    (-)   SS Stress:   +    (-)   Bear Hug:   (-)    (-)   [place]'s:   +    (-)   Resisted Thrower's:   +    (-)   Speed's   (-)    (-)   Cross Arm Abduction:  (-)    (-)    Neurovascular examination  - Motor grossly intact bilaterally to shoulder abduction, elbow flexion and extension, wrist flexion and extension, and intrinsic hand musculature  - Sensation intact to light touch bilaterally in axillary, median, radial, and ulnar distributions  - Symmetrical radial pulses      Imaging:    XR Results:    X-ray Shoulder 2 or More Views Right  Narrative: EXAMINATION:  XR SHOULDER COMPLETE 2 OR MORE VIEWS RIGHT    CLINICAL HISTORY:  Pain in right shoulder    TECHNIQUE:  Two or three views of the right shoulder were preformed.    COMPARISON:  01/08/2025    FINDINGS:  There is significant mixed sclerosis and lucency seen within the humeral head with loss of the normal cortex along the articular surface of the humeral head and a  curvilinear ossification seen inferior to the glenohumeral joint measuring approximately 18 mm consistent with a displaced fragment secondary to AVN.  No AC joint arthropathy.  Impression: 1.  As above    Electronically signed by: Lennox De Paz DO  Date:    07/23/2025  Time:    08:46       MRI Results:  Results for orders placed during the hospital encounter of 01/16/25    MRI Shoulder Without Contrast Right    Narrative  EXAMINATION:  MRI SHOULDER WITHOUT CONTRAST RIGHT    CLINICAL HISTORY:  Aseptic necrosis, shoulder;  Idiopathic aseptic necrosis of unspecified shoulder    TECHNIQUE:  Multisequence, multiplanar MR imaging of the shoulder performed without contrast.    COMPARISON:  05/20/2022 MRI shoulder; recent radiograph    FINDINGS:  Note that shoulder positioning/rotation is vastly different from 05/20/2022 study.    Rotator cuff:    Supraspinatus: Intact    Infraspinatus: Intact    Subscapularis: Intact    Teres minor: Intact    Muscle bulk is maintained.    Labrum: No discrete tear    Biceps: Long head biceps tendon is intact.    Cartilage/bone: Interval progression of humeral head osteonecrosis changes including progression of cortical irregularity and flattening.  Overlying high-grade chondromalacia/cartilage loss noted.    Acromioclavicular joint:Unremarkable    Miscellaneous: No significant joint effusion.  Subacromial/subdeltoid bursal fluid present.    Impression  Progression of humeral head osteonecrosis/AVN changes.      Electronically signed by: Saul Rose MD  Date:    01/17/2025  Time:    08:09      CT Results:  No results found for this or any previous visit.        Physician read: I agree with the above impression.    Assessment/Plan:   Miriam Jett is a 29 y.o. female with right shoulder avascular necrosis      Plan:    She has known avascular necrosis of her right humeral head.  There is interval progression from previous x-rays taken in January.  Clinically, she is more  symptomatic.    The patient has tried considerable conservative treatment in the form of Rest, activity modification, right glenohumeral CSI (4/16/25), ibuprofen, and Naproxen. Despite these interventions, she continues to experience symptoms on a daily basis that limit her activities of daily living and diminish her quality of life. Reviewed that definitive management of this condition would consist of operative treatment in the form of hemiarthroplasty.   I recommend proceeding with right shoulder hemiarthroplasty.    We reviewed the proposed procedure in detail, which included discussion of risks and benefits, techniques, and possible complications of the procedure. Risks include infection, bleeding, damage to artery and nerves, continual pain and possible stiffness, and blood clots. We reviewed the post-operative restrictions, recovery period, and rehabilitation.  All patient questions were answered. Despite the risks, she elected to proceed with surgery and the consent was freely signed.  At least 10 minutes were spent instructing the patient in home care following surgery including, but not limited to: sling use, sleeping, hygiene, post-operative exercises, preventing post-operative complications, etc.  All questions were answered.  This service was performed under the direction of Michael Ramirez MD.  CPT 16843-YU.  Follow up with me 10-14 days after surgery            Michael Ramirez MD    I, Flakito Sommer, acted as a scribe for Michael Ramirez MD for the duration of this office visit.

## 2025-07-23 ENCOUNTER — OFFICE VISIT (OUTPATIENT)
Dept: SPORTS MEDICINE | Facility: CLINIC | Age: 30
End: 2025-07-23
Payer: MEDICAID

## 2025-07-23 ENCOUNTER — HOSPITAL ENCOUNTER (OUTPATIENT)
Dept: RADIOLOGY | Facility: HOSPITAL | Age: 30
Discharge: HOME | End: 2025-07-23
Attending: STUDENT IN AN ORGANIZED HEALTH CARE EDUCATION/TRAINING PROGRAM
Payer: MEDICAID

## 2025-07-23 VITALS — BODY MASS INDEX: 26.68 KG/M2 | WEIGHT: 170 LBS | HEIGHT: 67 IN

## 2025-07-23 DIAGNOSIS — M25.511 RIGHT SHOULDER PAIN, UNSPECIFIED CHRONICITY: ICD-10-CM

## 2025-07-23 DIAGNOSIS — Z01.818 PREOPERATIVE CLEARANCE: ICD-10-CM

## 2025-07-23 DIAGNOSIS — M19.211 OTHER SECONDARY OSTEOARTHRITIS OF RIGHT SHOULDER: ICD-10-CM

## 2025-07-23 DIAGNOSIS — M87.019 AVASCULAR NECROSIS OF BONE OF SHOULDER: Primary | ICD-10-CM

## 2025-07-23 PROCEDURE — 99214 OFFICE O/P EST MOD 30 MIN: CPT | Mod: PBBFAC,25 | Performed by: STUDENT IN AN ORGANIZED HEALTH CARE EDUCATION/TRAINING PROGRAM

## 2025-07-23 PROCEDURE — 99999 PR PBB SHADOW E&M-EST. PATIENT-LVL IV: CPT | Mod: PBBFAC,,, | Performed by: STUDENT IN AN ORGANIZED HEALTH CARE EDUCATION/TRAINING PROGRAM

## 2025-07-23 PROCEDURE — 73030 X-RAY EXAM OF SHOULDER: CPT | Mod: 26,RT,, | Performed by: RADIOLOGY

## 2025-07-23 PROCEDURE — 73030 X-RAY EXAM OF SHOULDER: CPT | Mod: TC,RT

## 2025-07-23 PROCEDURE — 97110 THERAPEUTIC EXERCISES: CPT | Mod: PBBFAC | Performed by: STUDENT IN AN ORGANIZED HEALTH CARE EDUCATION/TRAINING PROGRAM

## 2025-07-23 NOTE — PATIENT INSTRUCTIONS
In preparation for you upcoming surgery, here are some things to keep in mind leading up to and after your surgery:    PRE-ADMIT APPOINTMENT  We have a department that will review your chart for any health conditions or other issues to make sure that it is safe from an anesthesia standpoint to undergo surgery.   If they have any concerns they may schedule an appointment for you to be evaluated and have any further testing done. This may include but is not limited to bloodwork, EKG, chest X-ray, referral to cardiologist for additional testing/clearance, referral to pulmonologist for additional testing/clearance, or referral to any other needed specialties for additional testing/clearance.  If only basic testing is needed this appointment may be scheduled a few days before your actually surgery. Unless any new concerns or issues arise, this typically does not affect the date of your surgery.   If you are on any medications, at this appointment they will also review and discuss/provide instructions on when to stop or start taking these medications before and after surgery.   INSTRUCTIONS FOR SURGERY   The day before your surgery (usually between 1-3 PM), someone will call you to give you the scheduled time for you surgery, what time you need to arrive, what time to not eat/drink past, and any other final instructions  If your surgery is scheduled for Monday then they will call you on Friday afternoon.   If you do not receive this call please reach out to our office before the end of the day (4 PM) so that we may assist you.   HOME EXERCISES AFTER SURGERY        PHYSICAL THERAPY  A referral for physical therapy will be placed to the location we discussed today. If you would like to make changes to this, please give us a call or send us a [employer] message as soon as possible so we may coordinate these changes.   We will send that referral to the desired location and also provide them with the rehabilitation protocol that  will be followed to make sure you are progressed appropriately after surgery.   They will also be provided with the start date of your PT after surgery. You will likely start PT prior to you first post-op appointment. Depending on the procedure you have performed this may be as soon as 3 days after surgery or up to 2 weeks after surgery. Below are a few examples of some common time frames for certain procedures:  Rotator cuff surgery- 10-11 days after surgery  Shoulder labrum surgery- 3-5 days after surgery   Shoulder replacement- 3-5 days after surgery  ACL Reconstruction- 3-5 days after surgery  Hip scope- 3-5 days after surgery  Distal biceps tendon repair- once post-op splint is removed/per Dr. Ramirez recommendation  Fracture- once post-op splint is removed/per Dr. Ramirez recommendation   If you ever have any problems or issues with your physical therapy or wish to change locations at any point, please let us know and we are happy to assist with that change.   POST-OP APPOINTMENTS  Post-op appointments will be scheduled at 2 weeks, 6 weeks, and 3 months from the date of your surgery. We will schedule these appointments prior to your surgery and they will be able to be viewed in [employer].  2 week post-op appointment  This appointment will be with Monique Saha who is Dr. Ramirez's physician assistant (PA). At this appointment we will be removing your sutures, checking for any signs of infection or other concerning issues, and checking to make sure your range of motion is appropriate.   Dr. Ramirez will also be in clinic on the same day as this appointment and can step in to see you if there is anything of concern that needs to be addressed.   You may also have X-rays scheduled at this appointment, depending on the procedure you had performed (shoulder replacement, ACL surgery, surgery for a fracture, etc.)  6 week post-op appointment  This appointment will be with Dr. Ramirez. He will make sure  everything is progressing well and may also review your pictures from surgery if any were taken.   You may also have X-rays at this appointment, depending on the procedure you had performed (shoulder replacement, surgery for a fracture, etc.)  3 month post-op appointment  This appointment will be with Dr. Ramirez. He will make sure you continue to progress appropriately.  Any follow-ups after this visit will be at the discretion of Dr. Ramirez based upon your recovery/progress, procedure performed, etc.   FMLA OR SHORT TERM DISABILITY PAPERWORK  If you have any paperwork that needs to be filled out in regards to FMLA leave or short term disability leave, you may drop these forms off at the [facility] or attachment them to a patient message via [employer].   These forms will be filled out within a few days of your actual surgery being performed in case your surgery date is rescheduled or changed and the forms would need to potentially be filled out again.   Please try to provide these forms to our office in a timely manner, as we ask for 5-7 business days for them to be completed.

## 2025-08-04 ENCOUNTER — HOSPITAL ENCOUNTER (EMERGENCY)
Facility: HOSPITAL | Age: 30
Discharge: HOME | End: 2025-08-04
Attending: EMERGENCY MEDICINE
Payer: MEDICAID

## 2025-08-04 VITALS
SYSTOLIC BLOOD PRESSURE: 166 MMHG | HEART RATE: 82 BPM | TEMPERATURE: 98 F | RESPIRATION RATE: 22 BRPM | BODY MASS INDEX: 25.79 KG/M2 | DIASTOLIC BLOOD PRESSURE: 85 MMHG | HEIGHT: 67 IN | OXYGEN SATURATION: 100 % | WEIGHT: 164.31 LBS

## 2025-08-04 DIAGNOSIS — F41.9 ANXIETY: Primary | ICD-10-CM

## 2025-08-04 PROCEDURE — 99283 EMERGENCY DEPT VISIT LOW MDM: CPT

## 2025-08-04 RX ORDER — LORAZEPAM 1 MG/1
0.5 TABLET ORAL EVERY 8 HOURS PRN
Qty: 8 TABLET | Refills: 0 | Status: SHIPPED | OUTPATIENT
Start: 2025-08-04 | End: 2025-09-03

## 2025-08-07 ENCOUNTER — PATIENT MESSAGE (OUTPATIENT)
Dept: PREADMISSION TESTING | Facility: HOSPITAL | Age: 30
End: 2025-08-07
Payer: MEDICAID

## 2025-08-07 RX ORDER — METRONIDAZOLE 500 MG/1
TABLET ORAL
COMMUNITY
Start: 2025-05-16

## 2025-08-11 PROBLEM — M25.511 ACUTE PAIN OF RIGHT SHOULDER: Status: RESOLVED | Noted: 2025-06-24 | Resolved: 2025-08-11

## 2025-08-12 ENCOUNTER — ANESTHESIA EVENT (OUTPATIENT)
Dept: SURGERY | Facility: HOSPITAL | Age: 30
End: 2025-08-12
Payer: MEDICAID

## 2025-08-13 ENCOUNTER — PATIENT MESSAGE (OUTPATIENT)
Dept: RESPIRATORY THERAPY | Facility: HOSPITAL | Age: 30
End: 2025-08-13
Payer: MEDICAID

## 2025-08-14 ENCOUNTER — ANESTHESIA (OUTPATIENT)
Dept: SURGERY | Facility: HOSPITAL | Age: 30
End: 2025-08-14
Payer: MEDICAID

## 2025-08-14 ENCOUNTER — HOSPITAL ENCOUNTER (OUTPATIENT)
Dept: RADIOLOGY | Facility: HOSPITAL | Age: 30
Discharge: HOME | End: 2025-08-14
Attending: STUDENT IN AN ORGANIZED HEALTH CARE EDUCATION/TRAINING PROGRAM | Admitting: STUDENT IN AN ORGANIZED HEALTH CARE EDUCATION/TRAINING PROGRAM
Payer: MEDICAID

## 2025-08-14 ENCOUNTER — HOSPITAL ENCOUNTER (OUTPATIENT)
Facility: HOSPITAL | Age: 30
Discharge: HOME | End: 2025-08-14
Attending: STUDENT IN AN ORGANIZED HEALTH CARE EDUCATION/TRAINING PROGRAM | Admitting: STUDENT IN AN ORGANIZED HEALTH CARE EDUCATION/TRAINING PROGRAM
Payer: MEDICAID

## 2025-08-14 DIAGNOSIS — M87.019 AVASCULAR NECROSIS OF BONE OF SHOULDER: Primary | ICD-10-CM

## 2025-08-14 LAB
B-HCG UR QL: NEGATIVE
CTP QC/QA: YES

## 2025-08-14 PROCEDURE — 37000009 HC ANESTHESIA EA ADD 15 MINS: Performed by: STUDENT IN AN ORGANIZED HEALTH CARE EDUCATION/TRAINING PROGRAM

## 2025-08-14 PROCEDURE — 37000008 HC ANESTHESIA 1ST 15 MINUTES: Performed by: STUDENT IN AN ORGANIZED HEALTH CARE EDUCATION/TRAINING PROGRAM

## 2025-08-14 PROCEDURE — 23430 REPAIR BICEPS TENDON: CPT | Mod: 51,RT,, | Performed by: STUDENT IN AN ORGANIZED HEALTH CARE EDUCATION/TRAINING PROGRAM

## 2025-08-14 PROCEDURE — 36000711: Performed by: STUDENT IN AN ORGANIZED HEALTH CARE EDUCATION/TRAINING PROGRAM

## 2025-08-14 PROCEDURE — C1769 GUIDE WIRE: HCPCS | Performed by: STUDENT IN AN ORGANIZED HEALTH CARE EDUCATION/TRAINING PROGRAM

## 2025-08-14 PROCEDURE — 27200750 HC INSULATED NEEDLE/ STIMUPLEX: Performed by: ANESTHESIOLOGY

## 2025-08-14 PROCEDURE — 63600175 PHARM REV CODE 636 W HCPCS: Performed by: STUDENT IN AN ORGANIZED HEALTH CARE EDUCATION/TRAINING PROGRAM

## 2025-08-14 PROCEDURE — 63600175 PHARM REV CODE 636 W HCPCS: Performed by: NURSE ANESTHETIST, CERTIFIED REGISTERED

## 2025-08-14 PROCEDURE — 23470 RECONSTRUCT SHOULDER JOINT: CPT | Mod: RT,,, | Performed by: STUDENT IN AN ORGANIZED HEALTH CARE EDUCATION/TRAINING PROGRAM

## 2025-08-14 PROCEDURE — 25000003 PHARM REV CODE 250: Performed by: NURSE ANESTHETIST, CERTIFIED REGISTERED

## 2025-08-14 PROCEDURE — 64415 NJX AA&/STRD BRCH PLXS IMG: CPT | Performed by: ANESTHESIOLOGY

## 2025-08-14 PROCEDURE — 64450 NJX AA&/STRD OTHER PN/BRANCH: CPT | Performed by: STUDENT IN AN ORGANIZED HEALTH CARE EDUCATION/TRAINING PROGRAM

## 2025-08-14 PROCEDURE — 71000015 HC POSTOP RECOV 1ST HR: Performed by: STUDENT IN AN ORGANIZED HEALTH CARE EDUCATION/TRAINING PROGRAM

## 2025-08-14 PROCEDURE — 71000033 HC RECOVERY, INTIAL HOUR: Performed by: STUDENT IN AN ORGANIZED HEALTH CARE EDUCATION/TRAINING PROGRAM

## 2025-08-14 PROCEDURE — C1713 ANCHOR/SCREW BN/BN,TIS/BN: HCPCS | Performed by: STUDENT IN AN ORGANIZED HEALTH CARE EDUCATION/TRAINING PROGRAM

## 2025-08-14 PROCEDURE — 25000003 PHARM REV CODE 250: Performed by: ANESTHESIOLOGY

## 2025-08-14 PROCEDURE — 27201423 OPTIME MED/SURG SUP & DEVICES STERILE SUPPLY: Performed by: STUDENT IN AN ORGANIZED HEALTH CARE EDUCATION/TRAINING PROGRAM

## 2025-08-14 PROCEDURE — C1776 JOINT DEVICE (IMPLANTABLE): HCPCS | Performed by: STUDENT IN AN ORGANIZED HEALTH CARE EDUCATION/TRAINING PROGRAM

## 2025-08-14 PROCEDURE — 23470 RECONSTRUCT SHOULDER JOINT: CPT | Mod: AS,RT,, | Performed by: NURSE PRACTITIONER

## 2025-08-14 PROCEDURE — 27200703 HC ULTRASOUND NDL GUIDE: Performed by: ANESTHESIOLOGY

## 2025-08-14 PROCEDURE — 36000710: Performed by: STUDENT IN AN ORGANIZED HEALTH CARE EDUCATION/TRAINING PROGRAM

## 2025-08-14 PROCEDURE — 63600175 PHARM REV CODE 636 W HCPCS: Performed by: ANESTHESIOLOGY

## 2025-08-14 PROCEDURE — 23430 REPAIR BICEPS TENDON: CPT | Mod: AS,51,RT, | Performed by: NURSE PRACTITIONER

## 2025-08-14 PROCEDURE — 73020 X-RAY EXAM OF SHOULDER: CPT | Mod: TC,RT

## 2025-08-14 PROCEDURE — 81025 URINE PREGNANCY TEST: CPT | Performed by: STUDENT IN AN ORGANIZED HEALTH CARE EDUCATION/TRAINING PROGRAM

## 2025-08-14 DEVICE — SYS IMPL PROXIMAL TENODESIS: Type: IMPLANTABLE DEVICE | Site: SHOULDER | Status: FUNCTIONAL

## 2025-08-14 DEVICE — IMPLANTABLE DEVICE: Type: IMPLANTABLE DEVICE | Site: SHOULDER | Status: FUNCTIONAL

## 2025-08-14 RX ORDER — ONDANSETRON HYDROCHLORIDE 2 MG/ML
INJECTION, SOLUTION INTRAVENOUS
Status: DISCONTINUED | OUTPATIENT
Start: 2025-08-14 | End: 2025-08-14

## 2025-08-14 RX ORDER — HYDROCODONE BITARTRATE AND ACETAMINOPHEN 5; 325 MG/1; MG/1
1 TABLET ORAL
Status: DISCONTINUED | OUTPATIENT
Start: 2025-08-14 | End: 2025-08-14 | Stop reason: HOSPADM

## 2025-08-14 RX ORDER — ALBUTEROL SULFATE 0.83 MG/ML
2.5 SOLUTION RESPIRATORY (INHALATION) EVERY 4 HOURS PRN
Status: DISCONTINUED | OUTPATIENT
Start: 2025-08-14 | End: 2025-08-14 | Stop reason: HOSPADM

## 2025-08-14 RX ORDER — TRANEXAMIC ACID 10 MG/ML
INJECTION, SOLUTION INTRAVENOUS
Status: DISCONTINUED | OUTPATIENT
Start: 2025-08-14 | End: 2025-08-14

## 2025-08-14 RX ORDER — LIDOCAINE HYDROCHLORIDE 20 MG/ML
INJECTION INTRAVENOUS
Status: DISCONTINUED | OUTPATIENT
Start: 2025-08-14 | End: 2025-08-14

## 2025-08-14 RX ORDER — DEXMEDETOMIDINE HYDROCHLORIDE 100 UG/ML
INJECTION, SOLUTION INTRAVENOUS
Status: DISCONTINUED | OUTPATIENT
Start: 2025-08-14 | End: 2025-08-14

## 2025-08-14 RX ORDER — DIPHENHYDRAMINE HYDROCHLORIDE 50 MG/ML
25 INJECTION, SOLUTION INTRAMUSCULAR; INTRAVENOUS EVERY 6 HOURS PRN
Status: DISCONTINUED | OUTPATIENT
Start: 2025-08-14 | End: 2025-08-14 | Stop reason: HOSPADM

## 2025-08-14 RX ORDER — FENTANYL CITRATE 50 UG/ML
INJECTION, SOLUTION INTRAMUSCULAR; INTRAVENOUS
Status: DISCONTINUED | OUTPATIENT
Start: 2025-08-14 | End: 2025-08-14

## 2025-08-14 RX ORDER — MIDAZOLAM HYDROCHLORIDE 1 MG/ML
INJECTION INTRAMUSCULAR; INTRAVENOUS
Status: DISCONTINUED | OUTPATIENT
Start: 2025-08-14 | End: 2025-08-14

## 2025-08-14 RX ORDER — CEFAZOLIN SODIUM 1 G/3ML
1 INJECTION, POWDER, FOR SOLUTION INTRAMUSCULAR; INTRAVENOUS ONCE
Status: COMPLETED | OUTPATIENT
Start: 2025-08-14 | End: 2025-08-14

## 2025-08-14 RX ORDER — TRANEXAMIC ACID 10 MG/ML
INJECTION, SOLUTION INTRAVENOUS
Status: COMPLETED
Start: 2025-08-14 | End: 2025-08-14

## 2025-08-14 RX ORDER — ACETAMINOPHEN 10 MG/ML
INJECTION, SOLUTION INTRAVENOUS
Status: DISCONTINUED | OUTPATIENT
Start: 2025-08-14 | End: 2025-08-14

## 2025-08-14 RX ORDER — BUPIVACAINE 13.3 MG/ML
INJECTION, SUSPENSION, LIPOSOMAL INFILTRATION
Status: DISCONTINUED | OUTPATIENT
Start: 2025-08-14 | End: 2025-08-14

## 2025-08-14 RX ORDER — NAPROXEN 500 MG/1
500 TABLET ORAL 2 TIMES DAILY WITH MEALS
Qty: 60 TABLET | Refills: 0 | Status: SHIPPED | OUTPATIENT
Start: 2025-08-14 | End: 2025-09-13

## 2025-08-14 RX ORDER — ACETAMINOPHEN 500 MG
1000 TABLET ORAL EVERY 8 HOURS PRN
Qty: 60 TABLET | Refills: 0 | Status: SHIPPED | OUTPATIENT
Start: 2025-08-14

## 2025-08-14 RX ORDER — TRAMADOL HYDROCHLORIDE 50 MG/1
50 TABLET, FILM COATED ORAL
Qty: 36 TABLET | Refills: 0 | Status: SHIPPED | OUTPATIENT
Start: 2025-08-14

## 2025-08-14 RX ORDER — HYDROMORPHONE HYDROCHLORIDE 2 MG/ML
0.2 INJECTION, SOLUTION INTRAMUSCULAR; INTRAVENOUS; SUBCUTANEOUS EVERY 5 MIN PRN
Status: DISCONTINUED | OUTPATIENT
Start: 2025-08-14 | End: 2025-08-14 | Stop reason: HOSPADM

## 2025-08-14 RX ORDER — FENTANYL CITRATE 50 UG/ML
25 INJECTION, SOLUTION INTRAMUSCULAR; INTRAVENOUS EVERY 5 MIN PRN
Status: DISCONTINUED | OUTPATIENT
Start: 2025-08-14 | End: 2025-08-14 | Stop reason: HOSPADM

## 2025-08-14 RX ORDER — SUCCINYLCHOLINE CHLORIDE 20 MG/ML
INJECTION INTRAMUSCULAR; INTRAVENOUS
Status: DISCONTINUED | OUTPATIENT
Start: 2025-08-14 | End: 2025-08-14

## 2025-08-14 RX ORDER — BUPIVACAINE 13.3 MG/ML
INJECTION, SUSPENSION, LIPOSOMAL INFILTRATION
Status: COMPLETED
Start: 2025-08-14 | End: 2025-08-14

## 2025-08-14 RX ORDER — ONDANSETRON HYDROCHLORIDE 2 MG/ML
4 INJECTION, SOLUTION INTRAVENOUS ONCE AS NEEDED
Status: DISCONTINUED | OUTPATIENT
Start: 2025-08-14 | End: 2025-08-14 | Stop reason: HOSPADM

## 2025-08-14 RX ORDER — PHENYLEPHRINE HYDROCHLORIDE 10 MG/ML
INJECTION INTRAVENOUS
Status: DISCONTINUED | OUTPATIENT
Start: 2025-08-14 | End: 2025-08-14

## 2025-08-14 RX ORDER — CEFAZOLIN SODIUM 1 G/3ML
INJECTION, POWDER, FOR SOLUTION INTRAMUSCULAR; INTRAVENOUS
Status: DISCONTINUED | OUTPATIENT
Start: 2025-08-14 | End: 2025-08-14

## 2025-08-14 RX ORDER — ASPIRIN 81 MG/1
81 TABLET ORAL 2 TIMES DAILY
Qty: 28 TABLET | Refills: 0 | Status: SHIPPED | OUTPATIENT
Start: 2025-08-14 | End: 2025-08-28

## 2025-08-14 RX ORDER — LIDOCAINE HYDROCHLORIDE 10 MG/ML
INJECTION, SOLUTION EPIDURAL; INFILTRATION; INTRACAUDAL; PERINEURAL
Status: COMPLETED | OUTPATIENT
Start: 2025-08-14 | End: 2025-08-14

## 2025-08-14 RX ORDER — DEXAMETHASONE SODIUM PHOSPHATE 4 MG/ML
INJECTION, SOLUTION INTRA-ARTICULAR; INTRALESIONAL; INTRAMUSCULAR; INTRAVENOUS; SOFT TISSUE
Status: DISCONTINUED | OUTPATIENT
Start: 2025-08-14 | End: 2025-08-14

## 2025-08-14 RX ORDER — OXYCODONE HYDROCHLORIDE 5 MG/1
5 TABLET ORAL EVERY 4 HOURS PRN
Qty: 36 TABLET | Refills: 0 | Status: SHIPPED | OUTPATIENT
Start: 2025-08-14

## 2025-08-14 RX ORDER — BUPIVACAINE HYDROCHLORIDE 5 MG/ML
INJECTION, SOLUTION EPIDURAL; INTRACAUDAL; PERINEURAL
Status: COMPLETED | OUTPATIENT
Start: 2025-08-14 | End: 2025-08-14

## 2025-08-14 RX ORDER — PROPOFOL 10 MG/ML
VIAL (ML) INTRAVENOUS
Status: DISCONTINUED | OUTPATIENT
Start: 2025-08-14 | End: 2025-08-14

## 2025-08-14 RX ORDER — ROCURONIUM BROMIDE 10 MG/ML
INJECTION, SOLUTION INTRAVENOUS
Status: DISCONTINUED | OUTPATIENT
Start: 2025-08-14 | End: 2025-08-14

## 2025-08-14 RX ADMIN — ONDANSETRON 4 MG: 2 INJECTION INTRAMUSCULAR; INTRAVENOUS at 11:08

## 2025-08-14 RX ADMIN — SUCCINYLCHOLINE CHLORIDE 100 MG: 20 INJECTION, SOLUTION INTRAMUSCULAR; INTRAVENOUS; PARENTERAL at 09:08

## 2025-08-14 RX ADMIN — ROCURONIUM BROMIDE 30 MG: 10 SOLUTION INTRAVENOUS at 09:08

## 2025-08-14 RX ADMIN — DEXMEDETOMIDINE HYDROCHLORIDE 4 MCG: 100 INJECTION, SOLUTION INTRAVENOUS at 10:08

## 2025-08-14 RX ADMIN — MIDAZOLAM HYDROCHLORIDE 2 MG: 1 INJECTION, SOLUTION INTRAMUSCULAR; INTRAVENOUS at 09:08

## 2025-08-14 RX ADMIN — BUPIVACAINE 133 MG: 13.3 INJECTION, SUSPENSION, LIPOSOMAL INFILTRATION at 09:08

## 2025-08-14 RX ADMIN — LIDOCAINE HYDROCHLORIDE 2 MG: 10 INJECTION, SOLUTION EPIDURAL; INFILTRATION; INTRACAUDAL; PERINEURAL at 09:08

## 2025-08-14 RX ADMIN — TRANEXAMIC ACID 1000 MG: 10 INJECTION, SOLUTION INTRAVENOUS at 10:08

## 2025-08-14 RX ADMIN — DEXMEDETOMIDINE HYDROCHLORIDE 8 MCG: 100 INJECTION, SOLUTION INTRAVENOUS at 11:08

## 2025-08-14 RX ADMIN — BUPIVACAINE HYDROCHLORIDE 10 ML: 5 INJECTION, SOLUTION EPIDURAL; INTRACAUDAL; PERINEURAL at 09:08

## 2025-08-14 RX ADMIN — PROPOFOL 150 MG: 10 INJECTION, EMULSION INTRAVENOUS at 09:08

## 2025-08-14 RX ADMIN — SUGAMMADEX 200 MG: 100 INJECTION, SOLUTION INTRAVENOUS at 11:08

## 2025-08-14 RX ADMIN — ACETAMINOPHEN 1000 MG: 10 INJECTION, SOLUTION INTRAVENOUS at 10:08

## 2025-08-14 RX ADMIN — SODIUM CHLORIDE, POTASSIUM CHLORIDE, SODIUM LACTATE AND CALCIUM CHLORIDE: 600; 310; 30; 20 INJECTION, SOLUTION INTRAVENOUS at 09:08

## 2025-08-14 RX ADMIN — PHENYLEPHRINE HYDROCHLORIDE 100 MCG: 10 INJECTION INTRAVENOUS at 10:08

## 2025-08-14 RX ADMIN — HYDROCODONE BITARTRATE AND ACETAMINOPHEN 1 TABLET: 5; 325 TABLET ORAL at 11:08

## 2025-08-14 RX ADMIN — FENTANYL CITRATE 50 MCG: 50 INJECTION, SOLUTION INTRAMUSCULAR; INTRAVENOUS at 09:08

## 2025-08-14 RX ADMIN — FENTANYL CITRATE 50 MCG: 50 INJECTION, SOLUTION INTRAMUSCULAR; INTRAVENOUS at 10:08

## 2025-08-14 RX ADMIN — DEXAMETHASONE SODIUM PHOSPHATE 8 MG: 4 INJECTION, SOLUTION INTRA-ARTICULAR; INTRALESIONAL; INTRAMUSCULAR; INTRAVENOUS; SOFT TISSUE at 09:08

## 2025-08-14 RX ADMIN — PROPOFOL 100 MG: 10 INJECTION, EMULSION INTRAVENOUS at 11:08

## 2025-08-14 RX ADMIN — TRANEXAMIC ACID 1000 MG: 10 INJECTION, SOLUTION INTRAVENOUS at 09:08

## 2025-08-14 RX ADMIN — ROCURONIUM BROMIDE 10 MG: 10 SOLUTION INTRAVENOUS at 10:08

## 2025-08-14 RX ADMIN — DEXMEDETOMIDINE HYDROCHLORIDE 8 MCG: 100 INJECTION, SOLUTION INTRAVENOUS at 10:08

## 2025-08-14 RX ADMIN — ROCURONIUM BROMIDE 10 MG: 10 SOLUTION INTRAVENOUS at 09:08

## 2025-08-14 RX ADMIN — CEFAZOLIN 1 G: 1 INJECTION, POWDER, FOR SOLUTION INTRAMUSCULAR; INTRAVENOUS at 11:08

## 2025-08-14 RX ADMIN — FENTANYL CITRATE 50 MCG: 50 INJECTION, SOLUTION INTRAMUSCULAR; INTRAVENOUS at 11:08

## 2025-08-14 RX ADMIN — CEFAZOLIN 2 G: 330 INJECTION, POWDER, FOR SOLUTION INTRAMUSCULAR; INTRAVENOUS at 09:08

## 2025-08-14 RX ADMIN — LIDOCAINE HYDROCHLORIDE 75 MG: 20 INJECTION INTRAVENOUS at 09:08

## 2025-08-15 VITALS
HEART RATE: 61 BPM | TEMPERATURE: 98 F | DIASTOLIC BLOOD PRESSURE: 67 MMHG | BODY MASS INDEX: 24.86 KG/M2 | WEIGHT: 158.38 LBS | SYSTOLIC BLOOD PRESSURE: 116 MMHG | HEIGHT: 67 IN | RESPIRATION RATE: 19 BRPM | OXYGEN SATURATION: 100 %

## 2025-08-18 ENCOUNTER — CLINICAL SUPPORT (OUTPATIENT)
Dept: REHABILITATION | Facility: HOSPITAL | Age: 30
End: 2025-08-18
Payer: MEDICAID

## 2025-08-18 DIAGNOSIS — M25.611 DECREASED RANGE OF MOTION OF RIGHT SHOULDER: Primary | ICD-10-CM

## 2025-08-18 DIAGNOSIS — R29.898 WEAKNESS OF RIGHT SHOULDER: ICD-10-CM

## 2025-08-18 PROCEDURE — 97161 PT EVAL LOW COMPLEX 20 MIN: CPT | Mod: PN

## 2025-08-18 PROCEDURE — 97110 THERAPEUTIC EXERCISES: CPT | Mod: PN

## 2025-08-18 PROCEDURE — 97112 NEUROMUSCULAR REEDUCATION: CPT | Mod: PN

## 2025-08-20 DIAGNOSIS — M87.019 AVASCULAR NECROSIS OF BONE OF SHOULDER: ICD-10-CM

## 2025-08-21 ENCOUNTER — CLINICAL SUPPORT (OUTPATIENT)
Dept: REHABILITATION | Facility: HOSPITAL | Age: 30
End: 2025-08-21
Payer: MEDICAID

## 2025-08-25 ENCOUNTER — PATIENT MESSAGE (OUTPATIENT)
Dept: SPORTS MEDICINE | Facility: CLINIC | Age: 30
End: 2025-08-25
Payer: MEDICAID

## 2025-08-26 DIAGNOSIS — M87.019 AVASCULAR NECROSIS OF BONE OF SHOULDER: ICD-10-CM

## 2025-08-26 DIAGNOSIS — Z98.890 POST-OPERATIVE STATE: Primary | ICD-10-CM

## 2025-08-26 RX ORDER — OXYCODONE HYDROCHLORIDE 5 MG/1
5 TABLET ORAL EVERY 6 HOURS PRN
Qty: 28 TABLET | Refills: 0 | Status: CANCELLED | OUTPATIENT
Start: 2025-08-26

## 2025-08-27 ENCOUNTER — OFFICE VISIT (OUTPATIENT)
Dept: SPORTS MEDICINE | Facility: CLINIC | Age: 30
End: 2025-08-27
Payer: MEDICAID

## 2025-08-27 ENCOUNTER — HOSPITAL ENCOUNTER (OUTPATIENT)
Dept: RADIOLOGY | Facility: HOSPITAL | Age: 30
Discharge: HOME | End: 2025-08-27
Attending: STUDENT IN AN ORGANIZED HEALTH CARE EDUCATION/TRAINING PROGRAM
Payer: MEDICAID

## 2025-08-27 ENCOUNTER — PATIENT MESSAGE (OUTPATIENT)
Dept: SPORTS MEDICINE | Facility: CLINIC | Age: 30
End: 2025-08-27

## 2025-08-27 DIAGNOSIS — M19.211 OTHER SECONDARY OSTEOARTHRITIS OF RIGHT SHOULDER: ICD-10-CM

## 2025-08-27 DIAGNOSIS — Z96.611 STATUS POST RIGHT SHOULDER HEMIARTHROPLASTY: Primary | ICD-10-CM

## 2025-08-27 DIAGNOSIS — M87.019 AVASCULAR NECROSIS OF BONE OF SHOULDER: ICD-10-CM

## 2025-08-27 PROCEDURE — 73030 X-RAY EXAM OF SHOULDER: CPT | Mod: 26,RT,, | Performed by: RADIOLOGY

## 2025-08-27 PROCEDURE — 99213 OFFICE O/P EST LOW 20 MIN: CPT | Mod: PBBFAC,25 | Performed by: STUDENT IN AN ORGANIZED HEALTH CARE EDUCATION/TRAINING PROGRAM

## 2025-08-27 PROCEDURE — 99024 POSTOP FOLLOW-UP VISIT: CPT | Mod: ,,, | Performed by: STUDENT IN AN ORGANIZED HEALTH CARE EDUCATION/TRAINING PROGRAM

## 2025-08-27 PROCEDURE — 73030 X-RAY EXAM OF SHOULDER: CPT | Mod: TC,RT

## 2025-08-27 PROCEDURE — 1159F MED LIST DOCD IN RCRD: CPT | Mod: CPTII,,, | Performed by: STUDENT IN AN ORGANIZED HEALTH CARE EDUCATION/TRAINING PROGRAM

## 2025-08-27 PROCEDURE — 1160F RVW MEDS BY RX/DR IN RCRD: CPT | Mod: CPTII,,, | Performed by: STUDENT IN AN ORGANIZED HEALTH CARE EDUCATION/TRAINING PROGRAM

## 2025-08-27 PROCEDURE — 99999 PR PBB SHADOW E&M-EST. PATIENT-LVL III: CPT | Mod: PBBFAC,,, | Performed by: STUDENT IN AN ORGANIZED HEALTH CARE EDUCATION/TRAINING PROGRAM

## 2025-08-27 RX ORDER — OXYCODONE HYDROCHLORIDE 5 MG/1
5 TABLET ORAL EVERY 6 HOURS PRN
Qty: 16 TABLET | Refills: 0 | Status: SHIPPED | OUTPATIENT
Start: 2025-08-27

## 2025-08-28 ENCOUNTER — CLINICAL SUPPORT (OUTPATIENT)
Dept: REHABILITATION | Facility: HOSPITAL | Age: 30
End: 2025-08-28
Payer: MEDICAID

## 2025-08-28 DIAGNOSIS — R29.898 WEAKNESS OF RIGHT SHOULDER: Primary | ICD-10-CM

## 2025-08-28 DIAGNOSIS — M25.611 DECREASED RANGE OF MOTION OF RIGHT SHOULDER: ICD-10-CM

## 2025-08-28 PROCEDURE — 97110 THERAPEUTIC EXERCISES: CPT | Mod: PN

## 2025-09-02 ENCOUNTER — CLINICAL SUPPORT (OUTPATIENT)
Dept: REHABILITATION | Facility: HOSPITAL | Age: 30
End: 2025-09-02
Payer: MEDICAID

## 2025-09-02 DIAGNOSIS — M25.611 DECREASED RANGE OF MOTION OF RIGHT SHOULDER: ICD-10-CM

## 2025-09-02 DIAGNOSIS — R29.898 WEAKNESS OF RIGHT SHOULDER: Primary | ICD-10-CM

## 2025-09-02 PROCEDURE — 97110 THERAPEUTIC EXERCISES: CPT | Mod: PN

## 2025-09-03 RX ORDER — OXYCODONE HYDROCHLORIDE 5 MG/1
5 TABLET ORAL EVERY 4 HOURS PRN
Qty: 36 TABLET | Refills: 0 | OUTPATIENT
Start: 2025-09-03

## (undated) DEVICE — DRAPE SURGICAL STERI IRRG PCH

## (undated) DEVICE — GLOVE SURG BIOGEL LATEX SZ 7.5

## (undated) DEVICE — SPONGE COTTON TRAY 4X4IN

## (undated) DEVICE — MANIFOLD 4 PORT

## (undated) DEVICE — IMPLANTABLE DEVICE
Type: IMPLANTABLE DEVICE | Site: SHOULDER | Status: NON-FUNCTIONAL
Removed: 2025-08-14

## (undated) DEVICE — STOCKINETTE TUBULAR 2PL 6 X 4

## (undated) DEVICE — TUBING PUMP ARTHROSCOPY STRL

## (undated) DEVICE — SUT VICRYL CP-1 VCP268H

## (undated) DEVICE — DRESSING XEROFORM FOIL PK 1X8

## (undated) DEVICE — APPLICATOR CHLORAPREP ORN 26ML

## (undated) DEVICE — PAD ABD 8X10 STERILE

## (undated) DEVICE — TOWEL OR DISP STRL BLUE 4/PK

## (undated) DEVICE — KIT IRR SUCTION HND PIECE

## (undated) DEVICE — ELECTRODE REM PLYHSV RETURN 9

## (undated) DEVICE — ADHESIVE MASTISOL VIAL 48/BX

## (undated) DEVICE — PROBE MULTI PORT RF 90 DEGREE

## (undated) DEVICE — GOWN SMARTGOWN LVL4 X-LONG XL

## (undated) DEVICE — DRILL BIT

## (undated) DEVICE — Device

## (undated) DEVICE — SUT FIBERWIRE #5 1/2 X 38

## (undated) DEVICE — SUT PROLENE 3-0 PS-1 BL 18

## (undated) DEVICE — SUT X425H ETHIBOND 1-0

## (undated) DEVICE — HOOD FLYTE PEELWY STERISHIELD

## (undated) DEVICE — KIT TRIMANO

## (undated) DEVICE — DRESSING AQUACEL AG 3.5X10IN

## (undated) DEVICE — DECANTER VIAL ASEPTIC TRANSFER

## (undated) DEVICE — TAPE SURG MEDIPORE 6X72IN

## (undated) DEVICE — DRAPE PLASTIC U 60X72

## (undated) DEVICE — TIP YANKAUERS BULB NO VENT

## (undated) DEVICE — DRAPE INCISE IOBAN 2 23X33IN

## (undated) DEVICE — GOWN POLY REINF BRTH SLV XL

## (undated) DEVICE — SUT 3-0 MONOCRYL PLUS PS-2

## (undated) DEVICE — SUT TI-CRON BLU 2 30 HOS-10

## (undated) DEVICE — DRAPE U SPLIT SHEET 54X76IN

## (undated) DEVICE — NDL SPINAL 18GX3.5 SPINOCAN

## (undated) DEVICE — SEE MEDLINE ITEM 157027

## (undated) DEVICE — SPACESUIT TOGA T5 ZIPPER PEEL

## (undated) DEVICE — DRAPE HIP PCH 112X137X89IN

## (undated) DEVICE — UNDERGLOVES BIOGEL PI SIZE 7.5

## (undated) DEVICE — STAPLER SKIN PROXIMATE WIDE

## (undated) DEVICE — SEE MEDLINE ITEM 157216

## (undated) DEVICE — SUT ETHILON 3/0 18IN PS-1

## (undated) DEVICE — STRIP MEDI WND CLSR 1/2X4IN

## (undated) DEVICE — SUT VICRYL PLUS 0 CT1 18IN

## (undated) DEVICE — NDL SAFETY 22G X 1.5 ECLIPSE

## (undated) DEVICE — BNDG COFLEX FOAM LF2 ST 4X5YD

## (undated) DEVICE — GLOVE BIOGEL PI ORTHO PRO 7.5

## (undated) DEVICE — RETRIEVER SUTURE HEWSON DISP

## (undated) DEVICE — GAUZE SPONGE 4X4 12PLY

## (undated) DEVICE — SET CASSETTE TUBE DW OUTFLOW

## (undated) DEVICE — CLOSURE SKIN STERI STRIP 1/2X4

## (undated) DEVICE — COVER LIGHT HANDLE 80/CA

## (undated) DEVICE — ELECTRODE BLD EXT 6.50 ST DISP

## (undated) DEVICE — SUT MONOCRYL PLUS UD 3-0 27

## (undated) DEVICE — DRAPE STERI U-SHAPED 47X51IN

## (undated) DEVICE — SPONGE DERMACEA 4X4IN 12PLY

## (undated) DEVICE — COVER CAMERA OPERATING ROOM

## (undated) DEVICE — EVACUATOR PENCIL SMOKE NEPTUNE

## (undated) DEVICE — PACK BASIC SETUP SC BR

## (undated) DEVICE — SEE MEDLINE ITEM 157117

## (undated) DEVICE — SEE MEDLINE ITEM 157131

## (undated) DEVICE — DRAPE STERI INSTRUMENT 1018

## (undated) DEVICE — SEE MEDLINE ITEM 152622

## (undated) DEVICE — GLOVE BIOGEL ORTHOPEDIC 7.5

## (undated) DEVICE — SUT ETHICON 3-0 BLK MONO PS

## (undated) DEVICE — HYDROGEN PEROXIDE HDX10 3% 4OZ

## (undated) DEVICE — KIT TURNOVER

## (undated) DEVICE — POSITIONER HEAD DONUT 9IN FOAM

## (undated) DEVICE — BLADE COOLCUT EXCALIBER 4X13

## (undated) DEVICE — SUT PROLENE 3-0 PS-2 BL 18IN

## (undated) DEVICE — BONE PIN

## (undated) DEVICE — SUT VICRYL 2-0 CT-2 VCP269H

## (undated) DEVICE — SUPPORT ULNA NERVE PROTECTOR

## (undated) DEVICE — DRAPE MOBILE C-ARM

## (undated) DEVICE — DRAPE INCISE IOBAN 2 23X17IN

## (undated) DEVICE — GLOVE BIOGEL PI ORTHO PRO 6.5

## (undated) DEVICE — COVER OVERHEAD SURG LT BLUE

## (undated) DEVICE — DRAPE THREE-QTR REINF 53X77IN

## (undated) DEVICE — TAPE SURGICAL MICROFOAM 4IN

## (undated) DEVICE — COVER PROXIMA MAYO STAND

## (undated) DEVICE — BLADE SAW SAG 6.30X12.70X.64

## (undated) DEVICE — DRAPE IOBAN 2 STERI

## (undated) DEVICE — TUBING SUCTION STRAIGHT .25X20

## (undated) DEVICE — SEE MEDLINE ITEM 152529

## (undated) DEVICE — MIXER BONE CEMENT

## (undated) DEVICE — DRAPE HIP TIBURON 87X115X134

## (undated) DEVICE — INTERPULSE SET

## (undated) DEVICE — UNDERGLOVES BIOGEL PI SIZE 8

## (undated) DEVICE — SEE MEDLINE ITEM 146292

## (undated) DEVICE — SOL 9P NACL IRR PIC IL

## (undated) DEVICE — BNDG COFLEX FOAM LF2 ST 6X5YD

## (undated) DEVICE — TAPE SILK 3IN

## (undated) DEVICE — SOL IRR NACL .9% 3000ML

## (undated) DEVICE — DRESSING WND HELIX ADH 5X6IN

## (undated) DEVICE — COVER TABLE HVY DTY 60X90IN

## (undated) DEVICE — CAUTERY TIP 2 3/4

## (undated) DEVICE — ALCOHOL 70% ISOP RUBBING 4OZ

## (undated) DEVICE — SUT VICRYL PLUS 2-0 CT1 18

## (undated) DEVICE — SUT VICRYL 1 OB 36 CTX

## (undated) DEVICE — BLADE SAG 18.0X1.27X100